# Patient Record
Sex: MALE | Race: WHITE | NOT HISPANIC OR LATINO | Employment: OTHER | URBAN - METROPOLITAN AREA
[De-identification: names, ages, dates, MRNs, and addresses within clinical notes are randomized per-mention and may not be internally consistent; named-entity substitution may affect disease eponyms.]

---

## 2022-08-01 ENCOUNTER — APPOINTMENT (OUTPATIENT)
Dept: SPEECH THERAPY | Facility: CLINIC | Age: 87
DRG: 065 | End: 2022-08-01
Attending: PHYSICIAN ASSISTANT
Payer: COMMERCIAL

## 2022-08-01 ENCOUNTER — APPOINTMENT (OUTPATIENT)
Dept: CT IMAGING | Facility: CLINIC | Age: 87
DRG: 065 | End: 2022-08-01
Attending: EMERGENCY MEDICINE
Payer: COMMERCIAL

## 2022-08-01 ENCOUNTER — APPOINTMENT (OUTPATIENT)
Dept: OCCUPATIONAL THERAPY | Facility: CLINIC | Age: 87
DRG: 065 | End: 2022-08-01
Attending: PHYSICIAN ASSISTANT
Payer: COMMERCIAL

## 2022-08-01 ENCOUNTER — APPOINTMENT (OUTPATIENT)
Dept: MRI IMAGING | Facility: CLINIC | Age: 87
DRG: 065 | End: 2022-08-01
Payer: COMMERCIAL

## 2022-08-01 ENCOUNTER — HOSPITAL ENCOUNTER (INPATIENT)
Facility: CLINIC | Age: 87
LOS: 29 days | Discharge: NURSING, REHAB OR LONG-TERM CARE | DRG: 065 | End: 2022-08-30
Attending: EMERGENCY MEDICINE | Admitting: HOSPITALIST
Payer: COMMERCIAL

## 2022-08-01 DIAGNOSIS — I63.9 CEREBROVASCULAR ACCIDENT (CVA), UNSPECIFIED MECHANISM (H): ICD-10-CM

## 2022-08-01 DIAGNOSIS — E11.9 TYPE 2 DIABETES, HBA1C GOAL < 8% (H): ICD-10-CM

## 2022-08-01 DIAGNOSIS — E10.9 TYPE 1 DIABETES, HBA1C GOAL < 8% (H): Primary | ICD-10-CM

## 2022-08-01 DIAGNOSIS — R47.01 APHASIA: ICD-10-CM

## 2022-08-01 LAB
ALBUMIN UR-MCNC: NEGATIVE MG/DL
ANION GAP SERPL CALCULATED.3IONS-SCNC: 4 MMOL/L (ref 3–14)
APPEARANCE UR: CLEAR
APTT PPP: 34 SECONDS (ref 22–38)
ATRIAL RATE - MUSE: 84 BPM
BASOPHILS # BLD AUTO: 0 10E3/UL (ref 0–0.2)
BASOPHILS NFR BLD AUTO: 0 %
BILIRUB UR QL STRIP: NEGATIVE
BUN SERPL-MCNC: 15 MG/DL (ref 7–30)
CALCIUM SERPL-MCNC: 7.5 MG/DL (ref 8.5–10.1)
CHLORIDE BLD-SCNC: 106 MMOL/L (ref 94–109)
CHOLEST SERPL-MCNC: 100 MG/DL
CO2 SERPL-SCNC: 24 MMOL/L (ref 20–32)
COLOR UR AUTO: ABNORMAL
CREAT SERPL-MCNC: 0.82 MG/DL (ref 0.66–1.25)
DIASTOLIC BLOOD PRESSURE - MUSE: NORMAL MMHG
EOSINOPHIL # BLD AUTO: 0.1 10E3/UL (ref 0–0.7)
EOSINOPHIL NFR BLD AUTO: 3 %
ERYTHROCYTE [DISTWIDTH] IN BLOOD BY AUTOMATED COUNT: 13.3 % (ref 10–15)
GFR SERPL CREATININE-BSD FRML MDRD: 84 ML/MIN/1.73M2
GLUCOSE BLD-MCNC: 293 MG/DL (ref 70–99)
GLUCOSE BLDC GLUCOMTR-MCNC: 184 MG/DL (ref 70–99)
GLUCOSE BLDC GLUCOMTR-MCNC: 200 MG/DL (ref 70–99)
GLUCOSE UR STRIP-MCNC: 500 MG/DL
HBA1C MFR BLD: 7.1 % (ref 0–5.6)
HBA1C MFR BLD: 7.3 % (ref 0–5.6)
HCT VFR BLD AUTO: 32.9 % (ref 40–53)
HDLC SERPL-MCNC: 38 MG/DL
HGB BLD-MCNC: 11 G/DL (ref 13.3–17.7)
HGB UR QL STRIP: NEGATIVE
HOLD SPECIMEN: NORMAL
IMM GRANULOCYTES # BLD: 0 10E3/UL
IMM GRANULOCYTES NFR BLD: 0 %
INR PPP: 1.37 (ref 0.85–1.15)
INTERPRETATION ECG - MUSE: NORMAL
KETONES UR STRIP-MCNC: NEGATIVE MG/DL
LDLC SERPL CALC-MCNC: 40 MG/DL
LEUKOCYTE ESTERASE UR QL STRIP: NEGATIVE
LYMPHOCYTES # BLD AUTO: 2 10E3/UL (ref 0.8–5.3)
LYMPHOCYTES NFR BLD AUTO: 36 %
MCH RBC QN AUTO: 30.7 PG (ref 26.5–33)
MCHC RBC AUTO-ENTMCNC: 33.4 G/DL (ref 31.5–36.5)
MCV RBC AUTO: 92 FL (ref 78–100)
MONOCYTES # BLD AUTO: 0.4 10E3/UL (ref 0–1.3)
MONOCYTES NFR BLD AUTO: 7 %
NEUTROPHILS # BLD AUTO: 3 10E3/UL (ref 1.6–8.3)
NEUTROPHILS NFR BLD AUTO: 54 %
NITRATE UR QL: NEGATIVE
NONHDLC SERPL-MCNC: 62 MG/DL
NRBC # BLD AUTO: 0 10E3/UL
NRBC BLD AUTO-RTO: 0 /100
P AXIS - MUSE: 34 DEGREES
PH UR STRIP: 6.5 [PH] (ref 5–7)
PLATELET # BLD AUTO: 147 10E3/UL (ref 150–450)
POTASSIUM BLD-SCNC: 4.2 MMOL/L (ref 3.4–5.3)
PR INTERVAL - MUSE: 196 MS
QRS DURATION - MUSE: 162 MS
QT - MUSE: 418 MS
QTC - MUSE: 493 MS
R AXIS - MUSE: -71 DEGREES
RBC # BLD AUTO: 3.58 10E6/UL (ref 4.4–5.9)
RBC URINE: 0 /HPF
SARS-COV-2 RNA RESP QL NAA+PROBE: NEGATIVE
SODIUM SERPL-SCNC: 134 MMOL/L (ref 133–144)
SP GR UR STRIP: 1.02 (ref 1–1.03)
SYSTOLIC BLOOD PRESSURE - MUSE: NORMAL MMHG
T AXIS - MUSE: 17 DEGREES
TRIGL SERPL-MCNC: 110 MG/DL
TROPONIN I SERPL HS-MCNC: 68 NG/L
TROPONIN I SERPL HS-MCNC: 78 NG/L
TROPONIN I SERPL HS-MCNC: 90 NG/L
UROBILINOGEN UR STRIP-MCNC: NORMAL MG/DL
VENTRICULAR RATE- MUSE: 84 BPM
WBC # BLD AUTO: 5.6 10E3/UL (ref 4–11)
WBC URINE: 0 /HPF

## 2022-08-01 PROCEDURE — 85730 THROMBOPLASTIN TIME PARTIAL: CPT | Performed by: EMERGENCY MEDICINE

## 2022-08-01 PROCEDURE — 255N000002 HC RX 255 OP 636: Performed by: EMERGENCY MEDICINE

## 2022-08-01 PROCEDURE — 93005 ELECTROCARDIOGRAM TRACING: CPT

## 2022-08-01 PROCEDURE — 85610 PROTHROMBIN TIME: CPT | Performed by: EMERGENCY MEDICINE

## 2022-08-01 PROCEDURE — 99223 1ST HOSP IP/OBS HIGH 75: CPT | Mod: AI | Performed by: PHYSICIAN ASSISTANT

## 2022-08-01 PROCEDURE — C9803 HOPD COVID-19 SPEC COLLECT: HCPCS

## 2022-08-01 PROCEDURE — 250N000012 HC RX MED GY IP 250 OP 636 PS 637: Performed by: PHYSICIAN ASSISTANT

## 2022-08-01 PROCEDURE — U0003 INFECTIOUS AGENT DETECTION BY NUCLEIC ACID (DNA OR RNA); SEVERE ACUTE RESPIRATORY SYNDROME CORONAVIRUS 2 (SARS-COV-2) (CORONAVIRUS DISEASE [COVID-19]), AMPLIFIED PROBE TECHNIQUE, MAKING USE OF HIGH THROUGHPUT TECHNOLOGIES AS DESCRIBED BY CMS-2020-01-R: HCPCS | Performed by: EMERGENCY MEDICINE

## 2022-08-01 PROCEDURE — 99285 EMERGENCY DEPT VISIT HI MDM: CPT | Mod: 25

## 2022-08-01 PROCEDURE — 97535 SELF CARE MNGMENT TRAINING: CPT | Mod: GO | Performed by: OCCUPATIONAL THERAPIST

## 2022-08-01 PROCEDURE — 81001 URINALYSIS AUTO W/SCOPE: CPT | Performed by: EMERGENCY MEDICINE

## 2022-08-01 PROCEDURE — 250N000011 HC RX IP 250 OP 636: Performed by: EMERGENCY MEDICINE

## 2022-08-01 PROCEDURE — 250N000009 HC RX 250: Performed by: EMERGENCY MEDICINE

## 2022-08-01 PROCEDURE — 83036 HEMOGLOBIN GLYCOSYLATED A1C: CPT | Performed by: PHYSICIAN ASSISTANT

## 2022-08-01 PROCEDURE — 70450 CT HEAD/BRAIN W/O DYE: CPT

## 2022-08-01 PROCEDURE — 83718 ASSAY OF LIPOPROTEIN: CPT | Performed by: PHYSICIAN ASSISTANT

## 2022-08-01 PROCEDURE — 0042T CT HEAD PERFUSION W CONTRAST: CPT

## 2022-08-01 PROCEDURE — 84484 ASSAY OF TROPONIN QUANT: CPT | Performed by: PHYSICIAN ASSISTANT

## 2022-08-01 PROCEDURE — 250N000013 HC RX MED GY IP 250 OP 250 PS 637: Performed by: PHYSICIAN ASSISTANT

## 2022-08-01 PROCEDURE — 70496 CT ANGIOGRAPHY HEAD: CPT

## 2022-08-01 PROCEDURE — 36415 COLL VENOUS BLD VENIPUNCTURE: CPT | Performed by: PHYSICIAN ASSISTANT

## 2022-08-01 PROCEDURE — 99291 CRITICAL CARE FIRST HOUR: CPT | Mod: GC | Performed by: STUDENT IN AN ORGANIZED HEALTH CARE EDUCATION/TRAINING PROGRAM

## 2022-08-01 PROCEDURE — A9585 GADOBUTROL INJECTION: HCPCS | Performed by: EMERGENCY MEDICINE

## 2022-08-01 PROCEDURE — 80048 BASIC METABOLIC PNL TOTAL CA: CPT | Performed by: EMERGENCY MEDICINE

## 2022-08-01 PROCEDURE — 92526 ORAL FUNCTION THERAPY: CPT | Mod: GN | Performed by: SPEECH-LANGUAGE PATHOLOGIST

## 2022-08-01 PROCEDURE — 85025 COMPLETE CBC W/AUTO DIFF WBC: CPT | Performed by: EMERGENCY MEDICINE

## 2022-08-01 PROCEDURE — 84484 ASSAY OF TROPONIN QUANT: CPT | Performed by: EMERGENCY MEDICINE

## 2022-08-01 PROCEDURE — 97165 OT EVAL LOW COMPLEX 30 MIN: CPT | Mod: GO | Performed by: OCCUPATIONAL THERAPIST

## 2022-08-01 PROCEDURE — 70498 CT ANGIOGRAPHY NECK: CPT

## 2022-08-01 PROCEDURE — 120N000001 HC R&B MED SURG/OB

## 2022-08-01 PROCEDURE — 97530 THERAPEUTIC ACTIVITIES: CPT | Mod: GO | Performed by: OCCUPATIONAL THERAPIST

## 2022-08-01 PROCEDURE — 36415 COLL VENOUS BLD VENIPUNCTURE: CPT | Performed by: EMERGENCY MEDICINE

## 2022-08-01 PROCEDURE — 70553 MRI BRAIN STEM W/O & W/DYE: CPT

## 2022-08-01 PROCEDURE — 92610 EVALUATE SWALLOWING FUNCTION: CPT | Mod: GN | Performed by: SPEECH-LANGUAGE PATHOLOGIST

## 2022-08-01 RX ORDER — METOPROLOL TARTRATE 1 MG/ML
25 INJECTION, SOLUTION INTRAVENOUS ONCE
Status: DISCONTINUED | OUTPATIENT
Start: 2022-08-01 | End: 2022-08-01

## 2022-08-01 RX ORDER — METFORMIN HCL 500 MG
500 TABLET, EXTENDED RELEASE 24 HR ORAL
COMMUNITY

## 2022-08-01 RX ORDER — TAMSULOSIN HYDROCHLORIDE 0.4 MG/1
0.8 CAPSULE ORAL EVERY EVENING
COMMUNITY

## 2022-08-01 RX ORDER — METOPROLOL TARTRATE 1 MG/ML
2.5 INJECTION, SOLUTION INTRAVENOUS EVERY 6 HOURS PRN
Status: DISCONTINUED | OUTPATIENT
Start: 2022-08-01 | End: 2022-08-30 | Stop reason: HOSPADM

## 2022-08-01 RX ORDER — ATORVASTATIN CALCIUM 40 MG/1
40 TABLET, FILM COATED ORAL EVERY EVENING
Status: DISCONTINUED | OUTPATIENT
Start: 2022-08-01 | End: 2022-08-02

## 2022-08-01 RX ORDER — NICOTINE POLACRILEX 4 MG
15-30 LOZENGE BUCCAL
Status: DISCONTINUED | OUTPATIENT
Start: 2022-08-01 | End: 2022-08-30 | Stop reason: HOSPADM

## 2022-08-01 RX ORDER — TAMSULOSIN HYDROCHLORIDE 0.4 MG/1
0.4 CAPSULE ORAL DAILY
Status: DISCONTINUED | OUTPATIENT
Start: 2022-08-02 | End: 2022-08-30 | Stop reason: HOSPADM

## 2022-08-01 RX ORDER — LISINOPRIL AND HYDROCHLOROTHIAZIDE 20; 25 MG/1; MG/1
1 TABLET ORAL DAILY
COMMUNITY
End: 2022-08-07

## 2022-08-01 RX ORDER — DEXTROSE MONOHYDRATE 25 G/50ML
25-50 INJECTION, SOLUTION INTRAVENOUS
Status: DISCONTINUED | OUTPATIENT
Start: 2022-08-01 | End: 2022-08-30 | Stop reason: HOSPADM

## 2022-08-01 RX ORDER — METOPROLOL SUCCINATE 50 MG/1
25 TABLET, EXTENDED RELEASE ORAL DAILY
COMMUNITY
End: 2024-03-07

## 2022-08-01 RX ORDER — IOPAMIDOL 755 MG/ML
125 INJECTION, SOLUTION INTRAVASCULAR ONCE
Status: COMPLETED | OUTPATIENT
Start: 2022-08-01 | End: 2022-08-01

## 2022-08-01 RX ORDER — LIDOCAINE 40 MG/G
CREAM TOPICAL
Status: DISCONTINUED | OUTPATIENT
Start: 2022-08-01 | End: 2022-08-30 | Stop reason: HOSPADM

## 2022-08-01 RX ORDER — ATORVASTATIN CALCIUM 20 MG/1
10 TABLET, FILM COATED ORAL AT BEDTIME
COMMUNITY
End: 2024-03-07

## 2022-08-01 RX ORDER — TAMSULOSIN HYDROCHLORIDE 0.4 MG/1
0.4 CAPSULE ORAL DAILY
Status: DISCONTINUED | OUTPATIENT
Start: 2022-08-01 | End: 2022-08-01

## 2022-08-01 RX ORDER — LORATADINE 10 MG/1
10 TABLET ORAL DAILY
COMMUNITY
End: 2024-03-07

## 2022-08-01 RX ORDER — GADOBUTROL 604.72 MG/ML
8 INJECTION INTRAVENOUS ONCE
Status: COMPLETED | OUTPATIENT
Start: 2022-08-01 | End: 2022-08-01

## 2022-08-01 RX ADMIN — GADOBUTROL 8 ML: 604.72 INJECTION INTRAVENOUS at 10:12

## 2022-08-01 RX ADMIN — ATORVASTATIN CALCIUM 40 MG: 40 TABLET, FILM COATED ORAL at 21:20

## 2022-08-01 RX ADMIN — INSULIN ASPART 1 UNITS: 100 INJECTION, SOLUTION INTRAVENOUS; SUBCUTANEOUS at 21:36

## 2022-08-01 RX ADMIN — SODIUM CHLORIDE 100 ML: 900 INJECTION INTRAVENOUS at 09:19

## 2022-08-01 RX ADMIN — IOPAMIDOL 125 ML: 755 INJECTION, SOLUTION INTRAVENOUS at 09:19

## 2022-08-01 ASSESSMENT — ACTIVITIES OF DAILY LIVING (ADL)
ADLS_ACUITY_SCORE: 37
ADLS_ACUITY_SCORE: 35
ADLS_ACUITY_SCORE: 37

## 2022-08-01 ASSESSMENT — ENCOUNTER SYMPTOMS
WEAKNESS: 0
NUMBNESS: 0
SPEECH DIFFICULTY: 1
HEADACHES: 0

## 2022-08-01 NOTE — ED PROVIDER NOTES
History   Chief Complaint:  Slurred Speech       The history is provided by the EMS personnel.      Beverley Macias is a 89 year old male with history of atrial fibrillation on eliquis who presents with stroke symptoms. Last night around 1130 p.m. Beverley went to bed feeling normal. This morning around 0430 Beverley woke with speech and gait abnormalities. EMS was called who found beverley with sugars around 301. Here at the emergency department Beverley denies any headache or numbness/tingling.  He is having difficulty with word finding and difficulty producing his words.    Review of Systems   Neurological: Positive for speech difficulty. Negative for weakness, numbness and headaches.   All other systems reviewed and are negative.    Allergies:  No known allergies    Medications:  Tessalon  Fluticasone   Atorvastatin  Loratadine  Metformin   Metoprolol   Tamsulosin HCl  Outpatient Triamcinolone Acetonide  Eliquis    Past Medical History:     Sprain and strain of knee and leg  TIA  HTN  DM2  CAD  Atrial fibrillation  BPH    Past Surgical History:    The patient does not have any pertinent past surgical history.     Social History:  Patient presents via EMS  PCP: No primary care provider on file.     Physical Exam     Patient Vitals for the past 24 hrs:   BP Temp Temp src Pulse Resp SpO2 Weight   08/01/22 1000 (!) 162/83 -- -- 78 15 95 % --   08/01/22 0945 (!) 151/92 97.6  F (36.4  C) Temporal 82 16 97 % --   08/01/22 0944 (!) 188/80 -- -- 82 13 96 % 84 kg (185 lb 3 oz)   08/01/22 0935 (!) 173/85 -- -- 85 11 -- --       Physical Exam  General: Alert, interactive in mild distress  Head:  Scalp is atraumatic  Eyes:  The pupils are equal, round, and reactive to light    EOM's intact    No scleral icterus  ENT:      Nose:  The external nose is normal  Ears:  External ears are normal  Mouth/Throat: Edentulous, dry mucous membranes      Neck:  Normal range of motion.      There is no rigidity.    Trachea is in the  midline         CV:  Regular rate and rhythm      Resp:  Breath sounds are clear bilaterally    Non-labored, no retractions or accessory muscle use      GI:  Abdomen is soft, no distension, no tenderness.       MS:  Normal strength in all 4 extremities  Skin:  Warm and dry, No rash or lesions noted.  Neuro: Strength 5/5 x4.  Sensation intact  In all 4 extremities.      [employer] Stroke Scale  Exam Interval: Baseline   Score    Level of consciousness: (0)   Alert, keenly responsive    LOC questions: (0)   Answers both questions correctly    LOC commands: (0)   Performs both tasks correctly    Best gaze: (0)   Normal    Visual: (0)   No visual loss    Facial palsy: (0)   Normal symmetrical movements    Motor arm (left): (0)   No drift    Motor arm (right): (0)   No drift    Motor leg (left): (0)   No drift    Motor leg (right): (0)   No drift    Limb ataxia: (0)   Absent    Sensory: (0)   Normal- no sensory loss    Best language: (1)   Mild to moderate aphasia    Dysarthria: (1)   Mild to moderate dysarthria    Extinction and inattention: (0)   No abnormality        Total Score:  2     Psych:  Awake. Alert.  Normal affect.      Appropriate interactions.    Emergency Department Course   ECG  ECG taken at 0946, ECG read at 0954  Sinus rhythm with frequent premature ventricular complexes. Right bundle branch block. Left anterior fascicular block. Bifascicular block. Abnormal ECG.    Agree with computer interpretation.  Rate 84 bpm. KS interval 196 ms. QRS duration 162 ms. QT/QTc 418/493 ms. P-R-T axes 34 -71 17.     Imaging:  MR Brain w/o & w Contrast   Final Result   IMPRESSION:   1. Acute right cerebellar infarct.   2. 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   3. Chronic changes as detailed.      MORRO CORREA MD            SYSTEM ID:  [id number]      CT Head Perfusion w Contrast   Final Result   IMPRESSION: Mild transit time prolongation is present corresponding to   the left anterior cerebral artery  distribution with compensated   cerebral blood volumes. Otherwise, no focal perfusion defects are   identified.      MORRO ALEJANDRO MD            SYSTEM ID:  [id number]      CTA Head Neck w Contrast   Preliminary Result   IMPRESSION:    CTA Head:    1. 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    2. No other large vessel occlusions or high-grade stenoses are   identified.   CTA Neck:    No evidence of large vessel occlusion or high-grade stenosis.       CT Head w/o Contrast   Final Result   IMPRESSION:     1. No CT evidence of acute ischemia or hemorrhage (ASPECTS 10).   2. Small old infarct involving the left prefrontal cortex in the   anterior cerebral artery distribution.      Findings were discussed by phone between Dr. Alejandro and Dr. Vasquez   at 9:36 AM on 8/1/2022.      MORRO ALEJANDRO MD            SYSTEM ID:  [id number]        Report per radiology    Laboratory:  Labs Ordered and Resulted from Time of ED Arrival to Time of ED Departure   BASIC METABOLIC PANEL - Abnormal       Result Value    Sodium 134      Potassium 4.2      Chloride 106      Carbon Dioxide (CO2) 24      Anion Gap 4      Urea Nitrogen 15      Creatinine 0.82      Calcium 7.5 (*)     Glucose 293 (*)     GFR Estimate 84     INR - Abnormal    INR 1.37 (*)    CBC WITH PLATELETS AND DIFFERENTIAL - Abnormal    WBC Count 5.6      RBC Count 3.58 (*)     Hemoglobin 11.0 (*)     Hematocrit 32.9 (*)     MCV 92      MCH 30.7      MCHC 33.4      RDW 13.3      Platelet Count 147 (*)     % Neutrophils 54      % Lymphocytes 36      % Monocytes 7      % Eosinophils 3      % Basophils 0      % Immature Granulocytes 0      NRBCs per 100 WBC 0      Absolute Neutrophils 3.0      Absolute Lymphocytes 2.0      Absolute Monocytes 0.4      Absolute Eosinophils 0.1      Absolute Basophils 0.0      Absolute Immature Granulocytes 0.0      Absolute NRBCs 0.0     ROUTINE UA WITH MICROSCOPIC REFLEX TO CULTURE - Abnormal    Color Urine Light Yellow      Appearance  Urine Clear      Glucose Urine 500  (*)     Bilirubin Urine Negative      Ketones Urine Negative      Specific Gravity Urine 1.023      Blood Urine Negative      pH Urine 6.5      Protein Albumin Urine Negative      Urobilinogen Urine Normal      Nitrite Urine Negative      Leukocyte Esterase Urine Negative      RBC Urine 0      WBC Urine 0     PARTIAL THROMBOPLASTIN TIME - Normal    aPTT 34     TROPONIN I - Normal    Troponin I High Sensitivity 68     COVID-19 VIRUS (CORONAVIRUS) BY PCR - Normal    SARS CoV2 PCR Negative     GLUCOSE MONITOR NURSING POCT      Emergency Department Course:       Reviewed:  I reviewed nursing notes, vitals and past medical history    Assessments/Consults:  ED Course as of 08/01/22 1118   Mon Aug 01, 2022   0914 Obtained history and examined the patient as noted above.    0916 Code stroke tier 2 called.   0922 Consult with stroke neurology   0936 Consult with Dr. Alejandro from Radiology.   1108 Consult with Dr. Rouse from Stroke Neurology.   1116 Consulted with the Hospitalist about the patient's history and presentation in the emergency department. Hospitalist agreed to accept the patient for admission.         Disposition:  The patient was admitted to the hospital under the care of Dr. Garcia.     Impression & Plan   Medical Decision Making:  Following presentation history and physical examination were performed, tier 2 code stroke was activated.  He is not a candidate for tenecteplase given his use of Eliquis, he is not a candidate for thrombectomy given the lack of a large vessel occlusion.  Hyperacute MRI demonstrates a small cerebellar infarct which could be causing his balance issues.  The case was discussed on multiple episodes with the stroke neurologist team, they will continue to see the patient in consultation.  They have advised against antiplatelets at present but will consider for inpatient.  Given the ongoing symptoms he will be admitted to the hospital for further  evaluation and treatment.  There is no signs of an acute infectious etiology or more concerning illness.    Critical care time exclusive of procedures is 30 minutes during this time there is bedside evaluation, family consultation, EMS consultation, and neurology and hospitalist consultation.    Diagnosis:    ICD-10-CM    1. Cerebrovascular accident (CVA), unspecified mechanism (H)  I63.9    2. Aphasia  R47.01        Scribe Disclosure:  Bryan BALTAZAR, am serving as a scribe at 9:18 AM on 8/1/2022 to document services personally performed by Mc Vasquez MD based on my observations and the provider's statements to me.           Mc Vasquez MD  08/01/22 1200       Mc Vasquez MD  08/01/22 1200

## 2022-08-01 NOTE — CONSULTS
[facility]    Stroke Consult Note    Reason for Consult: Stroke Code     Chief Complaint: Slurred Speech      HPI  Nathan Macias is a 89 year old male on Eliquis for Afib presents with severe dysarthria and aphasia. He notes he is having difficulty with his speech. He was LNK at 1130 last night and woke up at 430am with aphasia. He reports taking his Eliquis last night.     Imaging Findings                                                                   IMPRESSION:    1. No CT evidence of acute ischemia or hemorrhage (ASPECTS 10).  2. Small old infarct involving the left prefrontal cortex in the  anterior cerebral artery distribution    CTA Head:   1. 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   2. No other large vessel occlusions or high-grade stenoses are  identified.  CTA Neck:   No evidence of large vessel occlusion or high-grade stenosis.    This result has not been signed. Information might be incomplete.     IMPRESSION: Mild transit time prolongation is present corresponding to  the left anterior cerebral artery distribution with compensated  cerebral blood volumes. Otherwise, no focal perfusion defects are  identified.      MRI brain WWO: Showed DWI changes in the R cerebellum no acute stroke in the left OMKAR territory.   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     Impression   Acute ischemic stroke of R cerebllum due to cardioembolism vs small vessel disease- had expressive aphasia, waxes and wanes on presentation here is currently on Eliquis for Afib. Given anticoagulation, would consider  "other possible etiologies such as seizures for presentation. MRI showed  Acute stroke in the R cerebellum that is consistent with dysarthria, right arm and leg ataxia seen on exam, however inconsistent with aphasia. 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 Given Eliquis use may be an eliquis failure, has a smoking history so consideration for malignancy as a possible etiology.     Recommendations  - Use orderset: \"Ischemic Stroke Routine Admission\" or \"Ischemic Stroke No Thrombolytics/No Thrombectomy ICU Admission\"  - Neurochecks and Vital Signs every Q4H   - Permissive HTN; goal SBP < 220 mmHg  - Statin: Lipitor 40  - TTE (with Bubble Study if age 60 yrs or less)  - Telemetry, EKG  - Bedside Glucose Monitoring  - A1c, Lipid Panel, Troponin x 3  - PT/OT/SLP  - Stroke Education  - Euthermia, Euglycemia  -Resume Eliquis   -Has a smoking history would consider malignancy work-up given risk factor and stroke through Eliquis.     Patient Follow-up    - final recommendation pending work-up        Thank you for this consult. We will continue to follow.     The Stroke Staff is Dr. Guillory.    Janeth Walton MD  Vascular Neurology Fellow  To page me or covering stroke neurology team member, click here: [facility]   Choose \"On Call\" tab at top, then search dropdown box for \"Neurology Adult\", select location, press Enter, then look for stroke/neuro ICU/telestroke.    ______________________________________________________    Clinically Significant Risk Factors Present on Admission     Past Medical History   Past Medical History:   Diagnosis Date     A-fib (H)      BPH (benign prostatic hyperplasia)      CAD (coronary artery disease)      DMII (diabetes mellitus, type 2) (H)      TIA (transient ischemic attack)      Past Surgical History   Past Surgical History:   Procedure Laterality Date     BACK SURGERY      1990s     TONGUE SURGERY      due to precancerous lesion "     Medications   Home Meds  Prior to Admission medications    Medication Sig Start Date End Date Taking? Authorizing Provider   ACTOS 45 MG OR TABS 1 TABLET DAILY    Reported, Patient   GLIPIZIDE OR one tablet daily    Reported, Patient   LEVAQUIN 500 MG OR TABS ONE DAILY 08   Jayleen Lorenz PA-C   RANITIDINE  MG OR TABS 1 TABLET once  DAILY    Reported, Patient   ROCEPHIN 1 GM IJ SOLR 1 gram IM in clinic 08   Jayleen Lorenz PA-C       Scheduled Meds      Infusion Meds      PRN Meds      Allergies   No Known Allergies  Family History   No family history on file.  Social History   Social History     Tobacco Use     Smoking status: Former Smoker     Years: 25.00     Types: Cigars, Pipe     Quit date:      Years since quittin.   Substance Use Topics     Alcohol use: Never       Review of Systems   The 5 point Review of Systems is negative other than noted in the HPI or here.        PHYSICAL EXAMINATION  Temp:  [97.4  F (36.3  C)-97.6  F (36.4  C)] 97.4  F (36.3  C)  Pulse:  [44-85] 68  Resp:  [11-25] 16  BP: (141-188)/() 173/87  SpO2:  [95 %-98 %] 97 %     Neurologic  Mental Status:  alert, oriented x 3, follows commands, naming and repetition normal, severe dysarthria, mild expressive aphasia  Cranial Nerves:  visual fields intact, EOMI with normal smooth pursuit, facial sensation intact and symmetric, facial movements symmetric, hearing not formally tested but intact to conversation, dysarthria  Motor:  normal muscle tone and bulk, no abnormal movements, able to move all limbs spontaneously, strength 5/5 throughout upper and lower extremities, no pronator drift  Reflexes:  toes down-going  Sensory:  light touch sensation intact and symmetric throughout upper and lower extremities, no extinction on double simultaneous stimulation   Coordination:  ataxia in RUE, RLE with heel to shin and finger to nose  Station/Gait:  deferred    Dysphagia Screen  Per  Nursing    Stroke Scales    NIHSS  1a. Level of Consciousness 0-->Alert, keenly responsive   1b. LOC Questions 0-->Answers both questions correctly   1c. LOC Commands 0-->Performs both tasks correctly   2.   Best Gaze 0-->Normal   3.   Visual 0-->No visual loss   4.   Facial Palsy 0-->Normal symmetrical movements   5a. Motor Arm, Left 0-->No drift, limb holds 90 (or 45) degrees for full 10 secs   5b. Motor Arm, Right 0-->No drift, limb holds 90 (or 45) degrees for full 10 secs   6a. Motor Leg, Left 0-->No drift, leg holds 30 degree position for full 5 secs   6b. Motor Leg, right 0-->No drift, leg holds 30 degree position for full 5 secs   7.   Limb Ataxia 2-->Present in two limbs   8.   Sensory 0-->Normal, no sensory loss   9.   Best Language 1-->Mild-to-moderate aphasia, some obvious loss of fluency or facility of comprehension, without significant limitation on ideas expressed or form of expression. Reduction of speech and/or comprehension, however, makes conversation. . . (see row details)   10. Dysarthria 2-->Severe dysarthria, patients speech is so slurred as to be unintelligible in the absence of or out of proportion to any dysphasia, or is mute/anarthric   11. Extinction and Inattention  0-->No abnormality   Total 5 (08/01/22 0937)       Modified Holcomb Score (Pre-morbid)  1 - No significant disability.  Able to carry out all usual activities, despite some symptoms.    Imaging  I personally reviewed all imaging; relevant findings per HPI.     Lab Results Data   CBC  Recent Labs   Lab 08/01/22  0939   WBC 5.6   RBC 3.58*   HGB 11.0*   HCT 32.9*   *     Basic Metabolic Panel    Recent Labs   Lab 08/01/22  1308 08/01/22  0939   NA  --  134   POTASSIUM  --  4.2   CHLORIDE  --  106   CO2  --  24   BUN  --  15   CR  --  0.82   * 293*   MAURICIO  --  7.5*     Liver Panel  No results for input(s): PROTTOTAL, ALBUMIN, BILITOTAL, ALKPHOS, AST, ALT, BILIDIRECT in the last 168 hours.  INR    Recent Labs   Lab  Test 08/01/22  0939   INR 1.37*      Lipid Profile  No lab results found.  A1C  No lab results found.  Troponin I    Recent Labs   Lab 08/01/22  0939   TROPONINIS 68          Stroke Code Data Data   Stroke Code Data  (for stroke code without tele)  Stroke code activated 08/01/22   0919   First stroke provider response 08/01/22   0937   Last known normal 07/31/22   2330   Time of discovery   (or onset of symptoms) 08/01/22   0430   Head CT read by Stroke Neuro Dr/Provider 08/01/22   0930   Was stroke code de-escalated? Yes 08/01/22 1106

## 2022-08-01 NOTE — PROGRESS NOTES
08/01/22 1454   General Information   Onset of Illness/Injury or Date of Surgery 08/01/22   Referring Physician Dr. Garcia   Patient/Family Therapy Goal Statement (SLP) Patient wants to eat.   Pertinent History of Current Problem Acute ischemic stroke of R cerebllum due to cardioembolism vs small vessel disease- had expressive aphasia, waxes and wanes on presentation here is currently on Eliquis for Afib. Given anticoagulation, would consider other possible etiologies such as seizures for presentation. MRI showed  Acute stroke in the R cerebellum that is consistent with dysarthria, right arm and leg ataxia seen on exam, however inconsistent with aphasia. 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   General Observations Patient reports having a cold with cough.   Past History of Dysphagia No documented history found.   Type of Evaluation   Type of Evaluation Swallow Evaluation   Oral Motor   Oral Musculature generally intact   Structural Abnormalities none present   Mucosal Quality adequate   Dentition (Oral Motor)   Comment, Dentition (Oral Motor) Dentures not present at time of the evaluation.   Dentition (Oral Motor) edentulous;dental appliance/dentures   Dental Appliance/Denture (Oral Motor) upper and lower   Facial Symmetry (Oral Motor)   Facial Symmetry (Oral Motor) WNL   Lip Function (Oral Motor)   Lip Range of Motion (Oral Motor) WNL   Tongue Function (Oral Motor)   Tongue Coordination/Speed (Oral Motor) slows down progressively   Tongue ROM (Oral Motor) elevation is impaired;lateralization is impaired   Lateralization, Tongue ROM Impairment (Oral Motor) bilateral  (Mild incoordination.)   Elevation, Tongue ROM Impairment (Oral Motor) bilateral;minimal impairment   Jaw Function (Oral Motor)   Jaw Function (Oral Motor) WNL   Cough/Swallow/Gag Reflex (Oral Motor)   Soft Palate/Velum (Oral Motor) elevation decreased bilaterally   Volitional Throat  Clear/Cough (Oral Motor) impaired;reduced strength   Volitional Swallow (Oral Motor) mildly delayed   Vocal Quality/Secretion Management (Oral Motor)   Vocal Quality (Oral Motor) WFL   General Swallowing Observations   Respiratory Support (General Swallowing Observations) none   Current Diet/Method of Nutritional Intake (General Swallowing Observations, NIS) NPO   Swallowing Evaluation Clinical swallow evaluation   Clinical Swallow Evaluation   Feeding Assistance set up only required   Clinical Swallow Evaluation Textures Trialed thin liquids;pureed   Clinical Swallow Eval: Thin Liquid Texture Trial   Mode of Presentation, Thin Liquids cup;self-fed   Volume of Liquid or Food Presented 6 oz of water   Oral Phase of Swallow premature pharyngeal entry   Pharyngeal Phase of Swallow reduction in laryngeal movement   Diagnostic Statement No immediate or delaayed Sx of aspiration across 6 oz but can not rule out sieltn aspiration.   Clinical Swallow Evaluation: Puree Solid Texture Trial   Mode of Presentation, Puree spoon;self-fed   Volume of Puree Presented 4 oz of apple sauce   Oral Phase, Puree residue in oral cavity   Oral Residue, Puree left anterior lateral sulci   Pharyngeal Phase, Puree impaired;repeated swallows   Diagnostic Statement Oral residue on the left side that was cleared with a liquid rinse.   Swallowing Recommendations   Diet Consistency Recommendations pureed (level 4);thin liquids (level 0)   Supervision Level for Intake close supervision needed   Mode of Delivery Recommendations bolus size, small;food moistened;no straws;slow rate of intake   Postural Recommendations none   Swallowing Maneuver Recommendations alternate food and liquid intake   Monitoring/Assistance Required (Eating/Swallowing) check mouth frequently for oral residue/pocketing;stop eating activities when fatigue is present;monitor for cough or change in vocal quality with intake   Recommended Feeding/Eating Techniques (Swallow Eval)  maintain upright sitting position for eating;maintain upright posture during/after eating for 30 minutes;set-up and prepare tray   Medication Administration Recommendations, Swallowing (SLP) Crush and place in puree   Instrumental Assessment Recommendations VFSS (videofluoroscopic swallowing study)   Comment, Swallowing Recommendations pending tolerance VFSS maybe indicated.   General Therapy Interventions   Planned Therapy Interventions Dysphagia Treatment   Dysphagia treatment Modified diet education;Instruction of safe swallow strategies   Clinical Impression   Criteria for Skilled Therapeutic Interventions Met (SLP Eval) Yes, treatment indicated   SLP Diagnosis Mild to moderate oral and pharyngeal dysphagia   Risks & Benefits of therapy have been explained evaluation/treatment results reviewed;care plan/treatment goals reviewed;risks/benefits reviewed;current/potential barriers reviewed;participants voiced agreement with care plan;participants included;patient   Clinical Impression Comments Patient presents with mild to moderate oral and pharyngeal dysphagia at bedside. deficits inlcude; premature entry of thin liquids without immediate or delayed Sx of aspiration, but can not rule out silent aspiration. He demonstrated mildly decreased bolus control during AP movement with loss of the bolus into the left lateral sulci. Oral residue was cleared with an additional swallow or liquid rinse.     Recommend: 1. IDDSI level 4 puree and thin liquids. 2. Upright, no straws, small bites/sips, check for oral residue and alternate liquids/solids. Crush medications d/t rported difficulty swallowing pills. If coughing increases with Po intake hold diet.     SLP Discharge Planning   SLP Discharge Recommendation Acute Rehab Center-Motivated patient will benefit from intensive, interdisciplinary therapy.  Anticipate will be able to tolerate 3 hours of therapy per day;Transitional Care Facility   SLP Rationale for DC Rec Pending  further work up. Swallow and communication are below his baseline.   SLP Brief overview of current status  Mild to moderate dysphagia.    Total Evaluation Time   Total Evaluation Time (Minutes) 15

## 2022-08-01 NOTE — ED TRIAGE NOTES
Pt woke up at 0430 today, states was having difficulty sleeping last night. Pt doesn't normally wake at that time. Pt states his speech was slurred and walking was difficult. Pts wife woke up around 0730. Pt is on eliquis and DM. Hx of A-fib

## 2022-08-01 NOTE — H&P
[facility]    History and Physical  Hospitalist       Date of Admission:  8/1/2022    Assessment & Plan   Nathan Macias is a 89 year old male with a history of afib on Eliquis who was brought to the ED with dysarthria, aphasia and ataxia and noted to have an acute ischemic stroke of the RIGHT cerebellum. He is admitted for further evaluation and management.     1. Acute ischemic stroke of RIGHT cerebellum  - due to cardioembolism vs small vessel disease vs other  - expressive aphasia  - stroke team saw in ED  - neurology consult  - on Eliquis at home - could resume Eliquis or start another anticoagulant per neurology recs  - neurochecks q4H   - Lipitor 40mg/day per stroke team recs (was on 20mg at home)  - TTE  - lipid panel  - troponin 68 in ED - will repeat for total x3      2. Atrial fibrillation  - on metoprolol, and Eliquis at home  - initial ECG in ED noted sinus rhythm with frequent PVCs, RBBB, L anterior fascicular block, bifascicular block; HR 84 bpm  - HR significantly lower during exam - will repeat ECG  - continuous telemetry  - will hold metoprolol scheduled with lower HR  - metoprolol 2.5mg IV q6hrs PRN    3.  DMII  - hold metformin  - sliding scale insulin  - A1c pending    4. Hypertension  - hold home anti-hypertensives  -permissive hypertension per stroke team; goal SBP <220mmHg    Summary:  Mr. Macias will be admitted to our neuro floor for further evaluation and management of his acute ischemic stroke, including TTE, close neuro and cardiac monitoring, neurology consult and management of his anticoagulation.     DVT Prophylaxis: Pneumatic Compression Devices  Code Status: Full Code    Disposition: Expected discharge as clinical course indicates.     Aye Ridley PA-C,  Pager: [phone]    Primary Care Physician   [facility]    Chief Complaint   Difficulty with balance and speaking    History is obtained from the patient and patient's  spouse    History of Present Illness   Nathan Macias is a 89 year old male on Eliquis for afib who presented to the ED after initial noting balance issues and then difficulty with his speak. At about 4:30 am this morning, Mr. Macias got up to use the bathroom and was having difficulty walking, and needed to hold onto his wife to steady him to get back to bed. Starting at that time, wife states he was getting up to urinate about every 10 minutes, which is increased for him, and she was steadying him to the bathroom. She also noted that he was having difficulty with his speech/was hard to understand. They eventually presented to the ED. Stroke protocol was followed and Mr. Macias was noted to have an acute ischemic event. Thrombolytics were not given due to time from onset of symptoms and patient had taken Eliquis within the past 24 hours.     When bruises noted on patient's back, endorsed fall 2-3 weeks ago and about three falls total in the past three months. Wife states feels 's balance has not been as good the last three months. Denies ever hitting head with falls. He did not present for evaluation after any of these falls.     Past Medical History    I have reviewed this patient's medical history and updated it with pertinent information if needed.     Past Surgical History   I have reviewed this patient's surgical history and updated it with pertinent information if needed.    Prior to Admission Medications   Prior to Admission Medications   Prescriptions Last Dose Informant Patient Reported? Taking?   apixaban ANTICOAGULANT (ELIQUIS) 5 MG tablet 7/31/2022 at am  Yes Yes   Sig: Take 5 mg by mouth 2 times daily   atorvastatin (LIPITOR) 20 MG tablet 7/31/2022 at am  Yes Yes   Sig: Take 10 mg by mouth daily   lisinopril-hydrochlorothiazide (ZESTORETIC) 20-25 MG tablet 7/31/2022 at am  Yes Yes   Sig: Take 1 tablet by mouth daily   loratadine (CLARITIN) 10 MG tablet 7/31/2022 at am  Yes Yes   Sig:  Take 10 mg by mouth daily   metFORMIN (GLUCOPHAGE XR) 500 MG 24 hr tablet 7/31/2022 at am  Yes Yes   Sig: Take 500 mg by mouth daily (with breakfast)   metoprolol succinate ER (TOPROL XL) 50 MG 24 hr tablet 7/31/2022 at am  Yes Yes   Sig: Take 25 mg by mouth daily   tamsulosin (FLOMAX) 0.4 MG capsule 7/31/2022 at am  Yes Yes   Sig: Take 0.4 mg by mouth daily      Facility-Administered Medications: None     Allergies   No Known Allergies    Social History   I have reviewed this patient's social history and updated it with pertinent information if needed. Wife - Cherry. Son - Dayne.    Family History   Family history reviewed with patient and is noncontributory.    Review of Systems   The 10 point Review of Systems is negative other than noted in the HPI or here.     Physical Exam   Temp: 97.6  F (36.4  C) Temp src: Temporal BP: (!) 162/83 Pulse: 78   Resp: 15 SpO2: 95 %      Vital Signs with Ranges  Temp:  [97.6  F (36.4  C)] 97.6  F (36.4  C)  Pulse:  [78-85] 78  Resp:  [11-16] 15  BP: (151-188)/(80-92) 162/83  SpO2:  [95 %-97 %] 95 %  185 lbs 2.98 oz    Constitutional: A&O x4;, alert, cooperative, no apparent distress.  Eyes: Conjunctiva and pupils examined and normal.  HEENT: Moist mucous membranes, edentulous (patient states typically wears dentures); no irritation or lesions noted  Respiratory: Clear to auscultation bilaterally, no crackles or wheezing.  Cardiovascular: Regular rate and rhythm, normal S1 and S2, and no murmur noted.  GI: Soft, non-distended, non-tender, normal bowel sounds.  Lymph/Hematologic: No anterior cervical or supraclavicular adenopathy.  Skin: No rashes, no cyanosis, no edema.  Musculoskeletal: No joint swelling, erythema or tenderness.  Neurologic: dyarthria and expressive aphasia, Cranial nerves II-XII grossly intact, grossly normal strength and sensation equal bilateral upper and lower extremities.  Skin:Two linear mature bruises on upper back and right upper back - per patient from  fall a few weeks ago.   Psychiatric: Alert, oriented to person, place, time and situation, no obvious anxiety or depression.    Data   Data reviewed today:  I personally reviewed the EKG tracing showing  sinus rhythm with frequent PVCs, RBBB, L anterior fascicular block, bifascicular block; HR 84 bpm and the brain MRI image(s) showing acute right cerebellar infarct with a small old left prefrontal infarct.  Recent Labs   Lab 08/01/22  0939   WBC 5.6   HGB 11.0*   MCV 92   *   INR 1.37*      POTASSIUM 4.2   CHLORIDE 106   CO2 24   BUN 15   CR 0.82   ANIONGAP 4   MAURICIO 7.5*   *       Imaging:  Recent Results (from the past 24 hour(s))   CT Head w/o Contrast    Narrative    CT SCAN OF THE HEAD WITHOUT CONTRAST   8/1/2022 9:23 AM     HISTORY: Code stroke., Aphasia    TECHNIQUE:  Axial images of the head and coronal reformations without  IV contrast material. Radiation dose for this scan was reduced using  automated exposure control, adjustment of the mA and/or kV according  to patient size, or iterative reconstruction technique.    COMPARISON: Head CT 3/22/2004    FINDINGS:  Mild volume loss is present. White matter hypoattenuation  likely represents mild to moderate chronic small vessel ischemic  change. Parenchyma is otherwise unremarkable. Small old infarct  involving the left prefrontal cortex in the anterior cerebral artery  distribution, questionably present on the prior exam. No evidence of  acute ischemia, hemorrhage, mass, mass effect or hydrocephalus. The  visualized calvarium, tympanic cavities, mastoid cavities, and  extracranial soft tissues are unremarkable. Left maxillary sinus  retention cyst. Bilateral lens replacements.      Impression    IMPRESSION:    1. No CT evidence of acute ischemia or hemorrhage (ASPECTS 10).  2. Small old infarct involving the left prefrontal cortex in the  anterior cerebral artery distribution.    Findings were discussed by phone between Dr. Alejandro and   Trigger  at 9:36 AM on 8/1/2022.    MORRO CORREA MD         SYSTEM ID:  [id number]   CTA Head Neck w Contrast    Narrative    CT ANGIOGRAM OF THE HEAD AND NECK WITH CONTRAST  8/1/2022 9:32 AM     HISTORY: Code stroke, aphasia.    TECHNIQUE:  CT angiography with an injection of 75mL Isovue-370 IV  with scans through the head and neck. Images were transferred to a  separate 3-D workstation where multiplanar reformations and 3-D images  were created. Estimates of carotid stenoses are made relative to the  distal internal carotid artery diameters except as noted. Radiation  dose for this scan was reduced using automated exposure control,  adjustment of the mA and/or kV according to patient size, or iterative  reconstruction technique.    COMPARISON: None.     CT ANGIOGRAM HEAD FINDINGS:    The left anterior cerebral artery is occluded at the origin. No other  large vessel occlusions or high-grade stenoses are identified The  internal carotid arteries, right anterior cerebral artery, middle  cerebral arteries, vertebral arteries, basilar artery, and posterior  cerebral arteries are patent. Fetal origin of the right posterior  cerebral artery. Scattered atherosclerotic plaquing with multiple mild  stenoses. No aneurysm or vascular malformation is identified. Dural  venous sinuses are unremarkable.     CT ANGIOGRAM NECK FINDINGS:   The bilateral common carotid, internal carotid, external carotid, and  vertebral arteries are patent. No evidence of large vessel occlusion  or high-grade stenosis. Scattered atherosclerotic plaquing. No  evidence of dissection.     Cervical spine degenerative changes with partially calcified pannus  formation and erosion of the dens. Left maxillary sinus retention  cyst.       Impression    IMPRESSION:   CTA Head:   1. 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   2. No other large vessel occlusions or high-grade stenoses are  identified.  CTA Neck:   No evidence of large vessel occlusion  or high-grade stenosis.    CT Head Perfusion w Contrast    Narrative    CT BRAIN PERFUSION  8/1/2022 9:33 AM    HISTORY: Code stroke.    TECHNIQUE: Time sequential axial CT images of the head were acquired  during the administration of 125 mL Isovue-370 total IV. Color  perfusion maps of the brain were created from this time sequential  axial source data.     Radiation dose for this scan was reduced using automated exposure  control, adjustment of the mA and/or kV according to patient size, or  iterative reconstruction technique.    COMPARISON: None.      Impression    IMPRESSION: Mild transit time prolongation is present corresponding to  the left anterior cerebral artery distribution with compensated  cerebral blood volumes. Otherwise, no focal perfusion defects are  identified.    MORRO CORREA MD         SYSTEM ID:  [id number]   MR Brain w/o & w Contrast    Narrative    MRI BRAIN WITHOUT AND WITH CONTRAST  8/1/2022 10:36 AM    HISTORY:  Hyperacute rule out stroke.     TECHNIQUE:  Multiplanar, multisequence MRI of the brain without and  with 8 mL Gadavist.    COMPARISON: Same day head CT    FINDINGS:  Small area of diffusion restriction within the right  cerebellar hemisphere. No other areas of diffusion restriction. No  convincing evidence of hemorrhage. Mild parenchymal volume loss is  present with white matter T2 hyperintensities which likely represent  chronic small vessel ischemic change. Probable old basal  ganglia/periventricular lacunar infarcts. Small old infarct involving  the left prefrontal cortex.    Marrow signal is within normal limits. Left maxillary sinus retention  cyst. Trace right mastoid cavity opacification. Bilateral lens  replacements.      Impression    IMPRESSION:  1. Acute right cerebellar infarct.  2. Small old left prefrontal infarct.  3. Chronic changes as detailed.

## 2022-08-01 NOTE — CARE PLAN
RECEIVING UNIT ED HANDOFF REVIEW    ED Nurse Handoff Report was reviewed by: David Fisher RN on August 1, 2022 at 1:49 PM

## 2022-08-01 NOTE — CARE PLAN
Shift Summary    Neuro: A&O forgetful. WFD and slurred speech.   Cardiac: Afib CVR with PVCs  Pulmonary: Congest cough  GI/: Purewick. Pureed diet. SLP recommending crushing meds.  Skin: scattered bruising  Activity: A2  Pain: denies

## 2022-08-01 NOTE — PHARMACY-ADMISSION MEDICATION HISTORY
Pharmacy Medication History  Admission medication history interview status for the 8/1/2022  admission is complete. See EPIC admission navigator for prior to admission medications     Location of Interview: Patient room  Medication history sources: Patient and Patient's family/friend (spouse) and bag of medications wife brought in.    Significant changes made to the medication list:  Added all meds to list[facility]    In the past week, patient estimated taking medication this percent of the time: greater than 90%    Additional medication history information:   Missed doses last PM    Medication reconciliation completed by provider prior to medication history? No    Time spent in this activity: 10 minutes    Prior to Admission medications    Medication Sig Last Dose Taking? Auth Provider Long Term End Date   apixaban ANTICOAGULANT (ELIQUIS) 5 MG tablet Take 5 mg by mouth 2 times daily 7/31/2022 at am Yes Unknown, Entered By History     atorvastatin (LIPITOR) 20 MG tablet Take 10 mg by mouth daily 7/31/2022 at am Yes Unknown, Entered By History Yes    lisinopril-hydrochlorothiazide (ZESTORETIC) 20-25 MG tablet Take 1 tablet by mouth daily 7/31/2022 at am Yes Unknown, Entered By History Yes    loratadine (CLARITIN) 10 MG tablet Take 10 mg by mouth daily 7/31/2022 at am Yes Unknown, Entered By History     metFORMIN (GLUCOPHAGE XR) 500 MG 24 hr tablet Take 500 mg by mouth daily (with breakfast) 7/31/2022 at am Yes Unknown, Entered By History Yes    metoprolol succinate ER (TOPROL XL) 50 MG 24 hr tablet Take 25 mg by mouth daily 7/31/2022 at am Yes Unknown, Entered By History Yes    tamsulosin (FLOMAX) 0.4 MG capsule Take 0.4 mg by mouth daily 7/31/2022 at am Yes Unknown, Entered By History         The information provided in this note is only as accurate as the sources available at the time of update(s)     Korin Gupta, PharmD  Inpatient Clinical Pharmacist  [phone]

## 2022-08-01 NOTE — PROGRESS NOTES
08/01/22 1518   Quick Adds   Type of Visit Initial Occupational Therapy Evaluation   Living Environment   People in Home spouse   Current Living Arrangements apartment   Transportation Anticipated family or friend will provide   Living Environment Comments Apartment with no stairs. Walk in shower with grab bars, standard toilet with grab bars.   Self-Care   Usual Activity Tolerance good   Current Activity Tolerance moderate   Regular Exercise No   Equipment Currently Used at Home none   Fall history within last six months yes   Number of times patient has fallen within last six months 3   Activity/Exercise/Self-Care Comment Independent with self cares and functional mobilitiy at baseline   Instrumental Activities of Daily Living (IADL)   Previous Responsibilities meal prep;laundry;housekeeping;driving;medication management   General Information   Onset of Illness/Injury or Date of Surgery 08/01/22   Referring Physician Aye Ridley PA-C   Patient/Family Therapy Goal Statement (OT) Return home   Additional Occupational Profile Info/Pertinent History of Current Problem Nathan Macias is a 89 year old male with a history of afib on Eliis who was brought to the ED with dysarthria, aphasia and ataxia and noted to have an acute ischemic stroke of the RIGHT cerebellum. He is admitted for further evaluation and management.   Existing Precautions/Restrictions fall   Limitations/Impairments aphasia   General Observations and Info Agreeable to OT eval   Cognitive Status Examination   Orientation Status orientation to person, place and time   Follows Commands follows one-step commands;75-90% accuracy   Cognitive Status Comments Aphasia noted as language sounding slighty jarbled with occassional word finding difficulty. Pt following commands fairly well throughout sessions and interacting with items/supplies appropriatly.   Visual Perception   Visual Impairment/Limitations other (see comments)   Impact of  Vision Impairment on Function (Vision) Pt appearing to have minor visual defecits in R field. Pt having greater difficulty in R peripheral vision than L. Difficult to say for sure as pt stuggled to remain forward gaze and only look through periphery   Sensory   Sensory Quick Adds No deficits were identified   Posture   Posture forward head position   Range of Motion Comprehensive   General Range of Motion no range of motion deficits identified   Strength Comprehensive (MMT)   General Manual Muscle Testing (MMT) Assessment no strength deficits identified   Comment, General Manual Muscle Testing (MMT) Assessment 5/5 B UE   Coordination   Upper Extremity Coordination Left UE impaired;Right UE impaired   Functional Limitations Fine motor ADL performance impaired;Impaired ability to perform bilateral tasks;Object transport impaired;Reach to targets impaired   Coordination Comments Finger to nose slower and more difficult with R UE than L.   Bed Mobility   Bed Mobility supine-sit;sit-supine   Supine-Sit [place] (Bed Mobility) supervision   Sit-Supine [place] (Bed Mobility) supervision   Transfers   Transfers sit-stand transfer   Sit-Stand Transfer   Sit-Stand [place] (Transfers) minimum assist (75% patient effort)   Assistive Device (Sit-Stand Transfers) walker, front-wheeled   Sit/Stand Transfer Comments due to decreased balance   Balance   Balance Comments noted decreased balance, defer to PT eval   Activities of Daily Living   BADL Assessment/Intervention lower body dressing;feeding   Lower Body Dressing Assessment/Training   [place] Level (Lower Body Dressing) moderate assist (50% patient effort)   Eating/Self Feeding   [place] Level (Feeding) set up   Clinical Impression   Criteria for Skilled Therapeutic Interventions Met (OT) Yes, treatment indicated   OT Diagnosis Decreased independence with self-cares and functional mobility   OT Problem List-Impairments impacting ADL problems related  to;activity tolerance impaired;cognition;communication;coordination;balance   Assessment of Occupational Performance 3-5 Performance Deficits   Identified Performance Deficits Limitations with dressing, bathing, toileting, home mgmt   Planned Therapy Interventions (OT) ADL retraining;IADL retraining;neuromuscular re-education;progressive activity/exercise;strengthening;cognition   Clinical Decision Making Complexity (OT) low complexity   Risk & Benefits of therapy have been explained evaluation/treatment results reviewed;care plan/treatment goals reviewed;risks/benefits reviewed;current/potential barriers reviewed;participants voiced agreement with care plan;participants included;patient   OT Discharge Planning   OT Discharge Recommendation (DC Rec) [facility] patient will benefit from intensive, interdisciplinary therapy.  Anticipate will be able to tolerate 3 hours of therapy per day   OT Rationale for DC Rec Pt presenting below baseline in self-cares and functional mobility. Pt sould benefit from intensive OT, PT, and SLP at [facility] to progress functional independence prior to return home.   Total Evaluation Time (Minutes)   Total Evaluation Time (Minutes) 15   OT Goals   Therapy Frequency (OT) Daily   OT Predicted Duration/Target Date for Goal Attainment 08/08/22   OT Goals Lower Body Dressing;Hygiene/Grooming;Transfers;Toilet Transfer/Toileting   OT: Hygiene/Grooming supervision/stand-by assist;while standing   OT: Lower Body Dressing Modified independent;using adaptive equipment   OT: Transfer Supervision/stand-by assist;with assistive device   OT: Toilet Transfer/Toileting Supervision/stand-by assist;using adaptive equipment

## 2022-08-01 NOTE — ED NOTES
Bed: ST03  Expected date:   Expected time:   Means of arrival:   [place]  [facility] [place] [phone] [place] 89 [place] stroke alert eta [id number]

## 2022-08-02 ENCOUNTER — APPOINTMENT (OUTPATIENT)
Dept: OCCUPATIONAL THERAPY | Facility: CLINIC | Age: 87
DRG: 065 | End: 2022-08-02
Payer: COMMERCIAL

## 2022-08-02 ENCOUNTER — APPOINTMENT (OUTPATIENT)
Dept: CT IMAGING | Facility: CLINIC | Age: 87
DRG: 065 | End: 2022-08-02
Payer: COMMERCIAL

## 2022-08-02 ENCOUNTER — APPOINTMENT (OUTPATIENT)
Dept: PHYSICAL THERAPY | Facility: CLINIC | Age: 87
DRG: 065 | End: 2022-08-02
Payer: COMMERCIAL

## 2022-08-02 ENCOUNTER — APPOINTMENT (OUTPATIENT)
Dept: CARDIOLOGY | Facility: CLINIC | Age: 87
DRG: 065 | End: 2022-08-02
Attending: PHYSICIAN ASSISTANT
Payer: COMMERCIAL

## 2022-08-02 ENCOUNTER — APPOINTMENT (OUTPATIENT)
Dept: PHYSICAL THERAPY | Facility: CLINIC | Age: 87
DRG: 065 | End: 2022-08-02
Attending: PHYSICIAN ASSISTANT
Payer: COMMERCIAL

## 2022-08-02 LAB
ANION GAP SERPL CALCULATED.3IONS-SCNC: 5 MMOL/L (ref 3–14)
BASOPHILS # BLD AUTO: 0 10E3/UL (ref 0–0.2)
BASOPHILS NFR BLD AUTO: 0 %
BUN SERPL-MCNC: 17 MG/DL (ref 7–30)
CALCIUM SERPL-MCNC: 8.4 MG/DL (ref 8.5–10.1)
CHLORIDE BLD-SCNC: 108 MMOL/L (ref 94–109)
CHOLEST SERPL-MCNC: 94 MG/DL
CO2 SERPL-SCNC: 25 MMOL/L (ref 20–32)
CREAT SERPL-MCNC: 0.79 MG/DL (ref 0.66–1.25)
DEPRECATED S PYO AG THROAT QL EIA: NEGATIVE
EOSINOPHIL # BLD AUTO: 0.1 10E3/UL (ref 0–0.7)
EOSINOPHIL NFR BLD AUTO: 1 %
ERYTHROCYTE [DISTWIDTH] IN BLOOD BY AUTOMATED COUNT: 13.2 % (ref 10–15)
ERYTHROCYTE [DISTWIDTH] IN BLOOD BY AUTOMATED COUNT: 13.3 % (ref 10–15)
GFR SERPL CREATININE-BSD FRML MDRD: 85 ML/MIN/1.73M2
GLUCOSE BLD-MCNC: 145 MG/DL (ref 70–99)
GLUCOSE BLDC GLUCOMTR-MCNC: 137 MG/DL (ref 70–99)
GLUCOSE BLDC GLUCOMTR-MCNC: 145 MG/DL (ref 70–99)
GLUCOSE BLDC GLUCOMTR-MCNC: 147 MG/DL (ref 70–99)
GLUCOSE BLDC GLUCOMTR-MCNC: 220 MG/DL (ref 70–99)
GLUCOSE BLDC GLUCOMTR-MCNC: 237 MG/DL (ref 70–99)
GROUP A STREP BY PCR: NOT DETECTED
HCT VFR BLD AUTO: 38.2 % (ref 40–53)
HCT VFR BLD AUTO: 38.4 % (ref 40–53)
HDLC SERPL-MCNC: 36 MG/DL
HGB BLD-MCNC: 13.1 G/DL (ref 13.3–17.7)
HGB BLD-MCNC: 13.2 G/DL (ref 13.3–17.7)
IMM GRANULOCYTES # BLD: 0 10E3/UL
IMM GRANULOCYTES NFR BLD: 0 %
LDLC SERPL CALC-MCNC: 40 MG/DL
LVEF ECHO: NORMAL
LYMPHOCYTES # BLD AUTO: 1.5 10E3/UL (ref 0.8–5.3)
LYMPHOCYTES NFR BLD AUTO: 20 %
MCH RBC QN AUTO: 30.6 PG (ref 26.5–33)
MCH RBC QN AUTO: 31.6 PG (ref 26.5–33)
MCHC RBC AUTO-ENTMCNC: 34.3 G/DL (ref 31.5–36.5)
MCHC RBC AUTO-ENTMCNC: 34.4 G/DL (ref 31.5–36.5)
MCV RBC AUTO: 89 FL (ref 78–100)
MCV RBC AUTO: 92 FL (ref 78–100)
MONOCYTES # BLD AUTO: 0.4 10E3/UL (ref 0–1.3)
MONOCYTES NFR BLD AUTO: 5 %
NEUTROPHILS # BLD AUTO: 5.5 10E3/UL (ref 1.6–8.3)
NEUTROPHILS NFR BLD AUTO: 74 %
NONHDLC SERPL-MCNC: 58 MG/DL
NRBC # BLD AUTO: 0 10E3/UL
NRBC BLD AUTO-RTO: 0 /100
PLATELET # BLD AUTO: 155 10E3/UL (ref 150–450)
PLATELET # BLD AUTO: 157 10E3/UL (ref 150–450)
POTASSIUM BLD-SCNC: 4.2 MMOL/L (ref 3.4–5.3)
RBC # BLD AUTO: 4.15 10E6/UL (ref 4.4–5.9)
RBC # BLD AUTO: 4.32 10E6/UL (ref 4.4–5.9)
RETICS # AUTO: 0.07 10E6/UL (ref 0.03–0.1)
RETICS/RBC NFR AUTO: 1.7 % (ref 0.5–2)
SODIUM SERPL-SCNC: 138 MMOL/L (ref 133–144)
TOTAL PROTEIN SERUM FOR ELP: 6 G/DL (ref 6.4–8.3)
TRIGL SERPL-MCNC: 91 MG/DL
WBC # BLD AUTO: 7.1 10E3/UL (ref 4–11)
WBC # BLD AUTO: 7.6 10E3/UL (ref 4–11)

## 2022-08-02 PROCEDURE — 97116 GAIT TRAINING THERAPY: CPT | Mod: GP | Performed by: PHYSICAL THERAPIST

## 2022-08-02 PROCEDURE — 250N000013 HC RX MED GY IP 250 OP 250 PS 637: Performed by: HOSPITALIST

## 2022-08-02 PROCEDURE — 80048 BASIC METABOLIC PNL TOTAL CA: CPT | Performed by: PHYSICIAN ASSISTANT

## 2022-08-02 PROCEDURE — 97112 NEUROMUSCULAR REEDUCATION: CPT | Mod: GP | Performed by: PHYSICAL THERAPIST

## 2022-08-02 PROCEDURE — 85045 AUTOMATED RETICULOCYTE COUNT: CPT | Performed by: STUDENT IN AN ORGANIZED HEALTH CARE EDUCATION/TRAINING PROGRAM

## 2022-08-02 PROCEDURE — 97530 THERAPEUTIC ACTIVITIES: CPT | Mod: GP | Performed by: PHYSICAL THERAPIST

## 2022-08-02 PROCEDURE — 84165 PROTEIN E-PHORESIS SERUM: CPT | Mod: TC | Performed by: STUDENT IN AN ORGANIZED HEALTH CARE EDUCATION/TRAINING PROGRAM

## 2022-08-02 PROCEDURE — 999N000208 ECHOCARDIOGRAM COMPLETE

## 2022-08-02 PROCEDURE — 97162 PT EVAL MOD COMPLEX 30 MIN: CPT | Mod: GP | Performed by: PHYSICAL THERAPIST

## 2022-08-02 PROCEDURE — 120N000001 HC R&B MED SURG/OB

## 2022-08-02 PROCEDURE — 250N000009 HC RX 250

## 2022-08-02 PROCEDURE — 99233 SBSQ HOSP IP/OBS HIGH 50: CPT | Performed by: HOSPITALIST

## 2022-08-02 PROCEDURE — 85027 COMPLETE CBC AUTOMATED: CPT | Performed by: STUDENT IN AN ORGANIZED HEALTH CARE EDUCATION/TRAINING PROGRAM

## 2022-08-02 PROCEDURE — 250N000013 HC RX MED GY IP 250 OP 250 PS 637: Performed by: STUDENT IN AN ORGANIZED HEALTH CARE EDUCATION/TRAINING PROGRAM

## 2022-08-02 PROCEDURE — 255N000002 HC RX 255 OP 636: Performed by: HOSPITALIST

## 2022-08-02 PROCEDURE — 36415 COLL VENOUS BLD VENIPUNCTURE: CPT | Performed by: PHYSICIAN ASSISTANT

## 2022-08-02 PROCEDURE — 99233 SBSQ HOSP IP/OBS HIGH 50: CPT | Mod: GC | Performed by: STUDENT IN AN ORGANIZED HEALTH CARE EDUCATION/TRAINING PROGRAM

## 2022-08-02 PROCEDURE — 85027 COMPLETE CBC AUTOMATED: CPT | Performed by: PHYSICIAN ASSISTANT

## 2022-08-02 PROCEDURE — 250N000011 HC RX IP 250 OP 636: Performed by: HOSPITALIST

## 2022-08-02 PROCEDURE — 84155 ASSAY OF PROTEIN SERUM: CPT | Performed by: STUDENT IN AN ORGANIZED HEALTH CARE EDUCATION/TRAINING PROGRAM

## 2022-08-02 PROCEDURE — 93306 TTE W/DOPPLER COMPLETE: CPT | Mod: 26 | Performed by: INTERNAL MEDICINE

## 2022-08-02 PROCEDURE — 97112 NEUROMUSCULAR REEDUCATION: CPT | Mod: GO

## 2022-08-02 PROCEDURE — 97530 THERAPEUTIC ACTIVITIES: CPT | Mod: GO

## 2022-08-02 PROCEDURE — 36415 COLL VENOUS BLD VENIPUNCTURE: CPT | Performed by: STUDENT IN AN ORGANIZED HEALTH CARE EDUCATION/TRAINING PROGRAM

## 2022-08-02 PROCEDURE — 250N000009 HC RX 250: Performed by: HOSPITALIST

## 2022-08-02 PROCEDURE — 87651 STREP A DNA AMP PROBE: CPT | Performed by: HOSPITALIST

## 2022-08-02 PROCEDURE — 74177 CT ABD & PELVIS W/CONTRAST: CPT

## 2022-08-02 PROCEDURE — 80061 LIPID PANEL: CPT | Performed by: PHYSICIAN ASSISTANT

## 2022-08-02 PROCEDURE — 250N000011 HC RX IP 250 OP 636: Performed by: STUDENT IN AN ORGANIZED HEALTH CARE EDUCATION/TRAINING PROGRAM

## 2022-08-02 RX ORDER — IOPAMIDOL 755 MG/ML
93 INJECTION, SOLUTION INTRAVASCULAR ONCE
Status: COMPLETED | OUTPATIENT
Start: 2022-08-02 | End: 2022-08-02

## 2022-08-02 RX ORDER — OLANZAPINE 10 MG/2ML
10 INJECTION, POWDER, FOR SOLUTION INTRAMUSCULAR
Status: COMPLETED | OUTPATIENT
Start: 2022-08-02 | End: 2022-08-02

## 2022-08-02 RX ORDER — HYDROCHLOROTHIAZIDE 25 MG/1
25 TABLET ORAL DAILY
Status: DISCONTINUED | OUTPATIENT
Start: 2022-08-02 | End: 2022-08-29

## 2022-08-02 RX ORDER — ATORVASTATIN CALCIUM 10 MG/1
10 TABLET, FILM COATED ORAL EVERY EVENING
Status: DISCONTINUED | OUTPATIENT
Start: 2022-08-02 | End: 2022-08-30 | Stop reason: HOSPADM

## 2022-08-02 RX ORDER — METOPROLOL SUCCINATE 25 MG/1
25 TABLET, EXTENDED RELEASE ORAL DAILY
Status: DISCONTINUED | OUTPATIENT
Start: 2022-08-02 | End: 2022-08-30 | Stop reason: HOSPADM

## 2022-08-02 RX ORDER — LISINOPRIL AND HYDROCHLOROTHIAZIDE 20; 25 MG/1; MG/1
1 TABLET ORAL DAILY
Status: DISCONTINUED | OUTPATIENT
Start: 2022-08-02 | End: 2022-08-02

## 2022-08-02 RX ORDER — LISINOPRIL 20 MG/1
20 TABLET ORAL DAILY
Status: DISCONTINUED | OUTPATIENT
Start: 2022-08-02 | End: 2022-08-17

## 2022-08-02 RX ORDER — WATER 10 ML/10ML
INJECTION INTRAMUSCULAR; INTRAVENOUS; SUBCUTANEOUS
Status: COMPLETED
Start: 2022-08-02 | End: 2022-08-02

## 2022-08-02 RX ADMIN — IOPAMIDOL 93 ML: 755 INJECTION, SOLUTION INTRAVENOUS at 14:35

## 2022-08-02 RX ADMIN — APIXABAN 5 MG: 5 TABLET, FILM COATED ORAL at 09:27

## 2022-08-02 RX ADMIN — HYDROCHLOROTHIAZIDE 25 MG: 25 TABLET ORAL at 09:27

## 2022-08-02 RX ADMIN — SODIUM CHLORIDE 68 ML: 900 INJECTION INTRAVENOUS at 14:35

## 2022-08-02 RX ADMIN — INSULIN ASPART 1 UNITS: 100 INJECTION, SOLUTION INTRAVENOUS; SUBCUTANEOUS at 00:47

## 2022-08-02 RX ADMIN — HUMAN ALBUMIN MICROSPHERES AND PERFLUTREN 9 ML: 10; .22 INJECTION, SOLUTION INTRAVENOUS at 10:34

## 2022-08-02 RX ADMIN — ATORVASTATIN CALCIUM 10 MG: 10 TABLET, FILM COATED ORAL at 20:26

## 2022-08-02 RX ADMIN — TAMSULOSIN HYDROCHLORIDE 0.4 MG: 0.4 CAPSULE ORAL at 09:27

## 2022-08-02 RX ADMIN — WATER 10 ML: 1 INJECTION INTRAMUSCULAR; INTRAVENOUS; SUBCUTANEOUS at 21:50

## 2022-08-02 RX ADMIN — OLANZAPINE 10 MG: 10 INJECTION, POWDER, LYOPHILIZED, FOR SOLUTION INTRAMUSCULAR at 21:50

## 2022-08-02 RX ADMIN — METOPROLOL SUCCINATE 25 MG: 25 TABLET, EXTENDED RELEASE ORAL at 09:27

## 2022-08-02 RX ADMIN — LISINOPRIL 20 MG: 20 TABLET ORAL at 09:27

## 2022-08-02 RX ADMIN — APIXABAN 5 MG: 5 TABLET, FILM COATED ORAL at 20:26

## 2022-08-02 ASSESSMENT — ACTIVITIES OF DAILY LIVING (ADL)
ADLS_ACUITY_SCORE: 37
ADLS_ACUITY_SCORE: 37
ADLS_ACUITY_SCORE: 41
ADLS_ACUITY_SCORE: 37
ADLS_ACUITY_SCORE: 41
ADLS_ACUITY_SCORE: 37
ADLS_ACUITY_SCORE: 41
ADLS_ACUITY_SCORE: 41
ADLS_ACUITY_SCORE: 37

## 2022-08-02 NOTE — PROGRESS NOTES
"      [facility]    Stroke Consult Note    Reason for Consult:  R Cerebellar stroke    Chief Complaint: Slurred Speech       HPI  Nathan Macias is a 89 year old male on Eliquis for Afib presents with severe dysarthria and aphasia. He notes he is having difficulty with his speech. He was LNK at 1130 last night and woke up at 430am with aphasia. He reports taking his Eliquis last night.     Stroke Evaluation Summarized    MRI/Head CT IMPRESSION:  1. Acute right cerebellar infarct.  2. 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  3. Chronic changes as detailed.   Intracranial Vasculature                                                                  IMPRESSION:   CTA Head:   1. 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   2. No other large vessel occlusions or high-grade stenoses are  identified.   Cervical Vasculature CTA Neck:   1. No evidence of large vessel occlusion or high-grade stenosis.      Echocardiogram . The left ventricle is normal in structure, function and size. The visual  ejection fraction is estimated at 55%.  2. The right ventricle is normal in structure, function and size.  3. No valve disease.     No previous echo for comparison.   EKG/Telemetry SR w/ PVCs L ant fasicular block   Other Testing CT chest/ab/pelvis ordered     LDL  8/1/2022: 40 mg/dL   A1C  8/1/2022: 7.1 %   Troponin No lab value available in past 48 hrs       Impression  Acute ischemic stroke of R cerebllar  due to cardioembolism vs large artery atherosclerosis vs other etiology such as malignancy. He has a history of pipe smoking which increases his risk of malignancy, given that he had a stroke through his Eliquis raises the concern of malignancy.     Recommendations  - Use orderset: \"Ischemic Stroke Routine Admission\"   - Neurochecks and Vital Signs every Q4H   - Ok to normalize BP- resume home BP meds  - Statin: Lipitor 10mg- home dose, LDL 40  - Telemetry, EKG  - " "Bedside Glucose Monitoring, Blood glucose goal <180  - PT/OT/SLP  - Stroke Education  - Euthermia, Euglycemia  - Continue Eliquis 5mg BID  - Add aspirin 81mg for vertebral artery atherosclerosis pending CT results  - Stat Head CT for any neuro changes  - Long term goals < 130/80, LDL goal <70, A1c goal <7  - CT Chest/abdomen/ pelvis for malignancy, SPEP, peripheral smear          Patient Follow-up    - final recommendation pending work-up    Thank you for this consult. We will continue to follow.     The Stroke Staff is Dr. Guillory.    Janeth Walton MD  Vascular Neurology Fellow  To page me or covering stroke neurology team member, click here: [facility]   Choose \"On Call\" tab at top, then search dropdown box for \"Neurology Adult\", select location, press Enter, then look for stroke/neuro ICU/telestroke.    _____________________________________________________    Clinically Significant Risk Factors Present on Admission     Past Medical History   Past Medical History:   Diagnosis Date     A-fib (H)      BPH (benign prostatic hyperplasia)      CAD (coronary artery disease)      DMII (diabetes mellitus, type 2) (H)      TIA (transient ischemic attack)      Past Surgical History   Past Surgical History:   Procedure Laterality Date     BACK SURGERY      1990s     TONGUE SURGERY      due to precancerous lesion     Medications   Home Meds  Prior to Admission medications    Medication Sig Start Date End Date Taking? Authorizing Provider   apixaban ANTICOAGULANT (ELIQUIS) 5 MG tablet Take 5 mg by mouth 2 times daily   Yes Unknown, Entered By History   atorvastatin (LIPITOR) 20 MG tablet Take 10 mg by mouth daily   Yes Unknown, Entered By History   lisinopril-hydrochlorothiazide (ZESTORETIC) 20-25 MG tablet Take 1 tablet by mouth daily   Yes Unknown, Entered By History   loratadine (CLARITIN) 10 MG tablet Take 10 mg by mouth daily   Yes Unknown, Entered By History   metFORMIN (GLUCOPHAGE XR) 500 MG 24 hr tablet Take 500 mg by mouth " daily (with breakfast)   Yes Unknown, Entered By History   metoprolol succinate ER (TOPROL XL) 50 MG 24 hr tablet Take 25 mg by mouth daily   Yes Unknown, Entered By History   tamsulosin (FLOMAX) 0.4 MG capsule Take 0.4 mg by mouth daily   Yes Unknown, Entered By History       Scheduled Meds    atorvastatin  40 mg Oral or Feeding Tube QPM     insulin aspart  1-4 Units Subcutaneous Q4H     sodium chloride (PF)  3 mL Intracatheter Q8H     tamsulosin  0.4 mg Oral Daily       Infusion Meds    - MEDICATION INSTRUCTIONS -       - MEDICATION INSTRUCTIONS -       - MEDICATION INSTRUCTIONS -         PRN Meds  glucose **OR** dextrose **OR** glucagon, lidocaine 4%, lidocaine (buffered or not buffered), - MEDICATION INSTRUCTIONS -, - MEDICATION INSTRUCTIONS -, metoprolol, - MEDICATION INSTRUCTIONS -, sodium chloride (PF)    Allergies   No Known Allergies  Family History   No family history on file.  Social History   Social History     Tobacco Use     Smoking status: Former Smoker     Years: 25.00     Types: Cigars, Pipe     Quit date:      Years since quittin.6   Substance Use Topics     Alcohol use: Never       Review of Systems   The 5 point Review of Systems is negative other than noted in the HPI or here.        PHYSICAL EXAMINATION   Temp:  [97.4  F (36.3  C)-98.2  F (36.8  C)] 97.5  F (36.4  C)  Pulse:  [44-85] 67  Resp:  [11-25] 21  BP: (138-188)/() 166/82  SpO2:  [94 %-98 %] 94 %    Neurologic  Mental Status:  alert, oriented x 3, follows commands, speech with mild dysarthria and fluent, naming and repetition normal  Cranial Nerves:  visual fields intact, EOMI with normal smooth pursuit, facial sensation intact and symmetric, facial movements symmetric, hearing not formally tested but intact to conversation, shoulder shrug strong bilaterally, tongue protrusion midline, mild dysarthria   Motor:  normal muscle tone and bulk, no abnormal movements, able to move all limbs spontaneously, strength 5/5  throughout upper and lower extremities, no pronator drift  Reflexes:  toes down-going  Sensory:  light touch sensation intact and symmetric throughout upper and lower extremities, no extinction on double simultaneous stimulation   Coordination:  decreased JAGDISH in RUE and RLE, ataxia with finger to nose and heel to shin with RUE and RLE. normal on the left  Station/Gait:  deferred    Dysphagia Screen  Per Nursing    Stroke Scales    NIHSS  1a. Level of Consciousness 0-->Alert, keenly responsive   1b. LOC Questions 0-->Answers both questions correctly   1c. LOC Commands 0-->Performs both tasks correctly   2.   Best Gaze 0-->Normal   3.   Visual 0-->No visual loss   4.   Facial Palsy 0-->Normal symmetrical movements   5a. Motor Arm, Left 0-->No drift, limb holds 90 (or 45) degrees for full 10 secs   5b. Motor Arm, Right 0-->No drift, limb holds 90 (or 45) degrees for full 10 secs   6a. Motor Leg, Left 0-->No drift, leg holds 30 degree position for full 5 secs   6b. Motor Leg, right 0-->No drift, leg holds 30 degree position for full 5 secs   7.   Limb Ataxia 2-->Present in two limbs   8.   Sensory 0-->Normal, no sensory loss   9.   Best Language 1-->Mild-to-moderate aphasia, some obvious loss of fluency or facility of comprehension, without significant limitation on ideas expressed or form of expression. Reduction of speech and/or comprehension, however, makes conversation. . . (see row details)   10. Dysarthria 2-->Severe dysarthria, patients speech is so slurred as to be unintelligible in the absence of or out of proportion to any dysphasia, or is mute/anarthric   11. Extinction and Inattention  0-->No abnormality   Total 5 (08/01/22 1407)       Modified [place] Score (Pre-morbid)  1 - No significant disability.  Able to carry out all usual activities, despite some symptoms.    Imaging  I personally reviewed all imaging; relevant findings per HPI.    Labs Data   CBC  Recent Labs   Lab 08/01/22  0939   WBC 5.6   RBC  3.58*   HGB 11.0*   HCT 32.9*   *     Basic Metabolic Panel   Recent Labs   Lab 08/02/22  0341 08/02/22  0047 08/01/22  2111 08/01/22  1308 08/01/22  0939   NA  --   --   --   --  134   POTASSIUM  --   --   --   --  4.2   CHLORIDE  --   --   --   --  106   CO2  --   --   --   --  24   BUN  --   --   --   --  15   CR  --   --   --   --  0.82   * 147* 200*   < > 293*   MAURICIO  --   --   --   --  7.5*    < > = values in this interval not displayed.     Liver Panel  No results for input(s): PROTTOTAL, ALBUMIN, BILITOTAL, ALKPHOS, AST, ALT, BILIDIRECT in the last 168 hours.  INR    Recent Labs   Lab Test 08/01/22  0939   INR 1.37*      Lipid Profile    Recent Labs   Lab Test 08/01/22  1430   CHOL 100   HDL 38*   LDL 40   TRIG 110     A1C    Recent Labs   Lab Test 08/01/22  1430 08/01/22  0939   A1C 7.1* 7.3*     Troponin I    Recent Labs   Lab 08/01/22  1430 08/01/22  1400 08/01/22  0939   TROPONINIS 90* 78 68          Stroke Consult Data Data   This was a non-emergent, non-telestroke consult.

## 2022-08-02 NOTE — PHARMACY-CONSULT NOTE
Pharmacy Consult to evaluate for medication related stroke core measures    Nathan Templecrista Macias, 89 year old male admitted for Acute ischemic stroke of R cerebellum on 8/1/2022.    Thrombolytic was not given because of Time from onset contraindications    VTE Prophylaxis Apixiban     Anticoagulation if history of A-fib/flutter: Patient on apixaban (Eliquis); continue anticoagulation on discharge to meet quality measures, unless contraindicated.    LDL Cholesterol Calculated   Date Value Ref Range Status   08/02/2022 40 <=100 mg/dL Final       Patient currently receiving Lipitor (atorvastatin) continue statin on discharge to meet quality measures, unless contraindicated.    Recommendations: None at this time    Thank you for the consult.    Sita Sanz [facility] 8/2/2022 9:38 AM   
less than 2 seconds

## 2022-08-02 NOTE — PROGRESS NOTES
[facility]    Hospitalist Progress Note    Assessment & Plan   .Nathan Macias is a 89 year old male with a history of afib on Eliquis who was brought to the ED with dysarthria, aphasia and ataxia and noted to have an acute ischemic stroke of the RIGHT cerebellum. He is admitted for further evaluation and management.      Acute ischemic stroke of RIGHT cerebellum  Likely due to cardioembolism vs small vessel disease vs other  expressive aphasia (improving)  - Continue PTA statin.  - Resume PTA BP meds.  - Resume eliquis.  - neurochecks q4H   - Follow TTE  - PT/OT/SLP.  - Follow neuro recs.       Atrial fibrillation  - Resume PTA BB and apixaban.     DMII. A1C is 7.1.  - hold metformin  - sliding scale insulin     Hypertension  - Resume PTA meds.  - Prn hydralazine.     Sore throat:  - Cepacol.  - rapid strep ag test.      DVT Prophylaxis: apixaban.  Code Status: Full Code     Disposition: when ok with neurology.    Melecio Quach MD, MD      Interval History   Dysarthria but states it is improving. C/o sore throat. Denies cp/sob. Denies focal weakness/numbness.        Physical Exam   Temp: 97.5  F (36.4  C) Temp src: Oral BP: (!) 166/82 Pulse: 67   Resp: 21 SpO2: 94 % O2 Device: None (Room air)    Vitals:    08/01/22 0944   Weight: 84 kg (185 lb 3 oz)     Vital Signs with Ranges  Temp:  [97.4  F (36.3  C)-98.2  F (36.8  C)] 97.5  F (36.4  C)  Pulse:  [44-85] 67  Resp:  [11-25] 21  BP: (138-188)/() 166/82  SpO2:  [94 %-98 %] 94 %  I/O last 3 completed shifts:  In: 240 [P.O.:240]  Out: 1425 [Urine:1425]    Respiratory: Clear to auscultation bilaterally, no crackles or wheezing  Cardiovascular: Regular rate and rhythm, normal S1 and S2, and no murmur noted  GI: Normal bowel sounds, soft, non-distended, non-tender  Skin/Integumen: No rashes, no cyanosis, no edema  Other:     Medications     - MEDICATION INSTRUCTIONS -       - MEDICATION INSTRUCTIONS -       - MEDICATION  INSTRUCTIONS -         atorvastatin  40 mg Oral or Feeding Tube QPM     insulin aspart  1-4 Units Subcutaneous Q4H     sodium chloride (PF)  3 mL Intracatheter Q8H     tamsulosin  0.4 mg Oral Daily       Data   Recent Labs   Lab 08/02/22  0341 08/02/22  0047 08/01/22  2111 08/01/22  1308 08/01/22  0939   WBC  --   --   --   --  5.6   HGB  --   --   --   --  11.0*   MCV  --   --   --   --  92   PLT  --   --   --   --  147*   INR  --   --   --   --  1.37*   NA  --   --   --   --  134   POTASSIUM  --   --   --   --  4.2   CHLORIDE  --   --   --   --  106   CO2  --   --   --   --  24   BUN  --   --   --   --  15   CR  --   --   --   --  0.82   ANIONGAP  --   --   --   --  4   MAURICIO  --   --   --   --  7.5*   * 147* 200*   < > 293*    < > = values in this interval not displayed.       Recent Results (from the past 24 hour(s))   CT Head w/o Contrast    Narrative    CT SCAN OF THE HEAD WITHOUT CONTRAST   8/1/2022 9:23 AM     HISTORY: Code stroke., Aphasia    TECHNIQUE:  Axial images of the head and coronal reformations without  IV contrast material. Radiation dose for this scan was reduced using  automated exposure control, adjustment of the mA and/or kV according  to patient size, or iterative reconstruction technique.    COMPARISON: Head CT 3/22/2004    FINDINGS:  Mild volume loss is present. White matter hypoattenuation  likely represents mild to moderate chronic small vessel ischemic  change. Parenchyma is otherwise unremarkable. Small old infarct  involving the left prefrontal cortex in the anterior cerebral artery  distribution, questionably present on the prior exam. No evidence of  acute ischemia, hemorrhage, mass, mass effect or hydrocephalus. The  visualized calvarium, tympanic cavities, mastoid cavities, and  extracranial soft tissues are unremarkable. Left maxillary sinus  retention cyst. Bilateral lens replacements.      Impression    IMPRESSION:    1. No CT evidence of acute ischemia or hemorrhage  (ASPECTS 10).  2. Small old infarct involving the left prefrontal cortex in the  anterior cerebral artery distribution.    Findings were discussed by phone between Dr. Alejandro and Dr. Vaqsuez  at 9:36 AM on 8/1/2022.    MORRO ALEJANDRO MD         SYSTEM ID:  [id number]   CTA Head Neck w Contrast    Narrative    CT ANGIOGRAM OF THE HEAD AND NECK WITH CONTRAST  8/1/2022 9:32 AM     HISTORY: Code stroke, aphasia.    TECHNIQUE:  CT angiography with an injection of 75mL Isovue-370 IV  with scans through the head and neck. Images were transferred to a  separate 3-D workstation where multiplanar reformations and 3-D images  were created. Estimates of carotid stenoses are made relative to the  distal internal carotid artery diameters except as noted. Radiation  dose for this scan was reduced using automated exposure control,  adjustment of the mA and/or kV according to patient size, or iterative  reconstruction technique.    COMPARISON: None.     CT ANGIOGRAM HEAD FINDINGS:    The left anterior cerebral artery is occluded at the origin. No other  large vessel occlusions or high-grade stenoses are identified The  internal carotid arteries, right anterior cerebral artery, middle  cerebral arteries, vertebral arteries, basilar artery, and posterior  cerebral arteries are patent. Fetal origin of the right posterior  cerebral artery. Scattered atherosclerotic plaquing with multiple mild  stenoses. No aneurysm or vascular malformation is identified. Probable  infundibulum of the left middle cerebral artery M1 segment. Dural  venous sinuses are unremarkable.     CT ANGIOGRAM NECK FINDINGS:   The bilateral common carotid, internal carotid, external carotid, and  vertebral arteries are patent. No evidence of large vessel occlusion  or high-grade stenosis. Scattered atherosclerotic plaquing. No  evidence of dissection.     Cervical spine degenerative changes with partially calcified pannus  formation and erosion of the dens. Left  maxillary sinus retention  cyst.       Impression    IMPRESSION:   CTA Head:   1. 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   2. No other large vessel occlusions or high-grade stenoses are  identified.  CTA Neck:   1. No evidence of large vessel occlusion or high-grade stenosis.     MORRO CORREA MD         SYSTEM ID:  [id number]   CT Head Perfusion w Contrast    Narrative    CT BRAIN PERFUSION  8/1/2022 9:33 AM    HISTORY: Code stroke.    TECHNIQUE: Time sequential axial CT images of the head were acquired  during the administration of 125 mL Isovue-370 total IV. Color  perfusion maps of the brain were created from this time sequential  axial source data.     Radiation dose for this scan was reduced using automated exposure  control, adjustment of the mA and/or kV according to patient size, or  iterative reconstruction technique.    COMPARISON: None.      Impression    IMPRESSION: Mild transit time prolongation is present corresponding to  the left anterior cerebral artery distribution with compensated  cerebral blood volumes. Otherwise, no focal perfusion defects are  identified.    MORRO CORREA MD         SYSTEM ID:  [id number]   MR Brain w/o & w Contrast    Narrative    MRI BRAIN WITHOUT AND WITH CONTRAST  8/1/2022 10:36 AM    HISTORY:  Hyperacute rule out stroke.     TECHNIQUE:  Multiplanar, multisequence MRI of the brain without and  with 8 mL Gadavist.    COMPARISON: Same day head CT    FINDINGS:  Small area of diffusion restriction within the right  cerebellar hemisphere. No other areas of diffusion restriction. No  convincing evidence of hemorrhage. Mild parenchymal volume loss is  present with white matter T2 hyperintensities which likely represent  chronic small vessel ischemic change. Probable old basal  ganglia/periventricular lacunar infarcts. Small old infarct involving  the left prefrontal cortex.    Marrow signal is within normal limits. Left maxillary  sinus retention  cyst. Trace right mastoid cavity opacification. Bilateral lens  replacements.      Impression    IMPRESSION:  1. Acute right cerebellar infarct.  2. 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  3. Chronic changes as detailed.    MORRO CORREA MD         SYSTEM ID:  [id number]

## 2022-08-02 NOTE — CONSULTS
08/02/22 1416 Klisch, Christine M, RN     Stroke Education Note     The following information has been reviewed with the patient:     1. Warning signs of stroke     2. Calling 911 if having warning signs of stroke     3. All modifiable risk factors: hypertension, CAD, atrial fib, diabetes, hypercholesterolemia, smoking, substance abuse, diet, physical inactivity, obesity, sleep apnea.     4. Patient's risk factors for stroke which include: HTN,AFib,HLD     5. Follow-up plan for after discharge     6. Discharge medications which include: eliquis,lipitor,toprol,lisinopril, HCTZ     In addition, the above information was given to the patient in writing as a part of the [facility] Stroke Class Handout.     Learner's response to risk factors / lifestyle modification education: Taking steps      Christine M Klisch, RN

## 2022-08-02 NOTE — PROGRESS NOTES
Patient is Alert and Oriented x 3 with forgetfulness, vss, a-febrile, denies pain, up with A1 GB and walker to the bathroom, Neuros are intact except garbled speech and word finding difficulty, tolerating pureed diet with thin liquids, tele a-fib with CVR, OT/PT following, plan to discharge to ARU when clinically stable.

## 2022-08-03 ENCOUNTER — APPOINTMENT (OUTPATIENT)
Dept: SPEECH THERAPY | Facility: CLINIC | Age: 87
DRG: 065 | End: 2022-08-03
Payer: COMMERCIAL

## 2022-08-03 ENCOUNTER — APPOINTMENT (OUTPATIENT)
Dept: OCCUPATIONAL THERAPY | Facility: CLINIC | Age: 87
DRG: 065 | End: 2022-08-03
Payer: COMMERCIAL

## 2022-08-03 ENCOUNTER — APPOINTMENT (OUTPATIENT)
Dept: PHYSICAL THERAPY | Facility: CLINIC | Age: 87
DRG: 065 | End: 2022-08-03
Payer: COMMERCIAL

## 2022-08-03 ENCOUNTER — APPOINTMENT (OUTPATIENT)
Dept: GENERAL RADIOLOGY | Facility: CLINIC | Age: 87
DRG: 065 | End: 2022-08-03
Attending: HOSPITALIST
Payer: COMMERCIAL

## 2022-08-03 LAB
ALBUMIN SERPL ELPH-MCNC: 3 G/DL (ref 3.7–5.1)
ALBUMIN UR-MCNC: 30 MG/DL
ALPHA1 GLOB SERPL ELPH-MCNC: 0.2 G/DL (ref 0.2–0.4)
ALPHA2 GLOB SERPL ELPH-MCNC: 0.7 G/DL (ref 0.5–0.9)
ANION GAP SERPL CALCULATED.3IONS-SCNC: 6 MMOL/L (ref 3–14)
APPEARANCE UR: CLEAR
B-GLOBULIN SERPL ELPH-MCNC: 0.7 G/DL (ref 0.6–1)
BASOPHILS # BLD AUTO: 0 10E3/UL (ref 0–0.2)
BASOPHILS NFR BLD AUTO: 0 %
BILIRUB UR QL STRIP: NEGATIVE
BUN SERPL-MCNC: 22 MG/DL (ref 7–30)
CALCIUM SERPL-MCNC: 8.9 MG/DL (ref 8.5–10.1)
CHLORIDE BLD-SCNC: 105 MMOL/L (ref 94–109)
CO2 SERPL-SCNC: 28 MMOL/L (ref 20–32)
COLOR UR AUTO: ABNORMAL
CREAT SERPL-MCNC: 0.97 MG/DL (ref 0.66–1.25)
EOSINOPHIL # BLD AUTO: 0.1 10E3/UL (ref 0–0.7)
EOSINOPHIL NFR BLD AUTO: 1 %
ERYTHROCYTE [DISTWIDTH] IN BLOOD BY AUTOMATED COUNT: 13.4 % (ref 10–15)
GAMMA GLOB SERPL ELPH-MCNC: 1.2 G/DL (ref 0.7–1.6)
GFR SERPL CREATININE-BSD FRML MDRD: 75 ML/MIN/1.73M2
GLUCOSE BLD-MCNC: 166 MG/DL (ref 70–99)
GLUCOSE BLDC GLUCOMTR-MCNC: 156 MG/DL (ref 70–99)
GLUCOSE BLDC GLUCOMTR-MCNC: 161 MG/DL (ref 70–99)
GLUCOSE BLDC GLUCOMTR-MCNC: 181 MG/DL (ref 70–99)
GLUCOSE BLDC GLUCOMTR-MCNC: 210 MG/DL (ref 70–99)
GLUCOSE BLDC GLUCOMTR-MCNC: 235 MG/DL (ref 70–99)
GLUCOSE UR STRIP-MCNC: NEGATIVE MG/DL
HCT VFR BLD AUTO: 42.7 % (ref 40–53)
HGB BLD-MCNC: 14.3 G/DL (ref 13.3–17.7)
HGB UR QL STRIP: ABNORMAL
IMM GRANULOCYTES # BLD: 0 10E3/UL
IMM GRANULOCYTES NFR BLD: 0 %
KETONES UR STRIP-MCNC: NEGATIVE MG/DL
LEUKOCYTE ESTERASE UR QL STRIP: ABNORMAL
LYMPHOCYTES # BLD AUTO: 1.4 10E3/UL (ref 0.8–5.3)
LYMPHOCYTES NFR BLD AUTO: 18 %
M PROTEIN SERPL ELPH-MCNC: 0 G/DL
MCH RBC QN AUTO: 31 PG (ref 26.5–33)
MCHC RBC AUTO-ENTMCNC: 33.5 G/DL (ref 31.5–36.5)
MCV RBC AUTO: 93 FL (ref 78–100)
MONOCYTES # BLD AUTO: 0.5 10E3/UL (ref 0–1.3)
MONOCYTES NFR BLD AUTO: 6 %
MUCOUS THREADS #/AREA URNS LPF: PRESENT /LPF
NEUTROPHILS # BLD AUTO: 5.6 10E3/UL (ref 1.6–8.3)
NEUTROPHILS NFR BLD AUTO: 75 %
NITRATE UR QL: NEGATIVE
NRBC # BLD AUTO: 0 10E3/UL
NRBC BLD AUTO-RTO: 0 /100
PATH REPORT.COMMENTS IMP SPEC: NORMAL
PATH REPORT.COMMENTS IMP SPEC: NORMAL
PATH REPORT.FINAL DX SPEC: NORMAL
PATH REPORT.MICROSCOPIC SPEC OTHER STN: NORMAL
PATH REPORT.MICROSCOPIC SPEC OTHER STN: NORMAL
PATH REPORT.RELEVANT HX SPEC: NORMAL
PH UR STRIP: 7 [PH] (ref 5–7)
PLATELET # BLD AUTO: 161 10E3/UL (ref 150–450)
POTASSIUM BLD-SCNC: 4 MMOL/L (ref 3.4–5.3)
PROT PATTERN SERPL ELPH-IMP: ABNORMAL
RBC # BLD AUTO: 4.61 10E6/UL (ref 4.4–5.9)
RBC URINE: 176 /HPF
RETICS # AUTO: 0.07 10E6/UL (ref 0.03–0.1)
RETICS/RBC NFR AUTO: 1.6 % (ref 0.5–2)
SODIUM SERPL-SCNC: 139 MMOL/L (ref 133–144)
SP GR UR STRIP: 1.02 (ref 1–1.03)
SQUAMOUS EPITHELIAL: <1 /HPF
UROBILINOGEN UR STRIP-MCNC: NORMAL MG/DL
WBC # BLD AUTO: 7.6 10E3/UL (ref 4–11)
WBC URINE: 37 /HPF

## 2022-08-03 PROCEDURE — 84165 PROTEIN E-PHORESIS SERUM: CPT | Mod: 26

## 2022-08-03 PROCEDURE — 92523 SPEECH SOUND LANG COMPREHEN: CPT | Mod: GN | Performed by: SPEECH-LANGUAGE PATHOLOGIST

## 2022-08-03 PROCEDURE — 85045 AUTOMATED RETICULOCYTE COUNT: CPT | Performed by: STUDENT IN AN ORGANIZED HEALTH CARE EDUCATION/TRAINING PROGRAM

## 2022-08-03 PROCEDURE — 81001 URINALYSIS AUTO W/SCOPE: CPT | Performed by: HOSPITALIST

## 2022-08-03 PROCEDURE — 250N000013 HC RX MED GY IP 250 OP 250 PS 637: Performed by: HOSPITALIST

## 2022-08-03 PROCEDURE — 97530 THERAPEUTIC ACTIVITIES: CPT | Mod: GO

## 2022-08-03 PROCEDURE — 99233 SBSQ HOSP IP/OBS HIGH 50: CPT | Performed by: HOSPITALIST

## 2022-08-03 PROCEDURE — 97112 NEUROMUSCULAR REEDUCATION: CPT | Mod: GP

## 2022-08-03 PROCEDURE — 97530 THERAPEUTIC ACTIVITIES: CPT | Mod: GP

## 2022-08-03 PROCEDURE — 36415 COLL VENOUS BLD VENIPUNCTURE: CPT | Performed by: HOSPITALIST

## 2022-08-03 PROCEDURE — 97535 SELF CARE MNGMENT TRAINING: CPT | Mod: GO

## 2022-08-03 PROCEDURE — 87086 URINE CULTURE/COLONY COUNT: CPT | Performed by: HOSPITALIST

## 2022-08-03 PROCEDURE — 92526 ORAL FUNCTION THERAPY: CPT | Mod: GN | Performed by: SPEECH-LANGUAGE PATHOLOGIST

## 2022-08-03 PROCEDURE — 80048 BASIC METABOLIC PNL TOTAL CA: CPT | Performed by: HOSPITALIST

## 2022-08-03 PROCEDURE — 85049 AUTOMATED PLATELET COUNT: CPT | Performed by: STUDENT IN AN ORGANIZED HEALTH CARE EDUCATION/TRAINING PROGRAM

## 2022-08-03 PROCEDURE — 250N000011 HC RX IP 250 OP 636: Performed by: INTERNAL MEDICINE

## 2022-08-03 PROCEDURE — 120N000001 HC R&B MED SURG/OB

## 2022-08-03 PROCEDURE — 71046 X-RAY EXAM CHEST 2 VIEWS: CPT

## 2022-08-03 PROCEDURE — 250N000013 HC RX MED GY IP 250 OP 250 PS 637: Performed by: STUDENT IN AN ORGANIZED HEALTH CARE EDUCATION/TRAINING PROGRAM

## 2022-08-03 PROCEDURE — 97116 GAIT TRAINING THERAPY: CPT | Mod: GP

## 2022-08-03 PROCEDURE — 99221 1ST HOSP IP/OBS SF/LOW 40: CPT | Performed by: UROLOGY

## 2022-08-03 PROCEDURE — 99233 SBSQ HOSP IP/OBS HIGH 50: CPT | Mod: GC | Performed by: STUDENT IN AN ORGANIZED HEALTH CARE EDUCATION/TRAINING PROGRAM

## 2022-08-03 PROCEDURE — 85060 BLOOD SMEAR INTERPRETATION: CPT | Performed by: PATHOLOGY

## 2022-08-03 RX ORDER — CEFUROXIME AXETIL 250 MG/1
250 TABLET ORAL EVERY 12 HOURS SCHEDULED
Status: DISCONTINUED | OUTPATIENT
Start: 2022-08-03 | End: 2022-08-06

## 2022-08-03 RX ORDER — ASPIRIN 81 MG/1
81 TABLET, CHEWABLE ORAL DAILY
Status: DISCONTINUED | OUTPATIENT
Start: 2022-08-03 | End: 2022-08-30 | Stop reason: HOSPADM

## 2022-08-03 RX ORDER — OLANZAPINE 10 MG/2ML
2.5 INJECTION, POWDER, FOR SOLUTION INTRAMUSCULAR EVERY 6 HOURS PRN
Status: DISCONTINUED | OUTPATIENT
Start: 2022-08-03 | End: 2022-08-06

## 2022-08-03 RX ADMIN — CEFUROXIME AXETIL 250 MG: 250 TABLET ORAL at 20:12

## 2022-08-03 RX ADMIN — LISINOPRIL 20 MG: 20 TABLET ORAL at 09:04

## 2022-08-03 RX ADMIN — ASPIRIN 81 MG CHEWABLE TABLET 81 MG: 81 TABLET CHEWABLE at 13:43

## 2022-08-03 RX ADMIN — APIXABAN 5 MG: 5 TABLET, FILM COATED ORAL at 09:04

## 2022-08-03 RX ADMIN — METOPROLOL SUCCINATE 25 MG: 25 TABLET, EXTENDED RELEASE ORAL at 09:04

## 2022-08-03 RX ADMIN — OLANZAPINE 2.5 MG: 10 INJECTION, POWDER, LYOPHILIZED, FOR SOLUTION INTRAMUSCULAR at 17:52

## 2022-08-03 RX ADMIN — ATORVASTATIN CALCIUM 10 MG: 10 TABLET, FILM COATED ORAL at 20:12

## 2022-08-03 RX ADMIN — HYDROCHLOROTHIAZIDE 25 MG: 25 TABLET ORAL at 09:04

## 2022-08-03 RX ADMIN — TAMSULOSIN HYDROCHLORIDE 0.4 MG: 0.4 CAPSULE ORAL at 09:04

## 2022-08-03 RX ADMIN — APIXABAN 5 MG: 5 TABLET, FILM COATED ORAL at 20:12

## 2022-08-03 ASSESSMENT — ACTIVITIES OF DAILY LIVING (ADL)
ADLS_ACUITY_SCORE: 45
ADLS_ACUITY_SCORE: 41
ADLS_ACUITY_SCORE: 45
ADLS_ACUITY_SCORE: 49
ADLS_ACUITY_SCORE: 45
ADLS_ACUITY_SCORE: 49
ADLS_ACUITY_SCORE: 45
ADLS_ACUITY_SCORE: 45
ADLS_ACUITY_SCORE: 41
ADLS_ACUITY_SCORE: 51

## 2022-08-03 NOTE — PROGRESS NOTES
"      [facility]    Stroke Consult Note    Reason for Consult:  R Cerebellar stroke    Chief Complaint: Slurred Speech       HPI  Nathan Macias is a 89 year old male on Eliquis for Afib presents with severe dysarthria and aphasia. He notes he is having difficulty with his speech. He was LNK at 1130 last night and woke up at 430am with aphasia. He reports taking his Eliquis last night.     Stroke Evaluation Summarized    MRI/Head CT IMPRESSION:  1. Acute right cerebellar infarct.  2. 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  3. Chronic changes as detailed.   Intracranial Vasculature                                                                  IMPRESSION:   CTA Head:   1. 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   2. No other large vessel occlusions or high-grade stenoses are  identified.   Cervical Vasculature CTA Neck:   1. No evidence of large vessel occlusion or high-grade stenosis.      Echocardiogram . The left ventricle is normal in structure, function and size. The visual  ejection fraction is estimated at 55%.  2. The right ventricle is normal in structure, function and size.  3. No valve disease.     No previous echo for comparison.   EKG/Telemetry SR w/ PVCs L ant fasicular block   Other Testing CT chest/ab/pelvis ordered     LDL  8/2/2022: 40 mg/dL   A1C  8/1/2022: 7.1 %   Troponin No lab value available in past 48 hrs       Impression  Acute ischemic stroke of R cerebllar  due to cardioembolism vs large artery atherosclerosis vs other etiology such as malignancy. He has a history of pipe smoking which increases his risk of malignancy, given that he had a stroke through his Eliquis raises the concern of malignancy. CT chest/abdomen/pelvis showed a 3mm Right lung nodule and prostatomegaly.     Recommendations  - Use orderset: \"Ischemic Stroke Routine Admission\"   - Neurochecks and Vital Signs every Q4H   - Ok to normalize BP- resume " "home BP meds  - Statin: Lipitor 10mg- home dose, LDL 40  - Telemetry, EKG  - Bedside Glucose Monitoring, Blood glucose goal <180  - PT/OT/SLP  - Stroke Education  - Euthermia, Euglycemia  - Continue Eliquis 5mg BID  - Add aspirin 81mg for vertebral artery atherosclerosis   - Stat Head CT for any neuro changes  - Long term goals < 130/80, LDL goal <70, A1c goal <7            Patient Follow-up    - final recommendation pending work-up    Thank you for this consult. We will continue to follow. peripherally to follow-up on the labs     The Stroke Staff is Dr. Guillory.    Janeth Walton MD  Vascular Neurology Fellow  To page me or covering stroke neurology team member, click here: [email or web address]   Choose \"On Call\" tab at top, then search dropdown box for \"Neurology Adult\", select location, press Enter, then look for stroke/neuro ICU/telestroke.    _____________________________________________________    Clinically Significant Risk Factors Present on Admission     Past Medical History   Past Medical History:   Diagnosis Date     A-fib (H)      BPH (benign prostatic hyperplasia)      CAD (coronary artery disease)      DMII (diabetes mellitus, type 2) (H)      TIA (transient ischemic attack)      Past Surgical History   Past Surgical History:   Procedure Laterality Date     BACK SURGERY      1990s     TONGUE SURGERY      due to precancerous lesion     Medications   Home Meds  Prior to Admission medications    Medication Sig Start Date End Date Taking? Authorizing Provider   apixaban ANTICOAGULANT (ELIQUIS) 5 MG tablet Take 5 mg by mouth 2 times daily   Yes Unknown, Entered By History   atorvastatin (LIPITOR) 20 MG tablet Take 10 mg by mouth daily   Yes Unknown, Entered By History   lisinopril-hydrochlorothiazide (ZESTORETIC) 20-25 MG tablet Take 1 tablet by mouth daily   Yes Unknown, Entered By History   loratadine (CLARITIN) 10 MG tablet Take 10 mg by mouth daily   Yes Unknown, Entered By History   metFORMIN (GLUCOPHAGE XR) 500 MG 24 " hr tablet Take 500 mg by mouth daily (with breakfast)   Yes Unknown, Entered By History   metoprolol succinate ER (TOPROL XL) 50 MG 24 hr tablet Take 25 mg by mouth daily   Yes Unknown, Entered By History   tamsulosin (FLOMAX) 0.4 MG capsule Take 0.4 mg by mouth daily   Yes Unknown, Entered By History       Scheduled Meds    apixaban ANTICOAGULANT  5 mg Oral BID     atorvastatin  10 mg Oral or Feeding Tube QPM     lisinopril  20 mg Oral Daily    And     hydrochlorothiazide  25 mg Oral Daily     insulin aspart  1-3 Units Subcutaneous TID AC     insulin aspart  1-3 Units Subcutaneous At Bedtime     metoprolol succinate ER  25 mg Oral Daily     sodium chloride (PF)  3 mL Intracatheter Q8H     tamsulosin  0.4 mg Oral Daily       Infusion Meds    - MEDICATION INSTRUCTIONS -       - MEDICATION INSTRUCTIONS -       - MEDICATION INSTRUCTIONS -         PRN Meds  sore throat lozenge, glucose **OR** dextrose **OR** glucagon, lidocaine 4%, lidocaine (buffered or not buffered), - MEDICATION INSTRUCTIONS -, - MEDICATION INSTRUCTIONS -, metoprolol, OLANZapine, - MEDICATION INSTRUCTIONS -, sodium chloride (PF)    Allergies   No Known Allergies  Family History   No family history on file.  Social History   Social History     Tobacco Use     Smoking status: Former Smoker     Years: 25.00     Types: Cigars, Pipe     Quit date:      Years since quittin.6   Substance Use Topics     Alcohol use: Never       Review of Systems   The 5 point Review of Systems is negative other than noted in the HPI or here.        PHYSICAL EXAMINATION   Temp:  [97.6  F (36.4  C)-98.8  F (37.1  C)] 97.6  F (36.4  C)  Pulse:  [65-85] 77  Resp:  [16-20] 16  BP: (133-176)/(69-84) 143/84  SpO2:  [92 %-97 %] 92 %    Neurologic  Mental Status:  alert, oriented x 3, follows commands, speech with mild dysarthria and fluent, naming and repetition normal  Cranial Nerves:  visual fields intact, EOMI with normal smooth pursuit, facial sensation intact and  symmetric, facial movements symmetric, hearing not formally tested but intact to conversation, shoulder shrug strong bilaterally, tongue protrusion midline, mild dysarthria   Motor:  normal muscle tone and bulk, no abnormal movements, able to move all limbs spontaneously, strength 5/5 throughout upper and lower extremities, no pronator drift  Reflexes:  toes down-going  Sensory:  light touch sensation intact and symmetric throughout upper and lower extremities, no extinction on double simultaneous stimulation   Coordination:  decreased JAGDISH in RUE and RLE, ataxia with finger to nose and heel to shin with RUE and RLE. normal on the left  Station/Gait:  deferred    Dysphagia Screen  Per Nursing    Stroke Scales    NIHSS  1a. Level of Consciousness 0-->Alert, keenly responsive   1b. LOC Questions 0-->Answers both questions correctly   1c. LOC Commands 0-->Performs both tasks correctly   2.   Best Gaze 0-->Normal   3.   Visual 0-->No visual loss   4.   Facial Palsy 0-->Normal symmetrical movements   5a. Motor Arm, Left 0-->No drift, limb holds 90 (or 45) degrees for full 10 secs   5b. Motor Arm, Right 0-->No drift, limb holds 90 (or 45) degrees for full 10 secs   6a. Motor Leg, Left 0-->No drift, leg holds 30 degree position for full 5 secs   6b. Motor Leg, right 0-->No drift, leg holds 30 degree position for full 5 secs   7.   Limb Ataxia 2-->Present in two limbs   8.   Sensory 0-->Normal, no sensory loss   9.   Best Language 1-->Mild-to-moderate aphasia, some obvious loss of fluency or facility of comprehension, without significant limitation on ideas expressed or form of expression. Reduction of speech and/or comprehension, however, makes conversation. . . (see row details)   10. Dysarthria 2-->Severe dysarthria, patients speech is so slurred as to be unintelligible in the absence of or out of proportion to any dysphasia, or is mute/anarthric   11. Extinction and Inattention  0-->No abnormality   Total 5 (08/02/22  1626)       Modified [place] Score (Pre-morbid)  1 - No significant disability.  Able to carry out all usual activities, despite some symptoms.    Imaging  I personally reviewed all imaging; relevant findings per HPI.    Labs Data   CBC  Recent Labs   Lab 08/02/22  1301 08/02/22  0747 08/01/22  0939   WBC 7.6 7.1 5.6   RBC 4.15* 4.32* 3.58*   HGB 13.1* 13.2* 11.0*   HCT 38.2* 38.4* 32.9*    155 147*     Basic Metabolic Panel   Recent Labs   Lab 08/03/22  0233 08/02/22  2159 08/02/22  1709 08/02/22  1148 08/02/22  0747 08/01/22  1308 08/01/22  0939   NA  --   --   --   --  138  --  134   POTASSIUM  --   --   --   --  4.2  --  4.2   CHLORIDE  --   --   --   --  108  --  106   CO2  --   --   --   --  25  --  24   BUN  --   --   --   --  17  --  15   CR  --   --   --   --  0.79  --  0.82   * 220* 145*   < > 145*   < > 293*   MAURICIO  --   --   --   --  8.4*  --  7.5*    < > = values in this interval not displayed.     Liver Panel  No results for input(s): PROTTOTAL, ALBUMIN, BILITOTAL, ALKPHOS, AST, ALT, BILIDIRECT in the last 168 hours.  INR    Recent Labs   Lab Test 08/01/22  0939   INR 1.37*      Lipid Profile    Recent Labs   Lab Test 08/02/22  0747 08/01/22  1430   CHOL 94 100   HDL 36* 38*   LDL 40 40   TRIG 91 110     A1C    Recent Labs   Lab Test 08/01/22  1430 08/01/22  0939   A1C 7.1* 7.3*     Troponin I    Recent Labs   Lab 08/01/22  1430 08/01/22  1400 08/01/22  0939   TROPONINIS 90* 78 68          Stroke Consult Data Data   This was a non-emergent, non-telestroke consult.

## 2022-08-03 NOTE — PROGRESS NOTES
08/03/22 1544   General Information   Onset of Illness/Injury or Date of Surgery 08/01/22   Referring Physician Dr. Garcia   Patient/Family Therapy Goal Statement (SLP) Patient did not state.   Pertinent History of Current Problem Acute ischemic stroke of R cerebllum due to cardioembolism vs small vessel disease- had expressive aphasia, waxes and wanes on presentation here is currently on Eliquis for Afib. Given anticoagulation, would consider other possible etiologies such as seizures for presentation. MRI showed  Acute stroke in the R cerebellum that is consistent with dysarthria, right arm and leg ataxia seen on exam, however inconsistent with aphasia. 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   Type of Evaluation   Type of Evaluation Speech, Language, Cognition   Speech   Vocal Loudness (Motor Speech) reduced loudness   Speech Intelligibility (Motor Speech) phrase/sentence level;conversational level   Breath Support (Motor Speech) moderate impairment   Dysarthria differential diagnosis Ataxic/cerebellar   Respiration (motor speech) None   Phonation (motor speech) Variable loudness   Conversational Level, Speech Intelligibility (Motor Speech) moderate impairment  (Moderate to severe)   Phrase/Sentence Level, Speech Intelligibility (Motor Speech) moderate impairment   Western Aphasia Battery- Revised Bedside Record From   Spontaneous Speech Content Score (out of 10) 4   Spontaneous Speech Fluency Score (out of 10) 3   Auditory Verbal Comprehension Score (out of 10) 9   Sequential Commands Score (out of 10) 5   Repetition Score (out of 10) 8   Object Naming Score (out of 10) 9   Bedside Aphasia Sum 38   WAB-R Bedside Aphasia Score 63.33   Aphasia Severity Level Moderate Aphasia   General Therapy Interventions   Planned Therapy Interventions Language;Communication   Language Auditory comprehension;Reading comprehension;Verbal expression   Communication  Improve speech intelligibility   Clinical Impression   Criteria for Skilled Therapeutic Interventions Met (SLP Eval) Yes, treatment indicated   SLP Diagnosis Moderate receptive/expressive aphasia and 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 Patient scored a 66.33 on the WAB-R indicating moderate aphasia in the areas of auditory comprehension to follow sequential commands, verbal expression with evidence of word finding deficits in conversation. 90% for object naming. Moderate to severe dysarthria characterized by precise articulation and prosody.Patient will benefit from skilled intervention to address these deficits.   SLP Discharge Planning   SLP Discharge Recommendation Acute [facility]hab Center-Motivated patient will benefit from intensive, interdisciplinary therapy.  Anticipate will be able to tolerate 3 hours of therapy per day;Transitional Care Facility   SLP Rationale for DC Rec Patient's communication is below his baseline.   SLP Brief overview of current status  Moderate aphasia and moderate to severe dysarthria.    Total Evaluation Time   Total Evaluation Time (Minutes) 20   SLP Goals   Therapy Frequency (SLP Eval) 5 times/wk   SLP Predicted Duration/Target Date for Goal Attainment 08/17/22   SLP Goals Language Comprehension;Communication;SLP Goal 1   SLP: Improve language comprehension for interaction with caregivers/environment minimal assist;auditory and written  (Patient will be able to follow 2 step directions with 90%)   SLP: Communicate basic wants and needs moderate assist;verbally  (Patient will be  able to generate a sentence givn a single word with 90%)   SLP: Goal 1 Patient will improve speech intelligibility at the word, sentence and converastional level with 90%

## 2022-08-03 NOTE — CONSULTS
Urology Consult History and Physical    Name: Nathan Macias    MRN: [id number]   YOB: 1933       We were asked to see Natahn Macias at the request of Dr. Quach for evaluation and treatment of acute urinary retention .          Chief Complaint:   Acute ischemic stroke, acute urinary retention     History is obtained from the patient's wife and the medical record          History of Present Illness:   Nathan Macias is a 89 year old male who is being seen for evaluation of urinary retention in the setting of an acute ischemic stroke.  He presented to the emergency room yesterday with dysarthria, aphasia, and ataxia was noted to have an acute ischemic stroke of the right cerebellum.  He has been admitted for further management.  He has a history of BPH and LUTS and his wife notes that he has been on tamsulosin 0.4 mg daily for at least 1 to 2 years.  [facility]  She does not recall prior episodes of urinary retention though notes he does have ongoing LUTS.  He has been requiring intermittent straight cath and now an indwelling Pollack catheter has been placed by the primary team.           Past Medical History:     Past Medical History:   Diagnosis Date     A-fib (H)      BPH (benign prostatic hyperplasia)      CAD (coronary artery disease)      DMII (diabetes mellitus, type 2) (H)      TIA (transient ischemic attack)             Past Surgical History:     Past Surgical History:   Procedure Laterality Date     BACK SURGERY      1990s     TONGUE SURGERY      due to precancerous lesion            Social History:     Social History     Tobacco Use     Smoking status: Former Smoker     Years: 25.00     Types: Cigars, Pipe     Quit date:      Years since quittin.6     Smokeless tobacco: Not on file   Substance Use Topics     Alcohol use: Never            Family History:   No family history on file.           Allergies:   No Known Allergies          Medications:     Current Facility-Administered Medications   Medication     apixaban ANTICOAGULANT (ELIQUIS) tablet 5 mg     atorvastatin (LIPITOR) tablet 10 mg     benzocaine-menthol (CHLORASEPTIC) 6-10 MG lozenge 1 lozenge     glucose gel 15-30 g    Or     dextrose 50 % injection 25-50 mL    Or     glucagon injection 1 mg     guaiFENesin (ROBITUSSIN) 20 mg/mL solution 10 mL     lisinopril (ZESTRIL) tablet 20 mg    And     hydrochlorothiazide (HYDRODIURIL) tablet 25 mg     insulin aspart (NovoLOG) injection (RAPID ACTING)     insulin aspart (NovoLOG) injection (RAPID ACTING)     lidocaine (LMX4) cream     lidocaine 1 % 0.1-1 mL     medication instruction - No oral meds if patient didn't pass dysphagia screen     Medication Instructions - Avoid dextrose in IV solutions.     metoprolol (LOPRESSOR) injection 2.5 mg     metoprolol succinate ER (TOPROL XL) 24 hr tablet 25 mg     OLANZapine (zyPREXA) injection 2.5 mg     Patient is already receiving anticoagulation with heparin, enoxaparin (LOVENOX), warfarin (COUMADIN)  or other anticoagulant medication     sodium chloride (PF) 0.9% PF flush 3 mL     sodium chloride (PF) 0.9% PF flush 3 mL     tamsulosin (FLOMAX) capsule 0.4 mg             Review of Systems:    ROS: 10 point ROS neg other than the symptoms noted above in the HPI.          Physical Exam:     Patient Vitals for the past 24 hrs:   BP Temp Temp src Pulse Resp SpO2   08/03/22 1158 126/68 98  F (36.7  C) Oral 79 16 94 %   08/03/22 0715 (!) 176/106 98.1  F (36.7  C) Axillary 87 16 96 %   08/03/22 0404 (!) 143/84 97.6  F (36.4  C) Oral 77 16 92 %   08/02/22 2337 (!) 155/84 98  F (36.7  C) Oral 81 16 94 %   08/02/22 1900 137/72 98.3  F (36.8  C) Oral 79 18 97 %   08/02/22 1527 137/69 98.1  F (36.7  C) Oral 78 20 96 %     General: age-appropriate appearing male in NAD  Lungs: no respiratory distress, or pursed lip breathing  Heart: No obvious jugular venous distension present  Back: no bony midline tenderness, no  CVAT bilaterally.  Abdomen: non-distended  : Pollack catheter in place draining clear urine  LE: no edema.   Musculoskeltal: extremities normal, no peripheral edema  Skin: no suspicious lesions or rashes          Data:   All laboratory data reviewed:    Recent Labs   Lab 08/03/22  0822 08/02/22  1301 08/02/22  0747 08/01/22  0939   WBC 7.6 7.6 7.1 5.6   HGB 14.3 13.1* 13.2* 11.0*    157 155 147*     Recent Labs   Lab 08/03/22  1204 08/03/22  0832 08/03/22  0822 08/03/22  0233 08/02/22  1148 08/02/22  0747 08/01/22  1308 08/01/22  0939   NA  --   --  139  --   --  138  --  134   POTASSIUM  --   --  4.0  --   --  4.2  --  4.2   CHLORIDE  --   --  105  --   --  108  --  106   CO2  --   --  28  --   --  25  --  24   BUN  --   --  22  --   --  17  --  15   CR  --   --  0.97  --   --  0.79  --  0.82   * 156* 166* 161*   < > 145*   < > 293*   MAURICIO  --   --  8.9  --   --  8.4*  --  7.5*    < > = values in this interval not displayed.     Recent Labs   Lab 08/03/22  1134   COLOR Light Yellow   APPEARANCE Clear   URINEGLC Negative   URINEBILI Negative   URINEKETONE Negative   SG 1.024   URINEPH 7.0   PROTEIN 30 *   NITRITE Negative   LEUKEST Small*   RBCU 176*   WBCU 37*     IMAGING:  All pertinent imaging reviewed:    All imaging studies reviewed by me.  I personally reviewed these imaging films.  A formal report from radiology will follow.    CT ABD/PEL 8/2/2022:  FINDINGS:   LUNGS AND PLEURA: Mild scattered atelectasis and/or fibrosis. Mild  bronchial wall thickening and bronchiectasis. No consolidation or  effusion. Tiny dense nodule in the right anterolaterally image 131  series 3 likely a tiny benign granuloma. Ultimately this is  incompletely characterized.     MEDIASTINUM/AXILLAE: Tiny hiatal hernia. No adenopathy or aneurysm.     CORONARY ARTERY CALCIFICATIONS: Moderate.     HEPATOBILIARY: No significant mass or bile duct dilatation. No  calcified gallstones.      PANCREAS: No significant mass, duct  dilatation, or inflammatory  change.     SPLEEN: Normal size.     ADRENAL GLANDS: No significant nodules.     KIDNEYS/BLADDER: No significant mass, stones, or hydronephrosis.     BOWEL: No obstruction or inflammatory change.     PELVIC ORGANS: No pelvic masses. Prostatomegaly measuring 6.1 x 5.7 x  5.4 cm.     ADDITIONAL FINDINGS: A few mildly enlarged [place] hepatis lymph nodes  are noted, one measuring 1.5 cm in short axis. These are nonspecific.     MUSCULOSKELETAL: No frankly destructive bony lesions.                                                                      IMPRESSION:  1.  Mild nonspecific adenopathy in the [place] hepatis. Mildly enlarged  lymph node here are somewhat common.  2.  Prostatomegaly measuring 6.1 x 5.7 x 5.4 cm.  3.  Tiny hiatal hernia.  4.  3 mm nodule in the right lung.           Impression and Plan:   Impression:   89-year-old man admitted with an acute ischemic stroke with history of BPH and LUTS now with urinary retention      Plan:   Urinary retention  - On discussion with his wife he has been on treatment for BPH and LUTS for least 1 to 2 years with tamsulosin 0.4 mg daily  - Recommend continuation of this medication  - Pending his clinical course he may have a trial of void prior to discharge in the next few days, otherwise he may be discharged with Pollack catheter and have outpatient follow-up either in our office or with his [facility] providers for trial of void  - I reviewed his CT scan and reviewed these images personally.  I agree with radiologist interpretation with prostatomegaly with no intravesical median lobe  - No indication for acute urologic intervention  - Urology will sign off, please call with questions     Jerrod William MD   Urology  [facility]  Clinic Phone [phone]

## 2022-08-03 NOTE — PROGRESS NOTES
Patient is A/O x 3 with forgetfulness and occasional confusion, up to bathroom with A2 GB and walker, falls risk, patient is impulsive, attendant at bedside, feliz in place with good output, urine is blood tinged irritation, neuros are intact except severe slurred/garbled speech, discharge to ARU pending.

## 2022-08-03 NOTE — PROVIDER NOTIFICATION
MD Notification    Notified Person: MD    Notified Person Name: Jeniffer Larson    Notification Date/Time:8/2 2257    Notification Interaction:web page    Purpose of Notification:705 WR - pt unable to void,  ml. Can he have straight cath orders? THanks.      Orders Received:Orders placed    Comments:

## 2022-08-03 NOTE — PROGRESS NOTES
[facility]    Hospitalist Progress Note    Assessment & Plan   .Nathan Macias is a 89 year old male with a history of afib on Eliquis who was brought to the ED with dysarthria, aphasia and ataxia and noted to have an acute ischemic stroke of the RIGHT cerebellum. He is admitted for further evaluation and management.      Acute ischemic stroke of RIGHT cerebellum  Likely due to cardioembolism vs small vessel disease vs other  No malignancy on CT c/a/p but shows prostatomegaly.  TTE with normal EF, indeterminate lest diastolic function and neg bubble.  expressive aphasia (persistent)  - Continue PTA statin.  - Cont PTA BP meds.  - Cont eliquis.  - neurochecks q4H   - PT/OT/SLP.  - Follow neuro recs.    Atrial fibrillation  - Resumed PTA BB and apixaban.     DMII. A1C is 7.1.  - hold metformin  - sliding scale insulin     Hypertension  - Resumed PTA meds.  - Prn hydralazine.     Cough  Sore throat:  No infection seen on CT chest.Neg for strep AG.  - Cepacol. Prn.  - Prn antitussives.  - IS.    Confusion:  Probable underlyng cognitive issues.  Wife reports that the patient has been having issues with short-term memories for about a year (forgetting keys and groceries etc).  - UA.  - Continue 1:1.  - Delirium bundle.   - Feliz as below for retention.  - Formal cognitive testing as outpatient.    Prostatomegaly on CT (unsure if new but was on flomax PTA).  Urinary retention causing frequent cath.  - Indewelling feliz.  - Continue flomax.  - Bladder scan given confusion.  - Urology consult.  -   Pulmonary nodule: 3mm, accidental.  - Repeat CT in 12 months.     DVT Prophylaxis: apixaban.  Code Status: Full Code     Disposition: when ok with neurology.    Melecio Quach MD, MD      Interval History   No significant improvement of aphasia.. C/o sore throat.coughing. no sob. Denies cp/sob. Denies focal weakness/numbness.        Physical Exam   Temp: 97.6  F (36.4  C) Temp src: Oral BP: (!)  143/84 Pulse: 77   Resp: 16 SpO2: 92 % O2 Device: None (Room air)    Vitals:    08/01/22 0944   Weight: 84 kg (185 lb 3 oz)     Vital Signs with Ranges  Temp:  [97.6  F (36.4  C)-98.8  F (37.1  C)] 97.6  F (36.4  C)  Pulse:  [65-85] 77  Resp:  [16-20] 16  BP: (133-176)/(69-84) 143/84  SpO2:  [92 %-97 %] 92 %  I/O last 3 completed shifts:  In: -   Out: 1500 [Urine:1500]    Respiratory: Clear to auscultation bilaterally, no crackles or wheezing  Cardiovascular: Regular rate and rhythm, normal S1 and S2, and no murmur noted  GI: Normal bowel sounds, soft, non-distended, non-tender  Skin/Integumen: No rashes, no cyanosis, no edema  Other:     Medications     - MEDICATION INSTRUCTIONS -       - MEDICATION INSTRUCTIONS -       - MEDICATION INSTRUCTIONS -         apixaban ANTICOAGULANT  5 mg Oral BID     atorvastatin  10 mg Oral or Feeding Tube QPM     lisinopril  20 mg Oral Daily    And     hydrochlorothiazide  25 mg Oral Daily     insulin aspart  1-3 Units Subcutaneous TID AC     insulin aspart  1-3 Units Subcutaneous At Bedtime     metoprolol succinate ER  25 mg Oral Daily     sodium chloride (PF)  3 mL Intracatheter Q8H     tamsulosin  0.4 mg Oral Daily       Data   Recent Labs   Lab 08/03/22  0233 08/02/22  2159 08/02/22  1709 08/02/22  1301 08/02/22  1148 08/02/22  0747 08/01/22  1308 08/01/22  0939   WBC  --   --   --  7.6  --  7.1  --  5.6   HGB  --   --   --  13.1*  --  13.2*  --  11.0*   MCV  --   --   --  92  --  89  --  92   PLT  --   --   --  157  --  155  --  147*   INR  --   --   --   --   --   --   --  1.37*   NA  --   --   --   --   --  138  --  134   POTASSIUM  --   --   --   --   --  4.2  --  4.2   CHLORIDE  --   --   --   --   --  108  --  106   CO2  --   --   --   --   --  25  --  24   BUN  --   --   --   --   --  17  --  15   CR  --   --   --   --   --  0.79  --  0.82   ANIONGAP  --   --   --   --   --  5  --  4   MAURICIO  --   --   --   --   --  8.4*  --  7.5*   * 220* 145*  --    < > 145*   < >  293*    < > = values in this interval not displayed.       Recent Results (from the past 24 hour(s))   Echocardiogram Complete - For age > 60 yrs   Result Value    LVEF  55%    Narrative    [id number]  [id number]  [id number]^[email or web address]^JAMES^MIHAELA     [facility]  Echocardiography Laboratory  [address] [place] [address]     Name: BREONNA FOSTER  MRN: [id number]  : 1933  Study Date: 2022 10:13 AM  Age: 89 yrs  Gender: Male  Patient Location: [facility]  Reason For Study: Cerebrovascular Incident  Ordering Physician: JAMES BOO  Referring Physician: JAMES BOO  Performed By: Venkat Avina RDCS     BSA: 2.0 m2  Height: 68 in  Weight: 185 lb  HR: 95  BP: 134/49 mmHg  ______________________________________________________________________________  Procedure  Complete Portable Echo Adult. Optison (NDC #9700-1330) given intravenously.  ______________________________________________________________________________  Interpretation Summary     1. The left ventricle is normal in structure, function and size. The visual  ejection fraction is estimated at 55%.  2. The right ventricle is normal in structure, function and size.  3. No valve disease.     No previous echo for comparison.  ______________________________________________________________________________  Left Ventricle  The left ventricle is normal in structure, function and size. There is  borderline concentric left ventricular hypertrophy. The visual ejection  fraction is estimated at 55%. Left ventricular diastolic function is  indeterminate. Normal left ventricular wall motion.     Right Ventricle  The right ventricle is normal in structure, function and size.     Atria  Normal left atrial size. Right atrial size is normal. There is no atrial shunt  seen.     Mitral Valve  There is no mitral regurgitation noted.     Tricuspid Valve  There is mild (1+) tricuspid regurgitation. The right ventricular  systolic  pressure is approximated at 26.2 mmHg plus the right atrial pressure.     Aortic Valve  The aortic valve is normal in structure and function.     Pulmonic Valve  The pulmonic valve is normal in structure and function.     Vessels  Normal ascending, transverse (arch), and descending aorta. The inferior vena  cava was normal in size with preserved respiratory variability.     Pericardium  There is no pericardial effusion.     Rhythm  Rhythm undetermined, wide QRS.  ______________________________________________________________________________  MMode/2D Measurements & Calculations  IVSd: 1.2 cm     LVIDd: 4.9 cm  LVIDs: 4.2 cm  LVPWd: 1.4 cm  FS: 13.6 %  LV mass(C)d: 258.1 grams  LV mass(C)dI: 130.5 grams/m2  LVOT diam: 2.3 cm  LVOT area: 4.1 cm2  LA Volume (BP): 59.2 ml  LA Volume Index (BP): 29.9 ml/m2  RWT: 0.58     Doppler Measurements & Calculations  PA acc time: 0.14 sec     TR max carrol: 255.9 cm/sec  TR max P.2 mmHg     ______________________________________________________________________________  Report approved by: Edwin Jesus 2022 11:36 AM         CT Chest/Abdomen/Pelvis w Contrast    Narrative    CT CHEST/ABDOMEN/PELVIS WITH CONTRAST 2022 2:52 PM    CLINICAL HISTORY: Concern for malignancy.    TECHNIQUE: CT scan of the chest, abdomen, and pelvis was performed  following injection of IV contrast. Multiplanar reformats were  obtained. Dose reduction techniques were used.     CONTRAST: 93 mL Isovue 370    COMPARISON: None.    FINDINGS:   LUNGS AND PLEURA: Mild scattered atelectasis and/or fibrosis. Mild  bronchial wall thickening and bronchiectasis. No consolidation or  effusion. Tiny dense nodule in the right anterolaterally image 131  series 3 likely a tiny benign granuloma. Ultimately this is  incompletely characterized.    MEDIASTINUM/AXILLAE: Tiny hiatal hernia. No adenopathy or aneurysm.    CORONARY ARTERY CALCIFICATIONS: Moderate.    HEPATOBILIARY: No significant mass  or bile duct dilatation. No  calcified gallstones.     PANCREAS: No significant mass, duct dilatation, or inflammatory  change.    SPLEEN: Normal size.    ADRENAL GLANDS: No significant nodules.    KIDNEYS/BLADDER: No significant mass, stones, or hydronephrosis.    BOWEL: No obstruction or inflammatory change.    PELVIC ORGANS: No pelvic masses. Prostatomegaly measuring 6.1 x 5.7 x  5.4 cm.    ADDITIONAL FINDINGS: A few mildly enlarged virginia hepatis lymph nodes  are noted, one measuring 1.5 cm in short axis. These are nonspecific.    MUSCULOSKELETAL: No frankly destructive bony lesions.      Impression    IMPRESSION:  1.  Mild nonspecific adenopathy in the virginia hepatis. Mildly enlarged  lymph node here are somewhat common.  2.  Prostatomegaly measuring 6.1 x 5.7 x 5.4 cm.  3.  Tiny hiatal hernia.  4.  3 mm nodule in the right lung.    Recommendations for one or multiple incidental lung nodules < 6mm :    Low risk patients: No routine follow-up.    High risk patients: Optional follow-up CT at 12 months; if  unchanged, no further follow-up.    *Low Risk: Minimal or absent history of smoking or other known risk  factors.  *Nonsolid (ground glass) or partly solid nodules may require longer  follow-up to exclude indolent adenocarcinoma.  *Recommendations based on Guidelines for the Management of Incidental  Pulmonary Nodules Detected at CT: From the Fleischner Society 2017,  Radiology 2017.  1.    ELAINE ORTEGA MD         SYSTEM ID:  [id number]

## 2022-08-04 ENCOUNTER — APPOINTMENT (OUTPATIENT)
Dept: SPEECH THERAPY | Facility: CLINIC | Age: 87
DRG: 065 | End: 2022-08-04
Payer: COMMERCIAL

## 2022-08-04 ENCOUNTER — APPOINTMENT (OUTPATIENT)
Dept: PHYSICAL THERAPY | Facility: CLINIC | Age: 87
DRG: 065 | End: 2022-08-04
Payer: COMMERCIAL

## 2022-08-04 ENCOUNTER — APPOINTMENT (OUTPATIENT)
Dept: OCCUPATIONAL THERAPY | Facility: CLINIC | Age: 87
DRG: 065 | End: 2022-08-04
Payer: COMMERCIAL

## 2022-08-04 LAB
ANION GAP SERPL CALCULATED.3IONS-SCNC: 5 MMOL/L (ref 3–14)
BUN SERPL-MCNC: 28 MG/DL (ref 7–30)
CALCIUM SERPL-MCNC: 8.2 MG/DL (ref 8.5–10.1)
CHLORIDE BLD-SCNC: 105 MMOL/L (ref 94–109)
CO2 SERPL-SCNC: 27 MMOL/L (ref 20–32)
CREAT SERPL-MCNC: 1.02 MG/DL (ref 0.66–1.25)
ERYTHROCYTE [DISTWIDTH] IN BLOOD BY AUTOMATED COUNT: 13.4 % (ref 10–15)
GFR SERPL CREATININE-BSD FRML MDRD: 70 ML/MIN/1.73M2
GLUCOSE BLD-MCNC: 176 MG/DL (ref 70–99)
GLUCOSE BLDC GLUCOMTR-MCNC: 142 MG/DL (ref 70–99)
GLUCOSE BLDC GLUCOMTR-MCNC: 161 MG/DL (ref 70–99)
GLUCOSE BLDC GLUCOMTR-MCNC: 176 MG/DL (ref 70–99)
GLUCOSE BLDC GLUCOMTR-MCNC: 187 MG/DL (ref 70–99)
HCT VFR BLD AUTO: 37.9 % (ref 40–53)
HGB BLD-MCNC: 12.7 G/DL (ref 13.3–17.7)
MCH RBC QN AUTO: 30.9 PG (ref 26.5–33)
MCHC RBC AUTO-ENTMCNC: 33.5 G/DL (ref 31.5–36.5)
MCV RBC AUTO: 92 FL (ref 78–100)
PLATELET # BLD AUTO: 172 10E3/UL (ref 150–450)
POTASSIUM BLD-SCNC: 4 MMOL/L (ref 3.4–5.3)
RBC # BLD AUTO: 4.11 10E6/UL (ref 4.4–5.9)
SODIUM SERPL-SCNC: 137 MMOL/L (ref 133–144)
WBC # BLD AUTO: 8.7 10E3/UL (ref 4–11)

## 2022-08-04 PROCEDURE — 97112 NEUROMUSCULAR REEDUCATION: CPT | Mod: GO

## 2022-08-04 PROCEDURE — 97530 THERAPEUTIC ACTIVITIES: CPT | Mod: GO

## 2022-08-04 PROCEDURE — 250N000013 HC RX MED GY IP 250 OP 250 PS 637: Performed by: HOSPITALIST

## 2022-08-04 PROCEDURE — 36415 COLL VENOUS BLD VENIPUNCTURE: CPT | Performed by: HOSPITALIST

## 2022-08-04 PROCEDURE — 120N000001 HC R&B MED SURG/OB

## 2022-08-04 PROCEDURE — 92507 TX SP LANG VOICE COMM INDIV: CPT | Mod: GN | Performed by: SPEECH-LANGUAGE PATHOLOGIST

## 2022-08-04 PROCEDURE — 250N000013 HC RX MED GY IP 250 OP 250 PS 637: Performed by: STUDENT IN AN ORGANIZED HEALTH CARE EDUCATION/TRAINING PROGRAM

## 2022-08-04 PROCEDURE — 82310 ASSAY OF CALCIUM: CPT | Performed by: HOSPITALIST

## 2022-08-04 PROCEDURE — 85027 COMPLETE CBC AUTOMATED: CPT | Performed by: HOSPITALIST

## 2022-08-04 PROCEDURE — 97530 THERAPEUTIC ACTIVITIES: CPT | Mod: GP | Performed by: PHYSICAL THERAPIST

## 2022-08-04 PROCEDURE — 99233 SBSQ HOSP IP/OBS HIGH 50: CPT | Performed by: INTERNAL MEDICINE

## 2022-08-04 PROCEDURE — 97116 GAIT TRAINING THERAPY: CPT | Mod: GP | Performed by: PHYSICAL THERAPIST

## 2022-08-04 PROCEDURE — 92526 ORAL FUNCTION THERAPY: CPT | Mod: GN | Performed by: SPEECH-LANGUAGE PATHOLOGIST

## 2022-08-04 RX ORDER — ASPIRIN 81 MG/1
81 TABLET, CHEWABLE ORAL DAILY
Qty: 87 TABLET | Refills: 0 | DISCHARGE
Start: 2022-08-05 | End: 2022-10-31

## 2022-08-04 RX ADMIN — METOPROLOL SUCCINATE 25 MG: 25 TABLET, EXTENDED RELEASE ORAL at 08:28

## 2022-08-04 RX ADMIN — CEFUROXIME AXETIL 250 MG: 250 TABLET ORAL at 20:20

## 2022-08-04 RX ADMIN — APIXABAN 5 MG: 5 TABLET, FILM COATED ORAL at 20:20

## 2022-08-04 RX ADMIN — APIXABAN 5 MG: 5 TABLET, FILM COATED ORAL at 08:28

## 2022-08-04 RX ADMIN — ATORVASTATIN CALCIUM 10 MG: 10 TABLET, FILM COATED ORAL at 20:20

## 2022-08-04 RX ADMIN — INSULIN ASPART 4 UNITS: 100 INJECTION, SOLUTION INTRAVENOUS; SUBCUTANEOUS at 12:51

## 2022-08-04 RX ADMIN — CEFUROXIME AXETIL 250 MG: 250 TABLET ORAL at 08:28

## 2022-08-04 RX ADMIN — LISINOPRIL 20 MG: 20 TABLET ORAL at 08:28

## 2022-08-04 RX ADMIN — INSULIN ASPART 4 UNITS: 100 INJECTION, SOLUTION INTRAVENOUS; SUBCUTANEOUS at 17:51

## 2022-08-04 RX ADMIN — TAMSULOSIN HYDROCHLORIDE 0.4 MG: 0.4 CAPSULE ORAL at 08:28

## 2022-08-04 RX ADMIN — ASPIRIN 81 MG CHEWABLE TABLET 81 MG: 81 TABLET CHEWABLE at 08:28

## 2022-08-04 RX ADMIN — HYDROCHLOROTHIAZIDE 25 MG: 25 TABLET ORAL at 08:28

## 2022-08-04 ASSESSMENT — ACTIVITIES OF DAILY LIVING (ADL)
ADLS_ACUITY_SCORE: 49
ADLS_ACUITY_SCORE: 49
ADLS_ACUITY_SCORE: 45
ADLS_ACUITY_SCORE: 49

## 2022-08-04 NOTE — PROGRESS NOTES
Patient is A/O x 3-4 with forgetfulness, vss, Bp borderline this afternoon, a-febrile, denies pain, up with A2 GB and walker, OT/PT/SLP following, tolerating level 4 thick liquids, video swallow tomorrow morning, Neuros are intact except garbled speech, feliz in place with bloody urine, MD aware, discharge to ARU pending.

## 2022-08-04 NOTE — PROGRESS NOTES
[facility]    Hospitalist Progress Note    Assessment & Plan   Nathan Macias is a 89 year old male with a history of afib on Eliquis who was brought to the ED with dysarthria, aphasia and ataxia and noted to have an acute ischemic stroke of the RIGHT cerebellum. He is admitted for further evaluation and management.      Acute ischemic stroke of RIGHT cerebellum  TTE with normal EF, indeterminate lest diastolic function and neg bubble.  expressive aphasia (persistent)  Not candidate for acute stroke intervention  Etiology of stroke is likely large artery atherosclerosis and R vertebral artery stenosis, artery to artery embolism. No evidence of malignancy on screen.   Exam per neuro; mild dysarthria, decreased JAGDISH in RUE and RLE, ataxia with finger to nose and heel to shin with RUE and RLE. normal on the left    today  Continued dysarthria. Finger nose finger seemed better today    Plan;   - Continue PTA statin. Cont PtA dose as LDL 40   - Cont PTA BP meds.  - Cont eliquis.  - neurochecks q4H   - PT/OT/SLP.  - Follow neuro recs.  -Secondary stroke prevention with apixaban + ASA 81 mg daily x90 days, then just apixaban after that.   Follow up with general neurology in 6-8 weeks   -ARU recommended,  -Long term goals < 130/80, LDL goal <70, A1c goal <7        Atrial fibrillation  - Resumed PTA BB and apixaban.     DMII. A1C is 7.1.   range.   - hold metformin  - sliding scale insulin  Will add prandial aspart 1 unit per 15 gram carbs     Hypertension  Good control  - Resumed PTA meds.  - Prn hydralazine.     Cough  Sore throat:  No infection seen on CT chest.Neg for strep AG.  Afebrile.   - Cepacol. Prn.  - Prn antitussives.  - IS.     Confusion:  Probable underlyng cognitive issues.  Wife reports that the patient has been having issues with short-term memories for about a year (forgetting keys and groceries etc).  Likely PTA cognitive impairment  -some agitation yesterday. Needed  sitter started.   -needed prn zyprex aon 8/2 and 8/3.   Impulsive. Pulled out iv, pulled at catheter.   Today. No agitation, cooperative. Remains impulsive at times.     Plan;   - UA see below. On emperic abx  - Continue 1:1.  - Delirium bundle.   - Feliz as below for retention.  - Formal cognitive testing as outpatient.  -stop restraints     Prostatomegaly on CT (unsure if new but was on flomax PTA).  Urinary retention causing frequent cath.  Hx of tx for bph and LUTS for 2 years with flomax  UA 8/3: 37 wbc, 176 rbc  - Indewelling feliz.  - Continue flomax.  - Bladder scan given confusion.  - Urology consult. Seen 8/3, rec feliz and consider voiding trial in several days. Cont flomax. CT scan reviewed and agree with radiology interpretation . No indication for urgent urologic intervention. Urology signed off  - follow up ucx. Pending  - cont emperic cefuroxime for now  -consider voiding trial in 2-3 days, if discharge with feliz outpatient urology follow up  [facility] urology or at [facility] urology  - pulled at catheter. Some blood in feliz. No clots. RN to flush, follow closely.   On anticoagulation and asa    Pulmonary nodule: 3mm, accidental.  - Repeat CT in 12 months.  -pcp follow up      DVT Prophylaxis: apixaban.  Code Status: Full Code     Disposition: once no longer needs sitter. ARU rec'd    Discussed care plan with pt ,rn, care coordinator    Attempted to reach wife to discuss care plan       Kermit Webster MD, MD  Text Page  (7am to 6pm)  Interval History   Nl vitals, afebrile  No complaints. Impulsive  Needed zyprexa last night  Taking po  Cooperative.   Pulled at feliz overnight. Some blood in feliz but not clots    -Data reviewed today: I reviewed all new labs and imaging results over the last 24 hours. I personally reviewed labs and imaging last 24 hours.     Physical Exam   Temp: 98.3  F (36.8  C) Temp src: Oral BP: 134/71 Pulse: 88   Resp: 18 SpO2: 97 % O2 Device: None (Room air)    Vitals:     08/01/22 0944   Weight: 84 kg (185 lb 3 oz)     Vital Signs with Ranges  Temp:  [98  F (36.7  C)-98.4  F (36.9  C)] 98.3  F (36.8  C)  Pulse:  [71-96] 88  Resp:  [16-18] 18  BP: (119-144)/(63-73) 134/71  SpO2:  [94 %-97 %] 97 %  I/O last 3 completed shifts:  In: 640 [P.O.:640]  Out: 1275 [Urine:1275]    Constitutional: Up sitting in BR  nad  Respiratory: CTAB  Cardiovascular: RRR no r/g/m  GI: soft, nt, dn  ; feliz with bloody urine but no clots     Skin/Integumen: . No rash or edema  Psych: calm, cooperative  Neuro: alert, conversant, dysarthria. Strength seems intact through out. Face symmetric, tongue midline. Finger nose finger might be slightly off, heel chin not assessed. Gait not assessed but unsteady per RN      Medications     - MEDICATION INSTRUCTIONS -       - MEDICATION INSTRUCTIONS -       - MEDICATION INSTRUCTIONS -         apixaban ANTICOAGULANT  5 mg Oral BID     aspirin  81 mg Oral Daily     atorvastatin  10 mg Oral or Feeding Tube QPM     cefuroxime  250 mg Oral Q12H TORI (08/20)     lisinopril  20 mg Oral Daily    And     hydrochlorothiazide  25 mg Oral Daily     insulin aspart  1-3 Units Subcutaneous TID AC     insulin aspart  1-3 Units Subcutaneous At Bedtime     metoprolol succinate ER  25 mg Oral Daily     sodium chloride (PF)  3 mL Intracatheter Q8H     tamsulosin  0.4 mg Oral Daily       Data   Recent Labs   Lab 08/04/22  0833 08/04/22  0826 08/03/22  2222 08/03/22  0832 08/03/22  0822 08/02/22  1709 08/02/22  1301 08/02/22  1148 08/02/22  0747 08/01/22  1308 08/01/22  0939   WBC 8.7  --   --   --  7.6  --  7.6  --  7.1  --  5.6   HGB 12.7*  --   --   --  14.3  --  13.1*  --  13.2*  --  11.0*   MCV 92  --   --   --  93  --  92  --  89  --  92     --   --   --  161  --  157  --  155  --  147*   INR  --   --   --   --   --   --   --   --   --   --  1.37*     --   --   --  139  --   --   --  138  --  134   POTASSIUM 4.0  --   --   --  4.0  --   --   --  4.2  --  4.2   CHLORIDE 105   --   --   --  105  --   --   --  108  --  106   CO2 27  --   --   --  28  --   --   --  25  --  24   BUN 28  --   --   --  22  --   --   --  17  --  15   CR 1.02  --   --   --  0.97  --   --   --  0.79  --  0.82   ANIONGAP 5  --   --   --  6  --   --   --  5  --  4   MAURICIO 8.2*  --   --   --  8.9  --   --   --  8.4*  --  7.5*   * 176* 181*   < > 166*   < >  --    < > 145*   < > 293*    < > = values in this interval not displayed.       Imaging:   Recent Results (from the past 24 hour(s))   XR Chest 2 Views    Narrative    XR CHEST 2 VIEWS 8/3/2022 1:02 PM    HISTORY: cough    COMPARISON: 8/2/2022      Impression    IMPRESSION: Mild linear atelectasis in the right lung base is stable.  No new infiltrate. No pleural effusion or pneumothorax. Normal heart  size.    LIA PARK MD         SYSTEM ID:  [id number]

## 2022-08-05 ENCOUNTER — APPOINTMENT (OUTPATIENT)
Dept: SPEECH THERAPY | Facility: CLINIC | Age: 87
DRG: 065 | End: 2022-08-05
Payer: COMMERCIAL

## 2022-08-05 ENCOUNTER — APPOINTMENT (OUTPATIENT)
Dept: OCCUPATIONAL THERAPY | Facility: CLINIC | Age: 87
DRG: 065 | End: 2022-08-05
Payer: COMMERCIAL

## 2022-08-05 ENCOUNTER — APPOINTMENT (OUTPATIENT)
Dept: PHYSICAL THERAPY | Facility: CLINIC | Age: 87
DRG: 065 | End: 2022-08-05
Payer: COMMERCIAL

## 2022-08-05 ENCOUNTER — APPOINTMENT (OUTPATIENT)
Dept: GENERAL RADIOLOGY | Facility: CLINIC | Age: 87
DRG: 065 | End: 2022-08-05
Attending: INTERNAL MEDICINE
Payer: COMMERCIAL

## 2022-08-05 LAB
BACTERIA UR CULT: NO GROWTH
GLUCOSE BLDC GLUCOMTR-MCNC: 141 MG/DL (ref 70–99)
GLUCOSE BLDC GLUCOMTR-MCNC: 152 MG/DL (ref 70–99)
GLUCOSE BLDC GLUCOMTR-MCNC: 236 MG/DL (ref 70–99)
GLUCOSE BLDC GLUCOMTR-MCNC: 244 MG/DL (ref 70–99)
GLUCOSE BLDC GLUCOMTR-MCNC: 288 MG/DL (ref 70–99)
HGB BLD-MCNC: 13.4 G/DL (ref 13.3–17.7)

## 2022-08-05 PROCEDURE — 97535 SELF CARE MNGMENT TRAINING: CPT | Mod: GO | Performed by: OCCUPATIONAL THERAPIST

## 2022-08-05 PROCEDURE — 120N000001 HC R&B MED SURG/OB

## 2022-08-05 PROCEDURE — 250N000013 HC RX MED GY IP 250 OP 250 PS 637: Performed by: HOSPITALIST

## 2022-08-05 PROCEDURE — 97530 THERAPEUTIC ACTIVITIES: CPT | Mod: GP | Performed by: PHYSICAL THERAPIST

## 2022-08-05 PROCEDURE — 99233 SBSQ HOSP IP/OBS HIGH 50: CPT | Performed by: INTERNAL MEDICINE

## 2022-08-05 PROCEDURE — 97116 GAIT TRAINING THERAPY: CPT | Mod: GP | Performed by: PHYSICAL THERAPIST

## 2022-08-05 PROCEDURE — 250N000013 HC RX MED GY IP 250 OP 250 PS 637: Performed by: INTERNAL MEDICINE

## 2022-08-05 PROCEDURE — 36415 COLL VENOUS BLD VENIPUNCTURE: CPT | Performed by: INTERNAL MEDICINE

## 2022-08-05 PROCEDURE — 92507 TX SP LANG VOICE COMM INDIV: CPT | Mod: GN | Performed by: SPEECH-LANGUAGE PATHOLOGIST

## 2022-08-05 PROCEDURE — 97530 THERAPEUTIC ACTIVITIES: CPT | Mod: GO | Performed by: OCCUPATIONAL THERAPIST

## 2022-08-05 PROCEDURE — 85018 HEMOGLOBIN: CPT | Performed by: INTERNAL MEDICINE

## 2022-08-05 PROCEDURE — 92611 MOTION FLUOROSCOPY/SWALLOW: CPT | Mod: GN | Performed by: SPEECH-LANGUAGE PATHOLOGIST

## 2022-08-05 PROCEDURE — 97112 NEUROMUSCULAR REEDUCATION: CPT | Mod: GP | Performed by: PHYSICAL THERAPIST

## 2022-08-05 PROCEDURE — 74230 X-RAY XM SWLNG FUNCJ C+: CPT

## 2022-08-05 PROCEDURE — 92526 ORAL FUNCTION THERAPY: CPT | Mod: GN | Performed by: SPEECH-LANGUAGE PATHOLOGIST

## 2022-08-05 PROCEDURE — 250N000013 HC RX MED GY IP 250 OP 250 PS 637: Performed by: STUDENT IN AN ORGANIZED HEALTH CARE EDUCATION/TRAINING PROGRAM

## 2022-08-05 RX ORDER — ONDANSETRON 2 MG/ML
4 INJECTION INTRAMUSCULAR; INTRAVENOUS EVERY 6 HOURS PRN
Status: DISCONTINUED | OUTPATIENT
Start: 2022-08-05 | End: 2022-08-30 | Stop reason: HOSPADM

## 2022-08-05 RX ORDER — PROCHLORPERAZINE MALEATE 5 MG
5 TABLET ORAL EVERY 6 HOURS PRN
Status: DISCONTINUED | OUTPATIENT
Start: 2022-08-05 | End: 2022-08-30 | Stop reason: HOSPADM

## 2022-08-05 RX ORDER — ACETAMINOPHEN 500 MG
1000 TABLET ORAL EVERY 6 HOURS PRN
Status: DISCONTINUED | OUTPATIENT
Start: 2022-08-05 | End: 2022-08-30 | Stop reason: HOSPADM

## 2022-08-05 RX ORDER — ONDANSETRON 4 MG/1
4 TABLET, ORALLY DISINTEGRATING ORAL EVERY 6 HOURS PRN
Status: DISCONTINUED | OUTPATIENT
Start: 2022-08-05 | End: 2022-08-30 | Stop reason: HOSPADM

## 2022-08-05 RX ORDER — PROCHLORPERAZINE 25 MG
12.5 SUPPOSITORY, RECTAL RECTAL EVERY 12 HOURS PRN
Status: DISCONTINUED | OUTPATIENT
Start: 2022-08-05 | End: 2022-08-30 | Stop reason: HOSPADM

## 2022-08-05 RX ADMIN — ATORVASTATIN CALCIUM 10 MG: 10 TABLET, FILM COATED ORAL at 20:24

## 2022-08-05 RX ADMIN — APIXABAN 5 MG: 5 TABLET, FILM COATED ORAL at 08:49

## 2022-08-05 RX ADMIN — INSULIN ASPART 4 UNITS: 100 INJECTION, SOLUTION INTRAVENOUS; SUBCUTANEOUS at 13:38

## 2022-08-05 RX ADMIN — CEFUROXIME AXETIL 250 MG: 250 TABLET ORAL at 08:49

## 2022-08-05 RX ADMIN — METOPROLOL SUCCINATE 25 MG: 25 TABLET, EXTENDED RELEASE ORAL at 08:50

## 2022-08-05 RX ADMIN — HYDROCHLOROTHIAZIDE 25 MG: 25 TABLET ORAL at 08:50

## 2022-08-05 RX ADMIN — TAMSULOSIN HYDROCHLORIDE 0.4 MG: 0.4 CAPSULE ORAL at 08:50

## 2022-08-05 RX ADMIN — INSULIN ASPART 2 UNITS: 100 INJECTION, SOLUTION INTRAVENOUS; SUBCUTANEOUS at 08:59

## 2022-08-05 RX ADMIN — Medication 1 MG: at 20:24

## 2022-08-05 RX ADMIN — ACETAMINOPHEN 1000 MG: 500 TABLET, FILM COATED ORAL at 17:28

## 2022-08-05 RX ADMIN — APIXABAN 5 MG: 5 TABLET, FILM COATED ORAL at 20:24

## 2022-08-05 RX ADMIN — CEFUROXIME AXETIL 250 MG: 250 TABLET ORAL at 20:24

## 2022-08-05 RX ADMIN — ASPIRIN 81 MG CHEWABLE TABLET 81 MG: 81 TABLET CHEWABLE at 08:49

## 2022-08-05 RX ADMIN — LISINOPRIL 20 MG: 20 TABLET ORAL at 08:49

## 2022-08-05 RX ADMIN — INSULIN ASPART 4 UNITS: 100 INJECTION, SOLUTION INTRAVENOUS; SUBCUTANEOUS at 17:31

## 2022-08-05 ASSESSMENT — ACTIVITIES OF DAILY LIVING (ADL)
ADLS_ACUITY_SCORE: 49
ADLS_ACUITY_SCORE: 44
ADLS_ACUITY_SCORE: 44
ADLS_ACUITY_SCORE: 50
ADLS_ACUITY_SCORE: 50
ADLS_ACUITY_SCORE: 49
ADLS_ACUITY_SCORE: 45
ADLS_ACUITY_SCORE: 49
ADLS_ACUITY_SCORE: 49

## 2022-08-05 NOTE — PROGRESS NOTES
08/05/22 1353   General Information   Onset of Illness/Injury or Date of Surgery 08/01/22   Referring Physician Dr. Garcia   Patient/Family Therapy Goal Statement (SLP) Would like to take a nap   Pertinent History of Current Problem Acute ischemic stroke of R cerebllum due to cardioembolism vs small vessel disease- had expressive aphasia, waxes and wanes on presentation here is currently on Eliquis for Afib. Given anticoagulation, would consider other possible etiologies such as seizures for presentation. MRI showed  Acute stroke in the R cerebellum that is consistent with dysarthria, right arm and leg ataxia seen on exam, however inconsistent with aphasia. 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   General Observations Pt reported good breakfast of eggs and oneill (?accuracy given pureed diet ordered)   Past History of Dysphagia No hx of dysphagia   Type of Evaluation   Type of Evaluation Swallow Evaluation   General Swallowing Observations   Respiratory Support (General Swallowing Observations) none   Current Diet/Method of Nutritional Intake (General Swallowing Observations, NIS) mildly thick liquids (level 2);pureed (level 4)   Swallowing Evaluation Videofluoroscopic swallow study (VFSS)   VFSS Evaluation   VFSS Textures Trialed thin liquids;mildly thick liquids;moderately thick liquids/liquidized;pureed;minced & moist   VFSS Eval: Thin Liquid Texture Trial   Mode of Presentation, Thin Liquid spoon;cup   Order of Presentation 2, 3   Preparatory Phase Holding;Poor bolus control   Oral Phase, Thin Liquid Delayed AP movement;Effortful AP movement;Residue in oral cavity;Premature pharyngeal entry   Pharyngeal Phase, Thin Liquid Delayed swallow reflex;Residue in valleculae;Residue in pyriform sinus   Rosenbek's Penetration Aspiration Scale: Thin Liquid Trial Results 8 - contrast passes glottis, visible subglottic residue remains, absent patient response  (aspiration)   Response to Aspiration absent response, silent aspiration   Diagnostic Statement Silent aspiration with remaining residue in larynx despite cues for cough; pt asking why he has to cough and denied sensation of aspiration   VFSS Eval: Mildly Thick Liquids   Mode of Presentation straw;spoon;cup   Order of Presentation 1, 4, 5, 6, 9  (?images saved)   Preparatory Phase Holding;Poor bolus control   Oral Phase Effortful AP movement;Premature pharyngeal entry;Delayed AP movement;Residue in oral cavity   Pharyngeal Phase Delayed swallow reflex;Residue in valleculae;Residue in pyriform sinus   Rosenbek's Penetration Aspiration Scale 5 - contrast contacts vocal cords, visible residue remains (penetration)   Successful Strategies Trialed During Procedure   (unable to follow for chin tuck)   Diagnostic Statement Spillage and mild penetration initially; deep penetration on subsequent swallows and residue in laryngeal vestibule; unable to determine if residue/aspiration was from thin liquids or residue that remained in laryngeal vestibule; pt unable to follow for chin tuck and cough not effective   VFSS Eval: Moderately Thick Liquids   Preparatory Phase Poor bolus control   Oral Phase Delayed AP movement;Effortful AP movement;Premature pharyngeal entry   Pharyngeal Phase Delayed swallow reflex;Residue in valleculae;Residue in pyriform sinus   Rosenbek's Penetration Aspiration Scale 3 - contrast remains above the vocal cords, visible residue remains (penetration)   Diagnostic Statement unable to determine if residue in laryngeal vestibule is from mildly thick liquids; appears to have penetration around epiglottis; mild to moderate pharyngeal residue   VFSS Evaluation: Puree Solid Texture Trial   Mode of Presentation, Puree spoon;fed by clinician   Preparatory Phase Poor bolus control   Oral Phase, Puree Premature pharyngeal entry;Delayed AP movement;Effortful AP movement   Pharyngeal Phase, Puree Delayed swallow  reflex;Residue in valleculae;Residue in pyriform sinus   Rosenbek's Penetration Aspiration Scale: Puree Food Trial Results 1 - no aspiration, contrast does not enter airway   Diagnostic Statement Difficult to visualize, but didn't appear to have new penetration; mild to moderate residue   VFSS Eval: Minced & Moist    Preparatory Phase Poor bolus control   Oral Phase Delayed AP movement;Effortful AP movement;Premature pharyngeal entry;Residue in oral cavity   Pharyngeal Phase Delayed swallow reflex;Residue in valleculae;Residue in pyriform sinus   Diagnostic Statement Increased oral time and transit; mild residue after; impacted by fatigue   Swallowing Recommendations   Diet Consistency Recommendations minced & moist (level 5);moderately thick liquids/liquidized (level 3)   Supervision Level for Intake 1:1 supervision needed   Mode of Delivery Recommendations bolus size, small;food moistened   Swallowing Maneuver Recommendations alternate food and liquid intake;double dry swallow;effortful (hard) swallow;supraglottic swallow   Monitoring/Assistance Required (Eating/Swallowing) check mouth frequently for oral residue/pocketing;cue for finger/lingual sweep if oral pocketing present;stop eating activities when fatigue is present   Recommended Feeding/Eating Techniques (Swallow Eval) maintain upright sitting position for eating;maintain upright posture during/after eating for 30 minutes;set-up and prepare tray   Medication Administration Recommendations, Swallowing (SLP) crushed in luna   Clinical Impression   Criteria for Skilled Therapeutic Interventions Met (SLP Eval) Yes, treatment indicated   SLP Diagnosis Moderate dysphagia   Risks & Benefits of therapy have been explained evaluation/treatment results reviewed;risks/benefits reviewed;care plan/treatment goals reviewed;current/potential barriers reviewed;patient   Clinical Impression Comments Pt presents with moderate oropharyngeal dysphagia on Video Swallow Study.  Silent aspiration noted with thin liquids, along with deep penetration and remaining residue with mildly thick liquids. Deficits in oral awareness, timing/coordination and control, reduced tongue base retraction, epiglottic inversion and pharyngeal constriction. Fatigue likely impacting function. Pt currently unable to follow commands for use of swallow strategies.   SLP Discharge Planning   SLP Discharge Recommendation Acute Rehab Center-Motivated patient will benefit from intensive, interdisciplinary therapy.  Anticipate will be able to tolerate 3 hours of therapy per day;Transitional Care Facility   SLP Rationale for DC Rec patient's swallow and communication are below his baseline.   SLP Brief overview of current status  VFSS completed: IDDSI level 5 (minced and moist) with moderately thick liquids; cues for strong swallow, dry swallows as needed, alternating solids and liquids, check mouth after PO intake    Total Evaluation Time   Total Evaluation Time (Minutes) 30   SLP Goals   Therapy Frequency (SLP Eval) daily

## 2022-08-05 NOTE — PROGRESS NOTES
[facility]    Hospitalist Progress Note    Assessment & Plan   Nathan Macias is a 89 year old male with a history of afib on Eliquis who was brought to the ED with dysarthria, aphasia and ataxia and noted to have an acute ischemic stroke of the RIGHT cerebellum. He is admitted for further evaluation and management.      Acute ischemic stroke of RIGHT cerebellum  -TTE with normal EF, indeterminate lest diastolic function and neg bubble.  expressive aphasia (persistent)  Not candidate for acute stroke intervention  -Etiology of stroke is likely large artery atherosclerosis and R vertebral artery stenosis, artery to artery embolism. No evidence of malignancy on screen.   -Exam per neuro; mild dysarthria, decreased JAGDISH in RUE and RLE, ataxia with finger to nose and heel to shin with RUE and RLE. normal on the left    -Continued dysarthria.   Strength intact. Taking po.   -not need zyprexa overnight.     Plan;   - Continue PTA statin. Cont PtA dose as LDL 40   - Cont PTA BP meds.  - Cont eliquis.  - neurochecks q4H   - PT/OT/SLP.  - Follow neuro recs.  -Secondary stroke prevention with apixaban + ASA 81 mg daily x90 days, then just apixaban after that.   Follow up with general neurology in 6-8 weeks   -ARU recommended,  -Long term goals < 130/80, LDL goal <70, A1c goal <7        Atrial fibrillation  - Resumed PTA BB and apixaban.     DMII. A1C is 7.1.  -200 range today range.   - hold metformin  - sliding scale insulin  -added prandial aspart 1 unit per 15 gram carbs     Hypertension  Good control  - Resumed PTA meds.  - Prn hydralazine.     Cough  Sore throat:  No infection seen on CT chest.Neg for strep AG.  Afebrile.   - Cepacol. Prn.  - Prn antitussives.  - IS.     Confusion:  Probable underlyng cognitive issues.  Wife reports that the patient has been having issues with short-term memories for about a year (forgetting keys and groceries etc).  Likely PTA cognitive  impairment  -some agitation several days ago. Needed sitter started.   -needed prn zyprex aon 8/2 and 8/3.   Impulsive. Pulled out iv, pulled at catheter.   8/4 No agitation, cooperative. Remains impulsive at times.   8/5  ucx ngtd  Cefuroxime for possible uti  Not need zyprexa  freq move from bed to chair overnight    Plan;   -add qpm meletonin  - UA see below. On emperic abx  - Continue 1:1.  - Delirium bundle.   - Feliz as below for retention.  - Formal cognitive testing as outpatient.  -stop restraints     Prostatomegaly on CT (unsure if new but was on flomax PTA).  Urinary retention causing frequent cath.  Hx of tx for bph and LUTS for 2 years with flomax  UA 8/3: 37 wbc, 176 rbc  - Indewelling feliz.  - Continue flomax.  - Bladder scan given confusion.  - Urology consult. Seen 8/3, rec feliz and consider voiding trial in several days. Cont flomax. CT scan reviewed and agree with radiology interpretation . No indication for urgent urologic intervention. Urology signed off  - follow up ucx. Pending  - cont emperic cefuroxime for now  -consider voiding trial in 2-3 days, if discharge with feliz outpatient urology follow up  [facility] urology or at [facility] urology  - pulled at catheter. Some blood in feliz. No clots. RN to flush, follow closely.   On anticoagulation and asa  -8/5, still with bloody urine, ucx ngtd. Will have urology see pt again to see if irigation needed. No clear obvious penile trauma on exam.   -check Hb---> hb nl at 13.4  -seen by urology. Flush feliz as needed. CBI not needed. Consider voiding trial before discharge.  -needs repeat UA after feliz removal to ensure no further hematuria, if persistent needs reeval with urology for cystoscopy.     Pulmonary nodule: 3mm, accidental.  - Repeat CT in 12 months.  -pcp follow up      DVT Prophylaxis: apixaban.  Code Status: Full Code     Disposition: once no longer needs sitter. ARU rec'd             Kermit Webster MD, MD  Text Page  (7am to  6pm)  Interval History   Not need zyprexa  Up in room chair to bed freq last night  Still with significantly  bloody urine, no clots  No complaints    -Data reviewed today: I reviewed all new labs and imaging results over the last 24 hours. I personally reviewed labs and imaging last 24 hours.     Physical Exam   Temp: 98.9  F (37.2  C) Temp src: Oral BP: 118/61 Pulse: 100   Resp: 16 SpO2: 95 % O2 Device: None (Room air)    Vitals:    08/01/22 0944   Weight: 84 kg (185 lb 3 oz)     Vital Signs with Ranges  Temp:  [97.8  F (36.6  C)-98.9  F (37.2  C)] 98.9  F (37.2  C)  Pulse:  [] 100  Resp:  [16] 16  BP: ()/(44-68) 118/61  SpO2:  [94 %-96 %] 95 %  I/O last 3 completed shifts:  In: -   Out: 700 [Urine:700]    Constitutional: Up sitting in BR  nad  Respiratory: CTAB  Cardiovascular: RRR no r/g/m  GI: soft, nt, dn  ; feliz with bloody urine but no clots   Skin/Integumen: . No rash or edema  Psych: calm, cooperative  Neuro: alert, conversant, dysarthria. Strength seems intact through out. Face symmetric, tongue midline.     Medications     - MEDICATION INSTRUCTIONS -       - MEDICATION INSTRUCTIONS -       - MEDICATION INSTRUCTIONS -         apixaban ANTICOAGULANT  5 mg Oral BID     aspirin  81 mg Oral Daily     atorvastatin  10 mg Oral or Feeding Tube QPM     cefuroxime  250 mg Oral Q12H [facility] (08/20)     lisinopril  20 mg Oral Daily    And     hydrochlorothiazide  25 mg Oral Daily     insulin aspart   Subcutaneous TID w/meals     insulin aspart  1-3 Units Subcutaneous TID AC     insulin aspart  1-3 Units Subcutaneous At Bedtime     melatonin  1 mg Oral QPM     metoprolol succinate ER  25 mg Oral Daily     sodium chloride (PF)  3 mL Intracatheter Q8H     tamsulosin  0.4 mg Oral Daily       Data   Recent Labs   Lab 08/05/22  1155 08/05/22  0849 08/05/22  0307 08/04/22  1232 08/04/22  0833 08/03/22  0832 08/03/22  0822 08/02/22  1709 08/02/22  1301 08/02/22  1148 08/02/22  0747 08/01/22  1308 08/01/22  0939    WBC  --   --   --   --  8.7  --  7.6  --  7.6  --  7.1  --  5.6   HGB  --   --   --   --  12.7*  --  14.3  --  13.1*  --  13.2*  --  11.0*   MCV  --   --   --   --  92  --  93  --  92  --  89  --  92   PLT  --   --   --   --  172  --  161  --  157  --  155  --  147*   INR  --   --   --   --   --   --   --   --   --   --   --   --  1.37*   NA  --   --   --   --  137  --  139  --   --   --  138  --  134   POTASSIUM  --   --   --   --  4.0  --  4.0  --   --   --  4.2  --  4.2   CHLORIDE  --   --   --   --  105  --  105  --   --   --  108  --  106   CO2  --   --   --   --  27  --  28  --   --   --  25  --  24   BUN  --   --   --   --  28  --  22  --   --   --  17  --  15   CR  --   --   --   --  1.02  --  0.97  --   --   --  0.79  --  0.82   ANIONGAP  --   --   --   --  5  --  6  --   --   --  5  --  4   MAURICIO  --   --   --   --  8.2*  --  8.9  --   --   --  8.4*  --  7.5*   * 236* 141*   < > 176*   < > 166*   < >  --    < > 145*   < > 293*    < > = values in this interval not displayed.       Imaging:   Recent Results (from the past 24 hour(s))   XR Video Swallow with SLP or OT    Narrative    VIDEO SWALLOWING EVALUATION   8/5/2022 11:29 AM     HISTORY: Right CVA.    COMPARISON: None.    FLUOROSCOPY TIME: 1.1 minutes.  SPOT IMAGES OR CINE RUNS: 8    FINDINGS:    Thin: Aspiration.    Mildly thick: Penetration.    Moderately thick: No penetration or aspiration.    Pudding: No penetration or aspiration.    Solid/semisolid: No penetration or aspiration.    Refer to speech pathology report for additional details.     MORRO CORREA MD         SYSTEM ID:  [id number]

## 2022-08-05 NOTE — PROGRESS NOTES
"Urology Note    HPI:  Please see initial consult noted from Dr. William on 8/3/22 for full details. Briefly, this is a 89 yo M with history of BPH and LUTS admitted with acute ischemic stroke now with urinary retention currently managed with indwelling feliz. Plan is for TOV pending his clinical course (vs discharge with feliz and TOV as outpatient).    I was was contacted by patient's primary team this afternoon due to concern for new onset hematuria as of yesterday.  This is believed to be secondary to traumatic Feliz manipulation. Bedside nurse reports the same though this event may not have been witnessed.    /61 (BP Location: Right arm)   Pulse 100   Temp 98.9  F (37.2  C) (Oral)   Resp 16   Ht 1.715 m (5' 7.5\")   Wt 84 kg (185 lb 3 oz)   SpO2 95%   BMI 28.58 kg/m    NAD  Non-labored breathing on room air  Abdomen soft, ND, NT  No CVA tenderness  Feliz in place, normal meatus, no penile lesions, feliz is draining thin maroon tinted urine, no clots    UOP (last 24 hrs/last shift)  700/400 mL    Labs:  8/4/22  S Cr (1.02)  Hgb (12.7)    Hgb recheck 8/5/22 - 13.4    Imaging:  CT CAP on 8/2/22 - notable for no concerning findings of the kidneys; Prostatomegaly with prostate measuring 6.1 x 5.7 x 5.4 cm.    Assessment:  89 yo M with acute ischemic stroke cb urinary retention currently managed with feliz now with gross hematuria. He is currently anticoagulated (on ASA and eliquis). Likely secondary to prostatic bleeding from prostatomegaly in combination with anticoagulation and feliz trauma to prostate. HDS, Hgb stable.    Plan:  - No indication for acute urologic intervention  - CBI not indicated at this time. Ok to flush feliz as needed if concerns for clots and/or clot obstruction (ordered placed).  - Continue tamsulosin 0.4 mg daily  - Pending his clinical course he may have a trial of void prior to discharge in the next few days, otherwise he may be discharged with Feliz catheter and have outpatient " follow-up either in our office or with [facility] for [facility] of void. Please page Urology on call if he discharges with feliz and wishes to follow up at [facility] so follow [facility] with [facility] opposed to [facility] can be established.  - For his hematuria, he should have a follow up urinalysis after feliz is removed to rule out persistent hematuria. If persistent, then a gross hematuria work up with urine cytology, CT Urogram, and cystoscopy should be pursued.  - Urology will sign off, please call with questions     Discussed with staff, Dr. Ramirez.    Nelson Leong MD  Urology, PGY-5  (p) [phone]

## 2022-08-06 ENCOUNTER — APPOINTMENT (OUTPATIENT)
Dept: CT IMAGING | Facility: CLINIC | Age: 87
DRG: 065 | End: 2022-08-06
Attending: INTERNAL MEDICINE
Payer: COMMERCIAL

## 2022-08-06 ENCOUNTER — APPOINTMENT (OUTPATIENT)
Dept: SPEECH THERAPY | Facility: CLINIC | Age: 87
DRG: 065 | End: 2022-08-06
Payer: COMMERCIAL

## 2022-08-06 ENCOUNTER — APPOINTMENT (OUTPATIENT)
Dept: OCCUPATIONAL THERAPY | Facility: CLINIC | Age: 87
DRG: 065 | End: 2022-08-06
Payer: COMMERCIAL

## 2022-08-06 LAB
ANION GAP SERPL CALCULATED.3IONS-SCNC: 4 MMOL/L (ref 3–14)
BUN SERPL-MCNC: 59 MG/DL (ref 7–30)
CALCIUM SERPL-MCNC: 8.4 MG/DL (ref 8.5–10.1)
CHLORIDE BLD-SCNC: 103 MMOL/L (ref 94–109)
CO2 SERPL-SCNC: 28 MMOL/L (ref 20–32)
CREAT SERPL-MCNC: 1.44 MG/DL (ref 0.66–1.25)
ERYTHROCYTE [DISTWIDTH] IN BLOOD BY AUTOMATED COUNT: 13.4 % (ref 10–15)
GFR SERPL CREATININE-BSD FRML MDRD: 46 ML/MIN/1.73M2
GLUCOSE BLD-MCNC: 246 MG/DL (ref 70–99)
GLUCOSE BLDC GLUCOMTR-MCNC: 158 MG/DL (ref 70–99)
GLUCOSE BLDC GLUCOMTR-MCNC: 169 MG/DL (ref 70–99)
GLUCOSE BLDC GLUCOMTR-MCNC: 172 MG/DL (ref 70–99)
GLUCOSE BLDC GLUCOMTR-MCNC: 198 MG/DL (ref 70–99)
GLUCOSE BLDC GLUCOMTR-MCNC: 225 MG/DL (ref 70–99)
HCT VFR BLD AUTO: 36.3 % (ref 40–53)
HGB BLD-MCNC: 12.4 G/DL (ref 13.3–17.7)
MCH RBC QN AUTO: 31.3 PG (ref 26.5–33)
MCHC RBC AUTO-ENTMCNC: 34.2 G/DL (ref 31.5–36.5)
MCV RBC AUTO: 92 FL (ref 78–100)
PLATELET # BLD AUTO: 187 10E3/UL (ref 150–450)
POTASSIUM BLD-SCNC: 5 MMOL/L (ref 3.4–5.3)
RBC # BLD AUTO: 3.96 10E6/UL (ref 4.4–5.9)
SODIUM SERPL-SCNC: 135 MMOL/L (ref 133–144)
WBC # BLD AUTO: 9.4 10E3/UL (ref 4–11)

## 2022-08-06 PROCEDURE — 92507 TX SP LANG VOICE COMM INDIV: CPT | Mod: GN | Performed by: SPEECH-LANGUAGE PATHOLOGIST

## 2022-08-06 PROCEDURE — 258N000003 HC RX IP 258 OP 636: Performed by: INTERNAL MEDICINE

## 2022-08-06 PROCEDURE — 250N000013 HC RX MED GY IP 250 OP 250 PS 637: Performed by: HOSPITALIST

## 2022-08-06 PROCEDURE — 85027 COMPLETE CBC AUTOMATED: CPT | Performed by: INTERNAL MEDICINE

## 2022-08-06 PROCEDURE — 99233 SBSQ HOSP IP/OBS HIGH 50: CPT | Performed by: INTERNAL MEDICINE

## 2022-08-06 PROCEDURE — 36415 COLL VENOUS BLD VENIPUNCTURE: CPT | Performed by: INTERNAL MEDICINE

## 2022-08-06 PROCEDURE — 250N000013 HC RX MED GY IP 250 OP 250 PS 637: Performed by: INTERNAL MEDICINE

## 2022-08-06 PROCEDURE — 97112 NEUROMUSCULAR REEDUCATION: CPT | Mod: GO

## 2022-08-06 PROCEDURE — 92526 ORAL FUNCTION THERAPY: CPT | Mod: GN | Performed by: SPEECH-LANGUAGE PATHOLOGIST

## 2022-08-06 PROCEDURE — 250N000013 HC RX MED GY IP 250 OP 250 PS 637: Performed by: STUDENT IN AN ORGANIZED HEALTH CARE EDUCATION/TRAINING PROGRAM

## 2022-08-06 PROCEDURE — 70450 CT HEAD/BRAIN W/O DYE: CPT

## 2022-08-06 PROCEDURE — 80048 BASIC METABOLIC PNL TOTAL CA: CPT | Performed by: INTERNAL MEDICINE

## 2022-08-06 PROCEDURE — 120N000001 HC R&B MED SURG/OB

## 2022-08-06 RX ORDER — OLANZAPINE 10 MG/2ML
2.5 INJECTION, POWDER, FOR SOLUTION INTRAMUSCULAR EVERY 6 HOURS PRN
Status: DISCONTINUED | OUTPATIENT
Start: 2022-08-06 | End: 2022-08-09

## 2022-08-06 RX ORDER — OLANZAPINE 5 MG/1
5 TABLET, ORALLY DISINTEGRATING ORAL AT BEDTIME
Status: DISCONTINUED | OUTPATIENT
Start: 2022-08-07 | End: 2022-08-09

## 2022-08-06 RX ORDER — SODIUM CHLORIDE 9 MG/ML
INJECTION, SOLUTION INTRAVENOUS CONTINUOUS
Status: DISCONTINUED | OUTPATIENT
Start: 2022-08-06 | End: 2022-08-07

## 2022-08-06 RX ADMIN — APIXABAN 5 MG: 5 TABLET, FILM COATED ORAL at 20:55

## 2022-08-06 RX ADMIN — INSULIN ASPART 4 UNITS: 100 INJECTION, SOLUTION INTRAVENOUS; SUBCUTANEOUS at 17:56

## 2022-08-06 RX ADMIN — ATORVASTATIN CALCIUM 10 MG: 10 TABLET, FILM COATED ORAL at 20:56

## 2022-08-06 RX ADMIN — APIXABAN 5 MG: 5 TABLET, FILM COATED ORAL at 09:02

## 2022-08-06 RX ADMIN — HYDROCHLOROTHIAZIDE 25 MG: 25 TABLET ORAL at 09:02

## 2022-08-06 RX ADMIN — SODIUM CHLORIDE: 9 INJECTION, SOLUTION INTRAVENOUS at 17:25

## 2022-08-06 RX ADMIN — ASPIRIN 81 MG CHEWABLE TABLET 81 MG: 81 TABLET CHEWABLE at 09:02

## 2022-08-06 RX ADMIN — LISINOPRIL 20 MG: 20 TABLET ORAL at 09:02

## 2022-08-06 RX ADMIN — INSULIN ASPART 4 UNITS: 100 INJECTION, SOLUTION INTRAVENOUS; SUBCUTANEOUS at 13:02

## 2022-08-06 RX ADMIN — INSULIN ASPART 3 UNITS: 100 INJECTION, SOLUTION INTRAVENOUS; SUBCUTANEOUS at 09:04

## 2022-08-06 RX ADMIN — METOPROLOL SUCCINATE 25 MG: 25 TABLET, EXTENDED RELEASE ORAL at 09:02

## 2022-08-06 RX ADMIN — Medication 1 MG: at 20:56

## 2022-08-06 RX ADMIN — TAMSULOSIN HYDROCHLORIDE 0.4 MG: 0.4 CAPSULE ORAL at 09:02

## 2022-08-06 ASSESSMENT — ACTIVITIES OF DAILY LIVING (ADL)
ADLS_ACUITY_SCORE: 47
ADLS_ACUITY_SCORE: 44
ADLS_ACUITY_SCORE: 47
ADLS_ACUITY_SCORE: 44
ADLS_ACUITY_SCORE: 47
ADLS_ACUITY_SCORE: 44
ADLS_ACUITY_SCORE: 47
ADLS_ACUITY_SCORE: 47

## 2022-08-06 NOTE — PROGRESS NOTES
Hospitalist addendum;   Called by RN as pt found down in room.   Pt found alert and without complaints.   Has been doing well. Stable neuro status. Nl vitals  Bloody urine in feliz clearing.   Taking po.   No agitation.   RN states bed alarm turned on before he left room   Pt found down on floor. No evidence of injury.   Alarm did not fire and was found to be off per rn  Currently working.   Pt denies HA, neck or head pain. No other pain  Exam  Alert, nad, laying on floor, appears comfortable  Lungs: cTAB  CV: rRR no r/g/m  Chest: no palpable chest wall tenderness  Abd: soft, nt, nd  gU - feliz with less blood urine  Skin: slight excoriation on mid back  Ortho; no pain with passive rom of major joints of BUE and BLE  No pain with int/ext rotation of hips  No neck pain or tenderness to palpation  No spinal tenderness, T and L spine  Neuro: alert, follows commands, stable dysarthric speech. Strength 5/5 extrem X 4  Finger nose finger slightly off and at his baseline. No change from exam this am.   No concerning prefall behaviors or change in status.     A/p  unwittnessed Mechanical fall 2/2 to unassisted ambulation in setting of known ataxia from recent acute cerebellar stroke  No clear signs of infection   No neuro changes  No clear signs of ortho injury.   Discussed with RN and rn team, charge nurse  Sitter 1:1 initiated  Confirmed that bed alarm functional  Cont current meds.   Post fall RN protocol  RN to call if new pain or change in status.   Cont neuro checks and vitals q4 hours.   Will check Hb to ensure not low  Attempted to updated wife by phone. No answer    Addendum; will check stat head CT as patient may have hit head. On anticoagulation. Pt appears well on follow up exam. Alert. Denies hitting head. States he remembers falling. Hx potentially unreliable. Will check head ct to ensure not traumatic intracranial bleed.   Kermit Webster MD    Addendum; bmp shows elevated bun 50 range (though has had some  hematuria) and cr 1.4. K 5.0.   Will hold lisinopril and hydrochlorothiazide  500cc fluid bolus and start NS fluids. Am bmp.   Head ct pending

## 2022-08-06 NOTE — PROVIDER NOTIFICATION
"Date paged: 08/06/2022    Time paged: 1430    Person paged: Eleanor    Paging system used: Vocera Web Console    Message: \"Notifying you that the patient is becoming increasingly compulsive and confused, had an unwitnessed fall at 1430 that resulted in no apparent physical harm despite being educated multiple times to not attempt ambulating independently. 1:1 sitter requested. No neuro changes aside from increased impulsiveness.\"    "

## 2022-08-06 NOTE — PROGRESS NOTES
[facility]    Hospitalist Progress Note    Assessment & Plan   Nathan Macias is a 89 year old male with a history of afib on Eliquis who was brought to the ED with dysarthria, aphasia and ataxia and noted to have an acute ischemic stroke of the RIGHT cerebellum. He is admitted for further evaluation and management.      Acute ischemic stroke of RIGHT cerebellum  -TTE with normal EF, indeterminate lest diastolic function and neg bubble.  expressive aphasia (persistent)  Not candidate for acute stroke intervention  -Etiology of stroke is likely large artery atherosclerosis and R vertebral artery stenosis, artery to artery embolism. No evidence of malignancy on screen.   -Exam per neuro; mild dysarthria, decreased JAGDISH in RUE and RLE, ataxia with finger to nose and heel to shin with RUE and RLE. normal on the left    -Continued dysarthria and ataxia. Pt feels better. A102 still  Strength intact. Taking po.   -not need zyprexa overnight.   -doing well. Taking po. unsteady    Plan;   - Continue PTA statin. Cont PtA dose as LDL 40   - Cont PTA BP meds.  - Cont eliquis.  - neurochecks q4H   - PT/OT/SLP.  - Follow neuro recs.  -Secondary stroke prevention with apixaban + ASA 81 mg daily x90 days, then just apixaban after that.   Follow up with general neurology in 6-8 weeks   -ARU recommended,  -Long term goals < 130/80, LDL goal <70, A1c goal <7        Atrial fibrillation  - Resumed PTA BB and apixaban.     DMII. A1C is 7.1.  -200 range today range.   - hold metformin  - sliding scale insulin  -added prandial aspart --> increase to 1 unit per 10 gram carbs     Hypertension  Good control  - Resumed PTA meds.  - Prn hydralazine.     Cough  Sore throat:  No infection seen on CT chest.Neg for strep AG.  Afebrile.   - Cepacol. Prn.  - Prn antitussives.  - IS.     Confusion:  Probable underlyng cognitive issues.  Wife reports that the patient has been having issues with short-term memories  for about a year (forgetting keys and groceries etc).  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 Needed sitter started.   -needed prn zyprex aon 8/2 and 8/3.   Impulsive. Pulled out iv, pulled at catheter.   8/4 No agitation, cooperative. Remains impulsive at times.   8/5  ucx ngtd  Cefuroxime for possible uti- stopped given ucx ngtd  Not need zyprexa      Plan;   -add qpm meletonin  - Continue 1:1.  - Delirium bundle.   - Feliz as below for retention.  - Formal cognitive testing as outpatient.  -stop restraints     Prostatomegaly on CT (unsure if new but was on flomax PTA).  Urinary retention causing frequent cath.  Hx of tx for bph and LUTS for 2 years with flomax  UA 8/3: 37 wbc, 176 rbc  - Indewelling feliz.  - Continue flomax.  - Bladder scan given confusion.  - Urology consult. Seen 8/3, rec feliz and consider voiding trial in several days. Cont flomax. CT scan reviewed and agree with radiology interpretation . No indication for urgent urologic intervention. Urology signed off  - follow up ucx. Pending  - cont emperic cefuroxime for now  -consider voiding trial in 2-3 days, if discharge with feliz outpatient urology follow up  [facility] urology or at [facility] urology  - pulled at catheter. Some blood in feliz. No clots. RN to flush, follow closely.   On anticoagulation and asa  -8/5, still with bloody urine, ucx ngtd. Will have urology see pt again to see if irigation needed. No clear obvious penile trauma on exam.   -check Hb---> hb nl at 13.4    Nl Hb. Still with bloody urine. No obstruction clots. rNs are flushing feliz    Plan;   -seen by urology. Flush feliz as needed. CBI not needed. Consider voiding trial before discharge.  -needs repeat UA after feliz removal to ensure no further hematuria, if persistent needs reeval with urology for cystoscopy.     Pulmonary nodule: 3mm, accidental.  - Repeat CT in 12 months.  -pcp follow up      DVT Prophylaxis: apixaban.  Code Status: Full  Code     Disposition: once no longer needs sitter. ARU rec'd    Attempted to reach pt's spouse by phone today    Kermit Webster MD, MD  Text Page  (7am to 6pm)  Interval History   Not need zyprexa  Taking po ambulating  Still with bloody urine. No obstruction or clots    -Data reviewed today: I reviewed all new labs and imaging results over the last 24 hours. I personally reviewed labs and imaging last 24 hours.     Physical Exam   Temp: 97.8  F (36.6  C) Temp src: Oral BP: 105/52 Pulse: 72   Resp: 16 SpO2: 95 % O2 Device: None (Room air)    Vitals:    08/01/22 0944   Weight: 84 kg (185 lb 3 oz)     Vital Signs with Ranges  Temp:  [97.6  F (36.4  C)-98.2  F (36.8  C)] 97.8  F (36.6  C)  Pulse:  [67-77] 72  Resp:  [16-18] 16  BP: (105-138)/(52-70) 105/52  SpO2:  [95 %-97 %] 95 %  I/O last 3 completed shifts:  In: 480 [P.O.:480]  Out: 1000 [Urine:1000]    Constitutional:up in bed  nad  Respiratory: CTAB  Cardiovascular: RRR no r/g/m  GI: soft, nt, dn  ; feliz with bloody urine but no clots   Skin/Integumen: . No rash or edema  Psych: calm, cooperative  Neuro: alert, conversant, dysarthria. Strength seems intact through out. Face symmetric, tongue midline.   Finger nose finger is off.   Oriented to person, place , reason hospital stay, month, not know date or time of day, off by day,     Medications     - MEDICATION INSTRUCTIONS -       - MEDICATION INSTRUCTIONS -       - MEDICATION INSTRUCTIONS -         apixaban ANTICOAGULANT  5 mg Oral BID     aspirin  81 mg Oral Daily     atorvastatin  10 mg Oral or Feeding Tube QPM     lisinopril  20 mg Oral Daily    And     hydrochlorothiazide  25 mg Oral Daily     insulin aspart   Subcutaneous TID w/meals     insulin aspart  1-3 Units Subcutaneous TID AC     insulin aspart  1-3 Units Subcutaneous At Bedtime     melatonin  1 mg Oral QPM     metoprolol succinate ER  25 mg Oral Daily     sodium chloride (PF)  3 mL Intracatheter Q8H     tamsulosin  0.4 mg Oral Daily       Data    Recent Labs   Lab 08/06/22  0824 08/06/22  0224 08/05/22  2124 08/05/22  1627 08/05/22  1356 08/04/22  1232 08/04/22  0833 08/03/22  0832 08/03/22  0822 08/02/22  1709 08/02/22  1301 08/02/22  1148 08/02/22  0747 08/01/22  1308 08/01/22  0939   WBC  --   --   --   --   --   --  8.7  --  7.6  --  7.6  --  7.1  --  5.6   HGB  --   --   --   --  13.4  --  12.7*  --  14.3  --  13.1*  --  13.2*  --  11.0*   MCV  --   --   --   --   --   --  92  --  93  --  92  --  89  --  92   PLT  --   --   --   --   --   --  172  --  161  --  157  --  155  --  147*   INR  --   --   --   --   --   --   --   --   --   --   --   --   --   --  1.37*   NA  --   --   --   --   --   --  137  --  139  --   --   --  138  --  134   POTASSIUM  --   --   --   --   --   --  4.0  --  4.0  --   --   --  4.2  --  4.2   CHLORIDE  --   --   --   --   --   --  105  --  105  --   --   --  108  --  106   CO2  --   --   --   --   --   --  27  --  28  --   --   --  25  --  24   BUN  --   --   --   --   --   --  28  --  22  --   --   --  17  --  15   CR  --   --   --   --   --   --  1.02  --  0.97  --   --   --  0.79  --  0.82   ANIONGAP  --   --   --   --   --   --  5  --  6  --   --   --  5  --  4   MAURICIO  --   --   --   --   --   --  8.2*  --  8.9  --   --   --  8.4*  --  7.5*   * 158* 288*   < >  --    < > 176*   < > 166*   < >  --    < > 145*   < > 293*    < > = values in this interval not displayed.       Imaging:   Recent Results (from the past 24 hour(s))   XR Video Swallow with SLP or OT    Narrative    VIDEO SWALLOWING EVALUATION   8/5/2022 11:29 AM     HISTORY: Right CVA.    COMPARISON: None.    FLUOROSCOPY TIME: 1.1 minutes.  SPOT IMAGES OR CINE RUNS: 8    FINDINGS:    Thin: Aspiration.    Mildly thick: Penetration.    Moderately thick: No penetration or aspiration.    Pudding: No penetration or aspiration.    Solid/semisolid: No penetration or aspiration.    Refer to speech pathology report for additional details.     MORRO CORREA MD          SYSTEM ID:  [id number]

## 2022-08-07 ENCOUNTER — APPOINTMENT (OUTPATIENT)
Dept: SPEECH THERAPY | Facility: CLINIC | Age: 87
DRG: 065 | End: 2022-08-07
Payer: COMMERCIAL

## 2022-08-07 ENCOUNTER — APPOINTMENT (OUTPATIENT)
Dept: PHYSICAL THERAPY | Facility: CLINIC | Age: 87
DRG: 065 | End: 2022-08-07
Payer: COMMERCIAL

## 2022-08-07 LAB
ANION GAP SERPL CALCULATED.3IONS-SCNC: 5 MMOL/L (ref 3–14)
BUN SERPL-MCNC: 42 MG/DL (ref 7–30)
CALCIUM SERPL-MCNC: 8.5 MG/DL (ref 8.5–10.1)
CHLORIDE BLD-SCNC: 105 MMOL/L (ref 94–109)
CO2 SERPL-SCNC: 27 MMOL/L (ref 20–32)
CREAT SERPL-MCNC: 1.11 MG/DL (ref 0.66–1.25)
GFR SERPL CREATININE-BSD FRML MDRD: 63 ML/MIN/1.73M2
GLUCOSE BLD-MCNC: 173 MG/DL (ref 70–99)
GLUCOSE BLDC GLUCOMTR-MCNC: 173 MG/DL (ref 70–99)
GLUCOSE BLDC GLUCOMTR-MCNC: 177 MG/DL (ref 70–99)
GLUCOSE BLDC GLUCOMTR-MCNC: 182 MG/DL (ref 70–99)
GLUCOSE BLDC GLUCOMTR-MCNC: 186 MG/DL (ref 70–99)
POTASSIUM BLD-SCNC: 4.2 MMOL/L (ref 3.4–5.3)
SODIUM SERPL-SCNC: 137 MMOL/L (ref 133–144)

## 2022-08-07 PROCEDURE — 250N000013 HC RX MED GY IP 250 OP 250 PS 637: Performed by: HOSPITALIST

## 2022-08-07 PROCEDURE — 80048 BASIC METABOLIC PNL TOTAL CA: CPT | Performed by: INTERNAL MEDICINE

## 2022-08-07 PROCEDURE — 97530 THERAPEUTIC ACTIVITIES: CPT | Mod: GP | Performed by: PHYSICAL THERAPIST

## 2022-08-07 PROCEDURE — 97116 GAIT TRAINING THERAPY: CPT | Mod: GP | Performed by: PHYSICAL THERAPIST

## 2022-08-07 PROCEDURE — 250N000013 HC RX MED GY IP 250 OP 250 PS 637: Performed by: INTERNAL MEDICINE

## 2022-08-07 PROCEDURE — 36415 COLL VENOUS BLD VENIPUNCTURE: CPT | Performed by: INTERNAL MEDICINE

## 2022-08-07 PROCEDURE — 120N000001 HC R&B MED SURG/OB

## 2022-08-07 PROCEDURE — 250N000013 HC RX MED GY IP 250 OP 250 PS 637: Performed by: STUDENT IN AN ORGANIZED HEALTH CARE EDUCATION/TRAINING PROGRAM

## 2022-08-07 PROCEDURE — 92507 TX SP LANG VOICE COMM INDIV: CPT | Mod: GN | Performed by: SPEECH-LANGUAGE PATHOLOGIST

## 2022-08-07 PROCEDURE — 258N000003 HC RX IP 258 OP 636: Performed by: INTERNAL MEDICINE

## 2022-08-07 PROCEDURE — 99233 SBSQ HOSP IP/OBS HIGH 50: CPT | Performed by: INTERNAL MEDICINE

## 2022-08-07 RX ORDER — SODIUM CHLORIDE 9 MG/ML
INJECTION, SOLUTION INTRAVENOUS CONTINUOUS
Status: DISCONTINUED | OUTPATIENT
Start: 2022-08-07 | End: 2022-08-07

## 2022-08-07 RX ORDER — ACETAMINOPHEN 500 MG
1000 TABLET ORAL EVERY 6 HOURS PRN
DISCHARGE
Start: 2022-08-07 | End: 2022-09-15

## 2022-08-07 RX ORDER — SODIUM CHLORIDE 9 MG/ML
INJECTION, SOLUTION INTRAVENOUS CONTINUOUS
Status: ACTIVE | OUTPATIENT
Start: 2022-08-07 | End: 2022-08-07

## 2022-08-07 RX ADMIN — OLANZAPINE 5 MG: 5 TABLET, ORALLY DISINTEGRATING ORAL at 21:16

## 2022-08-07 RX ADMIN — Medication 1 MG: at 21:16

## 2022-08-07 RX ADMIN — INSULIN ASPART 1 UNITS: 100 INJECTION, SOLUTION INTRAVENOUS; SUBCUTANEOUS at 08:16

## 2022-08-07 RX ADMIN — INSULIN ASPART 7 UNITS: 100 INJECTION, SOLUTION INTRAVENOUS; SUBCUTANEOUS at 18:23

## 2022-08-07 RX ADMIN — TAMSULOSIN HYDROCHLORIDE 0.4 MG: 0.4 CAPSULE ORAL at 08:03

## 2022-08-07 RX ADMIN — METOPROLOL SUCCINATE 25 MG: 25 TABLET, EXTENDED RELEASE ORAL at 08:02

## 2022-08-07 RX ADMIN — SODIUM CHLORIDE: 9 INJECTION, SOLUTION INTRAVENOUS at 12:31

## 2022-08-07 RX ADMIN — ASPIRIN 81 MG CHEWABLE TABLET 81 MG: 81 TABLET CHEWABLE at 08:02

## 2022-08-07 RX ADMIN — APIXABAN 5 MG: 5 TABLET, FILM COATED ORAL at 21:16

## 2022-08-07 RX ADMIN — INSULIN ASPART 4 UNITS: 100 INJECTION, SOLUTION INTRAVENOUS; SUBCUTANEOUS at 12:31

## 2022-08-07 RX ADMIN — APIXABAN 5 MG: 5 TABLET, FILM COATED ORAL at 08:03

## 2022-08-07 RX ADMIN — ATORVASTATIN CALCIUM 10 MG: 10 TABLET, FILM COATED ORAL at 21:16

## 2022-08-07 ASSESSMENT — ACTIVITIES OF DAILY LIVING (ADL)
ADLS_ACUITY_SCORE: 46
ADLS_ACUITY_SCORE: 46
ADLS_ACUITY_SCORE: 47
ADLS_ACUITY_SCORE: 46

## 2022-08-07 NOTE — PROVIDER NOTIFICATION
MD Notification    Notified Person: MD    Notified Person Name: Dr. Webster    Notification Date/Time: 8/7/22 @ 1228    Notification Interaction: Vocera    Purpose of Notification: Fyi notification-orthostatics in chart per MD request. Lying /66 to 112/59 standing. No change in HR.    Orders Received: Continue current POC.    Comments:

## 2022-08-07 NOTE — PROVIDER NOTIFICATION
"Hospitalist Cross Cover  8/6/2022    Notified of agitated delirium, restlessness, impulsivity. Chart reviewed. Several prior notes of cognitive deficits and delirium of multifactorial etiology.    /51 (BP Location: Left arm)   Pulse 80   Temp 98.2  F (36.8  C) (Oral)   Resp 16   Ht 1.715 m (5' 7.5\")   Wt 84 kg (185 lb 3 oz)   SpO2 95%   BMI 28.58 kg/m      Plan: zyprexa ODT 5 mg at night prn agitation, delrium  zyprexa 2.5 mg IM q 6 hours prn  Sitter if needed    Jeniffer Larson MD        "

## 2022-08-07 NOTE — PROGRESS NOTES
[facility]    Hospitalist Progress Note    Assessment & Plan   Nathan Macias is a 89 year old male with a history of afib on Eliquis who was brought to the ED with dysarthria, aphasia and ataxia and noted to have an acute ischemic stroke of the RIGHT cerebellum. He is admitted for further evaluation and management.      Acute ischemic stroke of RIGHT cerebellum  -TTE with normal EF, indeterminate lest diastolic function and neg bubble.  expressive aphasia (persistent)  Not candidate for acute stroke intervention  -Etiology of stroke is likely large artery atherosclerosis and R vertebral artery stenosis, artery to artery embolism. No evidence of malignancy on screen.   -Exam per neuro; mild dysarthria, decreased JAGDISH in RUE and RLE, ataxia with finger to nose and heel to shin with RUE and RLE. normal on the left    -Continued dysarthria and ataxia. Pt feels better. A102 still  Strength intact. Taking po.   -not needing zyprexa overnight.   -sitter since fall on 8/6  - wife thinks speech/dysarthria better  - stable neuro status with dysarthria and ataxia  - head ct post fall stable.     Plan;   - Continue PTA statin. Cont PtA dose as LDL 40   - Cont PTA BP meds.  - Cont eliquis.  - neurochecks q4H   - PT/OT/SLP.  - Follow neuro recs.  -Secondary stroke prevention with apixaban + ASA 81 mg daily x90 days, then just apixaban after that.   Follow up with general neurology in 6-8 weeks   -ARU recommended,  -Long term goals < 130/80, LDL goal <70, A1c goal <7  -reviewed care plan with patient's wife on 8/7  -TCU. Not qualify for ARU, discussed with  8/7    Unwitness fall 8/6  See my addended note from yesterday  No neuro changes. No change in status prefall  Bed alarm not on or not working  Pt is ataxic from stroke  No injury. Hb stable  Bun.cr elevated, give fluid bolus and maint fluids  -Cr better with fluids  -stable neuro checks over night.   No new complaints.   Plan; cont fall  precuatoins  Sitter started  -follow  -nl orthostatics today.     HERNAN.   prerenal  Hb stable.   Held ace inh and hydrochlorothiazide  Given 500cc fluid bolus  maint fluids NS started  Cr 1.0--1.4. bun up to 59.   Today. Cr 1.1, bun 42    Plan; cont to hold ace inh and hydrochlorothiazide, reeval in 1-2 days  Am bmp, cont maint fluids today for several more hours then stop. Taking po.   Probably should stop hydrochlorothiazide for now given risk for dehydration in future    Atrial fibrillation  - Resumed PTA BB and apixaban.     DMII. A1C is 7.1.   range today range.   - hold metformin  - sliding scale insulin  -added prandial aspart --> increased to 1 unit per 10 gram carbs 8/6, adjust as needed.      Hypertension  Good control  - Resumed PTA meds.  - Prn hydralazine.  Holding hydrochlorothiazide and lisinopril given HERNAN.   Resume ace inh tomorrow. May need to hold hydrochlorothiazide given risk for dehydration  Follow bmp   -consider dose reduction of lisinopril  -check orthostatics today.      Cough  Sore throat:  No infection seen on CT chest.Neg for strep AG.  Afebrile.   - Cepacol. Prn.  - Prn antitussives.  - IS.     Confusion:  Probable underlyng cognitive issues.  Wife reports that the patient has been having issues with short-term memories for about a year (forgetting keys and groceries etc).  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 Needed sitter started.   -needed prn zyprex aon 8/2 and 8/3.   Impulsive. Pulled out iv, pulled at catheter.   8/4 No agitation, cooperative. Remains impulsive at times.   8/5  ucx ngtd  Cefuroxime for possible uti- stopped given ucx ngtd  Not need zyprexa      Plan;   -add qpm meletonin  - Continue 1:1.  - Delirium bundle.   - Pollack as below for retention.  - Formal cognitive testing as outpatient.  -stop restraints     Prostatomegaly on CT (unsure if new but was on flomax PTA).  Urinary retention causing frequent cath.  Hx of tx for bph and  LUTS for 2 years with flomax  UA 8/3: 37 wbc, 176 rbc  - Indewelling feliz.  - Continue flomax.  - Bladder scan given confusion.  - Urology consult. Seen 8/3, rec feliz and consider voiding trial in several days. Cont flomax. CT scan reviewed and agree with radiology interpretation . No indication for urgent urologic intervention. Urology signed off  - follow up ucx. Pending  - cont emperic cefuroxime for now  -consider voiding trial in 2-3 days, if discharge with feliz outpatient urology follow up  [facility]rology or at [facility] urology  - pulled at catheter. Some blood in feliz. No clots. RN to flush, follow closely.   On anticoagulation and asa  -8/5, still with bloody urine, ucx ngtd. Will have urology see pt again to see if irigation needed. No clear obvious penile trauma on exam.   -check Hb---> hb nl at 13.4    Nl Hb. Still with bloody urine. No obstruction clots. rNs are flushing feliz q shift  - hb stable.     Today.,hb stable post fall  Urine less bloody. No clots or obstruction.     Plan;   -seen by urology. Flush feliz as needed. CBI not needed. Consider voiding trial before discharge per urology---> will do TOV today, check pvr qshift after removed.     -needs repeat UA after feliz removal to ensure no further hematuria, if persistent needs reeval with urology for cystoscopy/cT urogram. Reviewed with wife. This should be done in 1 week at tcu after any current bleeding has subsided.     Pulmonary nodule: 3mm, accidental.  - Repeat CT in 12 months.  -pcp follow up      DVT Prophylaxis: apixaban.  Code Status: Full Code     Disposition: once no longer needs sitter. ARU rec'd    Attempted to reach pt's spouse by phone today    Kermit Webster MD, MD  Text Page  (7am to 6pm)  Interval History   Fall yesterday, lavern on bmp  Fluid bolus and maint fluids  Cr is down.   No jt complaints or HA  Taking po  Wife at bedside  No cp or sob.   Stable neuro status  Not like the food, ate 25%bkfst.       -Data reviewed  today: I reviewed all new labs and imaging results over the last 24 hours. I personally reviewed labs and imaging last 24 hours.     Physical Exam   Temp: 98.4  F (36.9  C) Temp src: Oral BP: (!) 157/66 Pulse: 86   Resp: 16 SpO2: 95 % O2 Device: None (Room air)    Vitals:    08/01/22 0944   Weight: 84 kg (185 lb 3 oz)     Vital Signs with Ranges  Temp:  [97.3  F (36.3  C)-98.9  F (37.2  C)] 98.9  F (37.2  C)  Pulse:  [] 76  Resp:  [14-16] 16  BP: (112-157)/(51-78) 129/56  SpO2:  [95 %-98 %] 97 %  I/O last 3 completed shifts:  In: 426 [P.O.:420; I.V.:6]  Out: 2200 [Urine:2200]    Constitutional:up in chair  nad  Respiratory: CTAB  Cardiovascular: RRR no r/g/m  GI: soft, nt, dn  ; feliz with dark urine but not appear bloody   Skin/Integumen: . No rash or edema  Psych: calm, cooperative  Neuro: alert, conversant, dysarthria. Strength seems intact through out. Face symmetric,  Finger nose finger is off but stable  Nl strength through out  Ortho: no spinal pain. Neck supple. No pain with passive rom major jts BUE and BLE    Medications     - MEDICATION INSTRUCTIONS -       - MEDICATION INSTRUCTIONS -       - MEDICATION INSTRUCTIONS -       sodium chloride 75 mL/hr at 08/06/22 1725       sodium chloride 0.9%  500 mL Intravenous Once     apixaban ANTICOAGULANT  5 mg Oral BID     aspirin  81 mg Oral Daily     atorvastatin  10 mg Oral or Feeding Tube QPM     [Held by provider] lisinopril  20 mg Oral Daily    And     [Held by provider] hydrochlorothiazide  25 mg Oral Daily     insulin aspart   Subcutaneous TID w/meals     insulin aspart  1-3 Units Subcutaneous TID AC     insulin aspart  1-3 Units Subcutaneous At Bedtime     melatonin  1 mg Oral QPM     metoprolol succinate ER  25 mg Oral Daily     OLANZapine zydis  5 mg Oral At Bedtime     sodium chloride (PF)  3 mL Intracatheter Q8H     tamsulosin  0.4 mg Oral Daily       Data   Recent Labs   Lab 08/07/22  0728 08/07/22 0227 08/06/22 2204 08/06/22  1708  08/06/22  1555 08/05/22  1627 08/05/22  1356 08/04/22  1232 08/04/22  0833 08/03/22  0832 08/03/22  0822 08/01/22  1308 08/01/22  0939   WBC  --   --   --   --  9.4  --   --   --  8.7  --  7.6   < > 5.6   HGB  --   --   --   --  12.4*  --  13.4  --  12.7*  --  14.3   < > 11.0*   MCV  --   --   --   --  92  --   --   --  92  --  93   < > 92   PLT  --   --   --   --  187  --   --   --  172  --  161   < > 147*   INR  --   --   --   --   --   --   --   --   --   --   --   --  1.37*     --   --   --  135  --   --   --  137  --  139   < > 134   POTASSIUM 4.2  --   --   --  5.0  --   --   --  4.0  --  4.0   < > 4.2   CHLORIDE 105  --   --   --  103  --   --   --  105  --  105   < > 106   CO2 27  --   --   --  28  --   --   --  27  --  28   < > 24   BUN 42*  --   --   --  59*  --   --   --  28  --  22   < > 15   CR 1.11  --   --   --  1.44*  --   --   --  1.02  --  0.97   < > 0.82   ANIONGAP 5  --   --   --  4  --   --   --  5  --  6   < > 4   MAURICIO 8.5  --   --   --  8.4*  --   --   --  8.2*  --  8.9   < > 7.5*   * 173* 169*   < > 246*   < >  --    < > 176*   < > 166*   < > 293*    < > = values in this interval not displayed.       Imaging:   Recent Results (from the past 24 hour(s))   CT Head w/o Contrast    Narrative    EXAM: CT HEAD W/O CONTRAST  LOCATION: [facility]  DATE/TIME: 8/6/2022 4:19 PM    INDICATION: Right cerebellar infarct.  COMPARISON: Head MRI 08/01/2022  TECHNIQUE: Routine CT Head without IV contrast. Multiplanar reformats. Dose reduction techniques were used.    FINDINGS:  INTRACRANIAL CONTENTS: No intracranial hemorrhage, extraaxial collection, or mass effect.  Moderately large right cerebellar infarct demonstrating expected evolution. It measures approximately 2.5 x 13.7 mm in transverse by AP dimensions. No significant   mass effect. Encephalomalacia in the left entorhinal region again visualized. Mild presumed chronic small vessel ischemic change. Mild generalized  volume loss.     VISUALIZED ORBITS/SINUSES/MASTOIDS: No intraorbital abnormality. Left maxillary retention cyst. No middle ear or mastoid effusion.    BONES/SOFT TISSUES: No acute abnormality.      Impression    IMPRESSION:  1.  No new abnormality.  2.  Expected evolution of the moderately large right cerebellar infarct. No mass effect.  3.  Left entorhinal encephalomalacia again noted.

## 2022-08-07 NOTE — PROVIDER NOTIFICATION
Notified provider about indwelling feliz catheter discussed removal or continued need.    Did provider choose to remove indwelling feliz catheter? Yes    Provider's feliz indication for keeping indwelling feliz catheter: n/a    Is there an order for indwelling feliz catheter? Yes, provider entering order to remove.    *If there is a plan to keep feliz catheter in place at discharge daily notification with provider is not necessary.

## 2022-08-07 NOTE — PROGRESS NOTES
Care Management Follow Up    Length of Stay (days): 6    Expected Discharge Date: 08/09/2022     Concerns to be Addressed:       Patient plan of care discussed at interdisciplinary rounds: Yes    Anticipated Discharge Disposition: Rehab recommendation has changed from ARU to TCU     Anticipated Discharge Services: Transportation Services  Anticipated Discharge DME:      Patient/family educated on Medicare website which has current facility and service quality ratings:    Education Provided on the Discharge Plan:    Patient/Family in Agreement with the Plan: yes    Referrals Placed by CM/SW:    Private pay costs discussed:     Additional Information:  Call placed to spouse to discuss change in level of rehab.  No answer and no way to leave a message.  Patient's insurance, which is [employer], necessitates working within their network.   Based on patient's mental status writer will recommend to spouse [facility] or [facility].  Both [facility] and [facility] are in the [employer] network.  Am unable to send referrals prior to speaking with wife however have left a message with [facility] asking about potential vacancies early this week.  [facility] admission staff indicate possible vacancy by 8/10.  Patient currently has a 1:1 due to fall risk.  The TCU's will want to see that patient doesn't have a 1:1 for at least 24 hours before accepting.  Plan:  Will try to reach wife again today or tomorrow.       MARY Gallegos

## 2022-08-08 ENCOUNTER — APPOINTMENT (OUTPATIENT)
Dept: OCCUPATIONAL THERAPY | Facility: CLINIC | Age: 87
DRG: 065 | End: 2022-08-08
Payer: COMMERCIAL

## 2022-08-08 ENCOUNTER — APPOINTMENT (OUTPATIENT)
Dept: SPEECH THERAPY | Facility: CLINIC | Age: 87
DRG: 065 | End: 2022-08-08
Payer: COMMERCIAL

## 2022-08-08 ENCOUNTER — APPOINTMENT (OUTPATIENT)
Dept: PHYSICAL THERAPY | Facility: CLINIC | Age: 87
DRG: 065 | End: 2022-08-08
Payer: COMMERCIAL

## 2022-08-08 LAB
ANION GAP SERPL CALCULATED.3IONS-SCNC: 6 MMOL/L (ref 3–14)
BUN SERPL-MCNC: 35 MG/DL (ref 7–30)
CALCIUM SERPL-MCNC: 8.5 MG/DL (ref 8.5–10.1)
CHLORIDE BLD-SCNC: 107 MMOL/L (ref 94–109)
CO2 SERPL-SCNC: 24 MMOL/L (ref 20–32)
CREAT SERPL-MCNC: 0.96 MG/DL (ref 0.66–1.25)
GFR SERPL CREATININE-BSD FRML MDRD: 76 ML/MIN/1.73M2
GLUCOSE BLD-MCNC: 237 MG/DL (ref 70–99)
GLUCOSE BLDC GLUCOMTR-MCNC: 163 MG/DL (ref 70–99)
GLUCOSE BLDC GLUCOMTR-MCNC: 195 MG/DL (ref 70–99)
GLUCOSE BLDC GLUCOMTR-MCNC: 198 MG/DL (ref 70–99)
PATH REPORT.COMMENTS IMP SPEC: NORMAL
PATH REPORT.COMMENTS IMP SPEC: NORMAL
PATH REPORT.FINAL DX SPEC: NORMAL
PATH REPORT.MICROSCOPIC SPEC OTHER STN: NORMAL
PATH REPORT.MICROSCOPIC SPEC OTHER STN: NORMAL
PATH REPORT.RELEVANT HX SPEC: NORMAL
POTASSIUM BLD-SCNC: 4.4 MMOL/L (ref 3.4–5.3)
SARS-COV-2 RNA RESP QL NAA+PROBE: NEGATIVE
SODIUM SERPL-SCNC: 137 MMOL/L (ref 133–144)

## 2022-08-08 PROCEDURE — 250N000013 HC RX MED GY IP 250 OP 250 PS 637: Performed by: HOSPITALIST

## 2022-08-08 PROCEDURE — 97530 THERAPEUTIC ACTIVITIES: CPT | Mod: GP | Performed by: PHYSICAL THERAPIST

## 2022-08-08 PROCEDURE — 99233 SBSQ HOSP IP/OBS HIGH 50: CPT | Performed by: INTERNAL MEDICINE

## 2022-08-08 PROCEDURE — 97116 GAIT TRAINING THERAPY: CPT | Mod: GP | Performed by: PHYSICAL THERAPIST

## 2022-08-08 PROCEDURE — 36415 COLL VENOUS BLD VENIPUNCTURE: CPT | Performed by: INTERNAL MEDICINE

## 2022-08-08 PROCEDURE — 85060 BLOOD SMEAR INTERPRETATION: CPT | Performed by: PATHOLOGY

## 2022-08-08 PROCEDURE — 250N000013 HC RX MED GY IP 250 OP 250 PS 637: Performed by: INTERNAL MEDICINE

## 2022-08-08 PROCEDURE — 250N000013 HC RX MED GY IP 250 OP 250 PS 637: Performed by: STUDENT IN AN ORGANIZED HEALTH CARE EDUCATION/TRAINING PROGRAM

## 2022-08-08 PROCEDURE — 97530 THERAPEUTIC ACTIVITIES: CPT | Mod: GO

## 2022-08-08 PROCEDURE — 82310 ASSAY OF CALCIUM: CPT | Performed by: INTERNAL MEDICINE

## 2022-08-08 PROCEDURE — 120N000001 HC R&B MED SURG/OB

## 2022-08-08 PROCEDURE — U0005 INFEC AGEN DETEC AMPLI PROBE: HCPCS | Performed by: INTERNAL MEDICINE

## 2022-08-08 PROCEDURE — 97110 THERAPEUTIC EXERCISES: CPT | Mod: GO

## 2022-08-08 PROCEDURE — 92507 TX SP LANG VOICE COMM INDIV: CPT | Mod: GN | Performed by: SPEECH-LANGUAGE PATHOLOGIST

## 2022-08-08 PROCEDURE — 97535 SELF CARE MNGMENT TRAINING: CPT | Mod: GO

## 2022-08-08 RX ADMIN — OLANZAPINE 5 MG: 5 TABLET, ORALLY DISINTEGRATING ORAL at 20:13

## 2022-08-08 RX ADMIN — INSULIN ASPART 3 UNITS: 100 INJECTION, SOLUTION INTRAVENOUS; SUBCUTANEOUS at 08:25

## 2022-08-08 RX ADMIN — INSULIN ASPART 7 UNITS: 100 INJECTION, SOLUTION INTRAVENOUS; SUBCUTANEOUS at 18:16

## 2022-08-08 RX ADMIN — APIXABAN 5 MG: 5 TABLET, FILM COATED ORAL at 08:25

## 2022-08-08 RX ADMIN — METOPROLOL SUCCINATE 25 MG: 25 TABLET, EXTENDED RELEASE ORAL at 08:25

## 2022-08-08 RX ADMIN — Medication 1 MG: at 20:12

## 2022-08-08 RX ADMIN — TAMSULOSIN HYDROCHLORIDE 0.4 MG: 0.4 CAPSULE ORAL at 08:25

## 2022-08-08 RX ADMIN — INSULIN ASPART 3 UNITS: 100 INJECTION, SOLUTION INTRAVENOUS; SUBCUTANEOUS at 11:50

## 2022-08-08 RX ADMIN — ATORVASTATIN CALCIUM 10 MG: 10 TABLET, FILM COATED ORAL at 20:13

## 2022-08-08 RX ADMIN — ASPIRIN 81 MG CHEWABLE TABLET 81 MG: 81 TABLET CHEWABLE at 08:25

## 2022-08-08 RX ADMIN — APIXABAN 5 MG: 5 TABLET, FILM COATED ORAL at 20:13

## 2022-08-08 ASSESSMENT — ACTIVITIES OF DAILY LIVING (ADL)
ADLS_ACUITY_SCORE: 49
ADLS_ACUITY_SCORE: 48
ADLS_ACUITY_SCORE: 48
ADLS_ACUITY_SCORE: 49
ADLS_ACUITY_SCORE: 48
ADLS_ACUITY_SCORE: 48
ADLS_ACUITY_SCORE: 49
ADLS_ACUITY_SCORE: 48
ADLS_ACUITY_SCORE: 49
ADLS_ACUITY_SCORE: 48

## 2022-08-08 NOTE — CONSULTS
"BRIEF NUTRITION ASSESSMENT      REASON FOR ASSESSMENT:  Nathan Macias is a 89 year old male seen by Registered Dietitian for:   1) Admission Nutrition Risk Screen-  Have you recently lost weight without trying? \"14-23#\"  Have you been eating poorly because of a decreased appetite? \"No\"  2) RN Consult - \"Wt loss\"    CURRENT DIET AND INTAKE:  Diet:  Moderate CHO, Minced/Moist (L5), Moderately Thick Liquids (L3)           Room Service with Assist              Chart reviewed  Pt currently with a sitter (restlessness/impulsivity - some confusion)  Stopped by to visit pt - eating lunch (chicken, carrots, potatoes, juice, GelateinPLUS)  Pt tells me that the food is tasting very good  \"They put that thick stuff in my liquids\"  Overall, pt has been tolerating po and consuming ~100%    Unable to obtain diet hx from pt at this time    TCU pending placement/no sitter for 24 hrs    ANTHROPOMETRICS:  Height: 5' 7.5\"  Weight:(8/1) 84 kg /  185 lbs 2.98 oz  Body mass index is 28.58 kg/m .   Weight Status: Overweight BMI 25-29.9  IBW:  68.6 kg  %IBW: 122%  Weight History: Records reflect wt has been stable over the past 6 months.  He is down ~60# from wt noted 14 yrs ago.     Wt Readings from Last 10 Encounters:   08/01/22 84 kg (185 lb 3 oz)   06/26/08 111.6 kg (246 lb)   06/19/08 111.6 kg (246 lb)   06/18/08 111.6 kg (246 lb)   06/04/08 111.4 kg (245 lb 11.2 oz)     1/27/22 182.3 kg    LABS:  Labs noted    MALNUTRITION:  Patient does not meet two of the criteria necessary for diagnosing malnutrition.       NUTRITION INTERVENTION:  Nutrition Diagnosis:  No nutrition diagnosis at this time.    Implementation:  Nutrition Education ---> not appropriate at this time due to patient condition      FOLLOW UP/MONITORING:   Will re-evaluate in 7 - 10 days, or sooner, if re-consulted.          "

## 2022-08-08 NOTE — PROVIDER NOTIFICATION
"Paged Dr. Ana Larson E \"FYI: Pt was bladder scanned at 0100 and he had 407ml urine in his bladder. he was bladder scanned again at 0500 with result of 314ml. will straight cath him again. thanks! \"   "

## 2022-08-08 NOTE — PROGRESS NOTES
[facility]  Hospitalist Progress Note  Jules Welch MD  08/08/2022    Assessment & Plan   Nathan Macias is a 89 year old male with a history of afib on Eliquis who was brought to the ED with dysarthria, aphasia and ataxia and noted to have an acute ischemic stroke of the RIGHT cerebellum. He is admitted for further evaluation and management.      Acute Ischemic stroke of RIGHT cerebellum  - TTE with normal EF, indeterminate diastolic function and neg bubble.  - Expressive aphasia (persistent), not a candidate for stroke intervention.  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 No evidence of malignancy on screen.   - Exam per neuro; mild dysarthria, decreased JAGDISH in RUE and RLE, ataxia with finger to nose and heel to shin with RUE and RLE. normal on the left  - dysarthria and ataxia improving, Strength intact. Taking po.   - not needing zyprexa overnight.   - sitter since fall on 8/6  - head ct post fall stable.   - Continue PTA statin. Cont PtA dose as LDL 40   - Cont PTA BP meds, eliquis  - Secondary stroke prevention with apixaban + ASA 81 mg daily x90 days, then just apixaban after that.   - neurochecks q4H   - PT/OT/SLP recommending TCU  - Long term goals < 130/80, LDL goal <70, A1c goal <7  - Follow up with general neurology in 6-8 weeks        Unwitnessed fall 8/6  No neuro changes. No change in status prefall  Bed alarm not on or not working  Pt is ataxic from stroke  No injury. Hb stable  Bun.cr elevated, give fluid bolus and maint fluids  -Cr better with fluids  -stable neuro checks over night.   -nl orthostatics today.      HERNAN.   prerenal  Hb stable.   Held ace inh and hydrochlorothiazide  Given 500cc fluid bolus and IVF  Cr at baseline and BUN improving  Discontinue hydrochlorothiazide in the future     Atrial fibrillation  - continue BB and apixaban.     DMII. A1C is 7.1.   range  today range.   - hold metformin while inpatient  - sliding scale insulin  -added prandial aspart --> increased to 1 unit per 10 gram carbs 8/6, adjust as needed.      Hypertension  Good control  - Resumed PTA meds.  - Prn hydralazine.  - discontinue hydrochlorothiazide and resume lisinopril given HERNAN after discharge  -consider dose reduction of lisinopril    Cough  Sore throat:  No infection seen on CT chest.Neg for strep AG.  Afebrile.   - Cepacol. Prn.  - Prn antitussives.  - IS.     Confusion:  Probable underlyng cognitive issues.  Wife reports that the patient has been having issues with short-term memories for about a year (forgetting keys and groceries etc).  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 Needed sitter started.   -needed prn zyprex aon 8/2 and 8/3.   Impulsive. Pulled out iv, pulled at catheter.   8/4 No agitation, cooperative. Remains impulsive at times.   ucx ngtd  Cefuroxime for possible uti- stopped given ucx ngtd  Not need zyprexa, off restraints  -add qpm meletonin  - Continue 1:1.  - Delirium bundle.   - Feliz as below for retention.  - Formal cognitive testing as outpatient.       Prostatomegaly on CT (unsure if new but was on flomax PTA).  Urinary retention causing frequent cath.  Hx of tx for bph and LUTS for 2 years with flomax  UA 8/3: 37 wbc, 176 rbc  - Indewelling feliz.  - Continue flomax.  - Bladder scan given confusion.  - Urology consult. Seen 8/3, rec feliz and consider voiding trial in several days. Cont flomax. CT scan reviewed and agree with radiology interpretation . No indication for urgent urologic intervention. Urology signed off  - follow up ucx shows no growth  - cont emperic cefuroxime for now  - consider voiding trial in 2-3 days, if discharge with feliz outpatient urology follow up  [facility] urology or at [facility] urology  - pulled at catheter. Some blood in feliz. No clots. RN to flush, follow closely.   -8/5, still with bloody urine, ucx ngtd.   "  - failed TOV, will discharge with feliz catheter        Pulmonary nodule: 3mm, accidental.  - Repeat CT in 12 months.  - pcp follow up      DVT Prophylaxis: apixaban.    Code Status: Full Code     Disposition: once no longer needs sitter. Likely TCU    Interval History   -- chart reviewed  -- failted TOV  -- still needing a sitter    -Data reviewed today: I reviewed all new labs and imaging over the last 24 hours. I personally reviewed no images or EKG's today.    Physical Exam    , Blood pressure 134/63, pulse 67, temperature 98.3  F (36.8  C), temperature source Oral, resp. rate 18, height 1.715 m (5' 7.5\"), weight 84 kg (185 lb 3 oz), SpO2 96 %.  Vitals:    08/01/22 0944   Weight: 84 kg (185 lb 3 oz)     Vital Signs with Ranges  Temp:  [97.2  F (36.2  C)-98.3  F (36.8  C)] 98.3  F (36.8  C)  Pulse:  [50-82] 67  Resp:  [14-20] 18  BP: (105-149)/(57-75) 134/63  SpO2:  [93 %-97 %] 96 %  I/O's Last 24 hours  I/O last 3 completed shifts:  In: 470 [P.O.:470]  Out: 1995 [Urine:1995]    Constitutional: Awake, alert, not agitated  Respiratory: Clear to auscultation bilaterally, no crackles or wheezing  Cardiovascular: Regular rate and rhythm, normal S1 and S2   GI: Normal bowel sounds, soft, non-distended, non-tender  Skin/Integumen: No rashes, no cyanosis, no edema  Other:      Medications   All medications were reviewed.    - MEDICATION INSTRUCTIONS -       - MEDICATION INSTRUCTIONS -       - MEDICATION INSTRUCTIONS -         sodium chloride 0.9%  500 mL Intravenous Once     apixaban ANTICOAGULANT  5 mg Oral BID     aspirin  81 mg Oral Daily     atorvastatin  10 mg Oral or Feeding Tube QPM     [Held by provider] lisinopril  20 mg Oral Daily    And     [Held by provider] hydrochlorothiazide  25 mg Oral Daily     insulin aspart   Subcutaneous TID w/meals     insulin aspart  1-3 Units Subcutaneous TID AC     insulin aspart  1-3 Units Subcutaneous At Bedtime     melatonin  1 mg Oral QPM     metoprolol succinate ER  25 mg " Oral Daily     OLANZapine zydis  5 mg Oral At Bedtime     sodium chloride (PF)  3 mL Intracatheter Q8H     tamsulosin  0.4 mg Oral Daily        Data   Recent Labs   Lab 08/08/22  1044 08/08/22  0806 08/07/22  2155 08/07/22  1146 08/07/22  0728 08/06/22  1706 08/06/22  1555 08/05/22  1627 08/05/22  1356 08/04/22  1232 08/04/22  0833 08/03/22  0832 08/03/22  0822   WBC  --   --   --   --   --   --  9.4  --   --   --  8.7  --  7.6   HGB  --   --   --   --   --   --  12.4*  --  13.4  --  12.7*  --  14.3   MCV  --   --   --   --   --   --  92  --   --   --  92  --  93   PLT  --   --   --   --   --   --  187  --   --   --  172  --  161     --   --   --  137  --  135  --   --   --  137  --  139   POTASSIUM 4.4  --   --   --  4.2  --  5.0  --   --   --  4.0  --  4.0   CHLORIDE 107  --   --   --  105  --  103  --   --   --  105  --  105   CO2 24  --   --   --  27  --  28  --   --   --  27  --  28   BUN 35*  --   --   --  42*  --  59*  --   --   --  28  --  22   CR 0.96  --   --   --  1.11  --  1.44*  --   --   --  1.02  --  0.97   ANIONGAP 6  --   --   --  5  --  4  --   --   --  5  --  6   MAURICIO 8.5  --   --   --  8.5  --  8.4*  --   --   --  8.2*  --  8.9   * 163* 177*   < > 173*   < > 246*   < >  --    < > 176*   < > 166*    < > = values in this interval not displayed.       No results found for this or any previous visit (from the past 24 hour(s)).    Jules Welch MD  Text Page  (7am to 6pm)

## 2022-08-09 ENCOUNTER — APPOINTMENT (OUTPATIENT)
Dept: SPEECH THERAPY | Facility: CLINIC | Age: 87
DRG: 065 | End: 2022-08-09
Payer: COMMERCIAL

## 2022-08-09 ENCOUNTER — APPOINTMENT (OUTPATIENT)
Dept: PHYSICAL THERAPY | Facility: CLINIC | Age: 87
DRG: 065 | End: 2022-08-09
Payer: COMMERCIAL

## 2022-08-09 LAB
ANION GAP SERPL CALCULATED.3IONS-SCNC: 5 MMOL/L (ref 3–14)
BUN SERPL-MCNC: 29 MG/DL (ref 7–30)
CALCIUM SERPL-MCNC: 8.6 MG/DL (ref 8.5–10.1)
CHLORIDE BLD-SCNC: 108 MMOL/L (ref 94–109)
CO2 SERPL-SCNC: 25 MMOL/L (ref 20–32)
CREAT SERPL-MCNC: 0.88 MG/DL (ref 0.66–1.25)
ERYTHROCYTE [DISTWIDTH] IN BLOOD BY AUTOMATED COUNT: 13.2 % (ref 10–15)
GFR SERPL CREATININE-BSD FRML MDRD: 82 ML/MIN/1.73M2
GLUCOSE BLD-MCNC: 175 MG/DL (ref 70–99)
GLUCOSE BLDC GLUCOMTR-MCNC: 187 MG/DL (ref 70–99)
GLUCOSE BLDC GLUCOMTR-MCNC: 188 MG/DL (ref 70–99)
GLUCOSE BLDC GLUCOMTR-MCNC: 194 MG/DL (ref 70–99)
GLUCOSE BLDC GLUCOMTR-MCNC: 221 MG/DL (ref 70–99)
GLUCOSE BLDC GLUCOMTR-MCNC: 229 MG/DL (ref 70–99)
HCT VFR BLD AUTO: 36.4 % (ref 40–53)
HGB BLD-MCNC: 12.3 G/DL (ref 13.3–17.7)
MCH RBC QN AUTO: 31.4 PG (ref 26.5–33)
MCHC RBC AUTO-ENTMCNC: 33.8 G/DL (ref 31.5–36.5)
MCV RBC AUTO: 93 FL (ref 78–100)
PLATELET # BLD AUTO: 220 10E3/UL (ref 150–450)
POTASSIUM BLD-SCNC: 4.4 MMOL/L (ref 3.4–5.3)
RBC # BLD AUTO: 3.92 10E6/UL (ref 4.4–5.9)
SODIUM SERPL-SCNC: 138 MMOL/L (ref 133–144)
WBC # BLD AUTO: 7.5 10E3/UL (ref 4–11)

## 2022-08-09 PROCEDURE — 250N000013 HC RX MED GY IP 250 OP 250 PS 637: Performed by: INTERNAL MEDICINE

## 2022-08-09 PROCEDURE — 85027 COMPLETE CBC AUTOMATED: CPT | Performed by: INTERNAL MEDICINE

## 2022-08-09 PROCEDURE — 99232 SBSQ HOSP IP/OBS MODERATE 35: CPT | Performed by: INTERNAL MEDICINE

## 2022-08-09 PROCEDURE — 36415 COLL VENOUS BLD VENIPUNCTURE: CPT | Performed by: INTERNAL MEDICINE

## 2022-08-09 PROCEDURE — 250N000011 HC RX IP 250 OP 636: Performed by: INTERNAL MEDICINE

## 2022-08-09 PROCEDURE — 97116 GAIT TRAINING THERAPY: CPT | Mod: GP

## 2022-08-09 PROCEDURE — 250N000013 HC RX MED GY IP 250 OP 250 PS 637: Performed by: STUDENT IN AN ORGANIZED HEALTH CARE EDUCATION/TRAINING PROGRAM

## 2022-08-09 PROCEDURE — 80048 BASIC METABOLIC PNL TOTAL CA: CPT | Performed by: INTERNAL MEDICINE

## 2022-08-09 PROCEDURE — 92526 ORAL FUNCTION THERAPY: CPT | Mod: GN | Performed by: SPEECH-LANGUAGE PATHOLOGIST

## 2022-08-09 PROCEDURE — 120N000001 HC R&B MED SURG/OB

## 2022-08-09 PROCEDURE — 250N000013 HC RX MED GY IP 250 OP 250 PS 637: Performed by: HOSPITALIST

## 2022-08-09 PROCEDURE — 97530 THERAPEUTIC ACTIVITIES: CPT | Mod: GP

## 2022-08-09 PROCEDURE — 250N000009 HC RX 250

## 2022-08-09 RX ORDER — OLANZAPINE 5 MG/1
5 TABLET, ORALLY DISINTEGRATING ORAL 2 TIMES DAILY
Status: DISCONTINUED | OUTPATIENT
Start: 2022-08-09 | End: 2022-08-12

## 2022-08-09 RX ORDER — WATER 10 ML/10ML
INJECTION INTRAMUSCULAR; INTRAVENOUS; SUBCUTANEOUS
Status: COMPLETED
Start: 2022-08-09 | End: 2022-08-09

## 2022-08-09 RX ORDER — WATER 10 ML/10ML
INJECTION INTRAMUSCULAR; INTRAVENOUS; SUBCUTANEOUS
Status: DISPENSED
Start: 2022-08-09 | End: 2022-08-09

## 2022-08-09 RX ORDER — OLANZAPINE 10 MG/2ML
5 INJECTION, POWDER, FOR SOLUTION INTRAMUSCULAR EVERY 6 HOURS PRN
Status: DISCONTINUED | OUTPATIENT
Start: 2022-08-09 | End: 2022-08-30 | Stop reason: HOSPADM

## 2022-08-09 RX ORDER — OLANZAPINE 10 MG/2ML
2.5 INJECTION, POWDER, FOR SOLUTION INTRAMUSCULAR ONCE
Status: DISCONTINUED | OUTPATIENT
Start: 2022-08-09 | End: 2022-08-09

## 2022-08-09 RX ADMIN — ATORVASTATIN CALCIUM 10 MG: 10 TABLET, FILM COATED ORAL at 21:20

## 2022-08-09 RX ADMIN — METOPROLOL SUCCINATE 25 MG: 25 TABLET, EXTENDED RELEASE ORAL at 08:38

## 2022-08-09 RX ADMIN — APIXABAN 5 MG: 5 TABLET, FILM COATED ORAL at 08:38

## 2022-08-09 RX ADMIN — Medication 1 MG: at 21:20

## 2022-08-09 RX ADMIN — INSULIN ASPART 4 UNITS: 100 INJECTION, SOLUTION INTRAVENOUS; SUBCUTANEOUS at 12:15

## 2022-08-09 RX ADMIN — TAMSULOSIN HYDROCHLORIDE 0.4 MG: 0.4 CAPSULE ORAL at 08:38

## 2022-08-09 RX ADMIN — WATER 10 ML: 1 INJECTION INTRAMUSCULAR; INTRAVENOUS; SUBCUTANEOUS at 00:31

## 2022-08-09 RX ADMIN — ASPIRIN 81 MG CHEWABLE TABLET 81 MG: 81 TABLET CHEWABLE at 08:38

## 2022-08-09 RX ADMIN — INSULIN ASPART 8 UNITS: 100 INJECTION, SOLUTION INTRAVENOUS; SUBCUTANEOUS at 18:05

## 2022-08-09 RX ADMIN — OLANZAPINE 5 MG: 5 TABLET, ORALLY DISINTEGRATING ORAL at 21:56

## 2022-08-09 RX ADMIN — APIXABAN 5 MG: 5 TABLET, FILM COATED ORAL at 21:20

## 2022-08-09 RX ADMIN — WATER: 1 INJECTION INTRAMUSCULAR; INTRAVENOUS; SUBCUTANEOUS at 23:44

## 2022-08-09 RX ADMIN — OLANZAPINE 2.5 MG: 10 INJECTION, POWDER, FOR SOLUTION INTRAMUSCULAR at 00:27

## 2022-08-09 RX ADMIN — OLANZAPINE 5 MG: 10 INJECTION, POWDER, FOR SOLUTION INTRAMUSCULAR at 23:43

## 2022-08-09 RX ADMIN — INSULIN ASPART 5 UNITS: 100 INJECTION, SOLUTION INTRAVENOUS; SUBCUTANEOUS at 08:39

## 2022-08-09 ASSESSMENT — ACTIVITIES OF DAILY LIVING (ADL)
ADLS_ACUITY_SCORE: 48
ADLS_ACUITY_SCORE: 47
ADLS_ACUITY_SCORE: 48
ADLS_ACUITY_SCORE: 47
ADLS_ACUITY_SCORE: 48
ADLS_ACUITY_SCORE: 47
ADLS_ACUITY_SCORE: 48
ADLS_ACUITY_SCORE: 47

## 2022-08-09 NOTE — PROVIDER NOTIFICATION
"Paged Dr. Daniel YAO. \"Pt is being aggressive and trying to hit nursing staff, he is also agitated and impulsive. PRN zyprexa was given but not effective. need an order for bilateral soft wrist restraints.  Thanks\"  "

## 2022-08-09 NOTE — PROGRESS NOTES
Paged for severe agitation tonight, striking nursing staff, aggressive and impulsive. Soft restraints ordered for his safety and will also carefully order Zyprexa for relief of his symptoms.

## 2022-08-09 NOTE — PROVIDER NOTIFICATION
"Text page to Carlos Ya on stroke team: \" pt having trouble with tongue extension. Per previous charting was not a problem. Pt also states he is having trouble and feels it is worse. No other neurological changes noted. Any concerns/intervention?\"    Acknowledged, no orders  "

## 2022-08-10 ENCOUNTER — APPOINTMENT (OUTPATIENT)
Dept: OCCUPATIONAL THERAPY | Facility: CLINIC | Age: 87
DRG: 065 | End: 2022-08-10
Payer: COMMERCIAL

## 2022-08-10 ENCOUNTER — APPOINTMENT (OUTPATIENT)
Dept: SPEECH THERAPY | Facility: CLINIC | Age: 87
DRG: 065 | End: 2022-08-10
Payer: COMMERCIAL

## 2022-08-10 ENCOUNTER — APPOINTMENT (OUTPATIENT)
Dept: PHYSICAL THERAPY | Facility: CLINIC | Age: 87
DRG: 065 | End: 2022-08-10
Payer: COMMERCIAL

## 2022-08-10 LAB
ALBUMIN UR-MCNC: 30 MG/DL
APPEARANCE UR: CLEAR
BILIRUB UR QL STRIP: NEGATIVE
COLOR UR AUTO: YELLOW
GLUCOSE BLDC GLUCOMTR-MCNC: 183 MG/DL (ref 70–99)
GLUCOSE BLDC GLUCOMTR-MCNC: 203 MG/DL (ref 70–99)
GLUCOSE BLDC GLUCOMTR-MCNC: 210 MG/DL (ref 70–99)
GLUCOSE BLDC GLUCOMTR-MCNC: 217 MG/DL (ref 70–99)
GLUCOSE BLDC GLUCOMTR-MCNC: 234 MG/DL (ref 70–99)
GLUCOSE UR STRIP-MCNC: 100 MG/DL
GRANULAR CAST: 1 /LPF
HGB UR QL STRIP: ABNORMAL
HYALINE CASTS: 1 /LPF
KETONES UR STRIP-MCNC: NEGATIVE MG/DL
LEUKOCYTE ESTERASE UR QL STRIP: ABNORMAL
NITRATE UR QL: NEGATIVE
PH UR STRIP: 6 [PH] (ref 5–7)
RBC URINE: 30 /HPF
SP GR UR STRIP: 1.02 (ref 1–1.03)
UROBILINOGEN UR STRIP-MCNC: 2 MG/DL
WBC URINE: 62 /HPF

## 2022-08-10 PROCEDURE — 120N000001 HC R&B MED SURG/OB

## 2022-08-10 PROCEDURE — 250N000013 HC RX MED GY IP 250 OP 250 PS 637: Performed by: HOSPITALIST

## 2022-08-10 PROCEDURE — 250N000013 HC RX MED GY IP 250 OP 250 PS 637: Performed by: INTERNAL MEDICINE

## 2022-08-10 PROCEDURE — 97530 THERAPEUTIC ACTIVITIES: CPT | Mod: GO

## 2022-08-10 PROCEDURE — 250N000011 HC RX IP 250 OP 636: Performed by: INTERNAL MEDICINE

## 2022-08-10 PROCEDURE — 92507 TX SP LANG VOICE COMM INDIV: CPT | Mod: GN | Performed by: SPEECH-LANGUAGE PATHOLOGIST

## 2022-08-10 PROCEDURE — 97116 GAIT TRAINING THERAPY: CPT | Mod: GP

## 2022-08-10 PROCEDURE — 87086 URINE CULTURE/COLONY COUNT: CPT | Performed by: HOSPITALIST

## 2022-08-10 PROCEDURE — 92526 ORAL FUNCTION THERAPY: CPT | Mod: GN | Performed by: SPEECH-LANGUAGE PATHOLOGIST

## 2022-08-10 PROCEDURE — 250N000011 HC RX IP 250 OP 636: Performed by: HOSPITALIST

## 2022-08-10 PROCEDURE — 99232 SBSQ HOSP IP/OBS MODERATE 35: CPT | Performed by: INTERNAL MEDICINE

## 2022-08-10 PROCEDURE — 81001 URINALYSIS AUTO W/SCOPE: CPT | Performed by: HOSPITALIST

## 2022-08-10 PROCEDURE — 97530 THERAPEUTIC ACTIVITIES: CPT | Mod: GP

## 2022-08-10 PROCEDURE — 250N000013 HC RX MED GY IP 250 OP 250 PS 637: Performed by: STUDENT IN AN ORGANIZED HEALTH CARE EDUCATION/TRAINING PROGRAM

## 2022-08-10 RX ORDER — CEFTRIAXONE 1 G/1
1 INJECTION, POWDER, FOR SOLUTION INTRAMUSCULAR; INTRAVENOUS EVERY 24 HOURS
Status: DISCONTINUED | OUTPATIENT
Start: 2022-08-10 | End: 2022-08-12

## 2022-08-10 RX ADMIN — APIXABAN 5 MG: 5 TABLET, FILM COATED ORAL at 08:50

## 2022-08-10 RX ADMIN — ATORVASTATIN CALCIUM 10 MG: 10 TABLET, FILM COATED ORAL at 19:56

## 2022-08-10 RX ADMIN — TAMSULOSIN HYDROCHLORIDE 0.4 MG: 0.4 CAPSULE ORAL at 08:50

## 2022-08-10 RX ADMIN — APIXABAN 5 MG: 5 TABLET, FILM COATED ORAL at 19:56

## 2022-08-10 RX ADMIN — OLANZAPINE 5 MG: 5 TABLET, ORALLY DISINTEGRATING ORAL at 19:56

## 2022-08-10 RX ADMIN — OLANZAPINE 5 MG: 10 INJECTION, POWDER, FOR SOLUTION INTRAMUSCULAR at 22:03

## 2022-08-10 RX ADMIN — ASPIRIN 81 MG CHEWABLE TABLET 81 MG: 81 TABLET CHEWABLE at 08:50

## 2022-08-10 RX ADMIN — INSULIN ASPART 4 UNITS: 100 INJECTION, SOLUTION INTRAVENOUS; SUBCUTANEOUS at 17:58

## 2022-08-10 RX ADMIN — CEFTRIAXONE 1 G: 1 INJECTION, POWDER, FOR SOLUTION INTRAMUSCULAR; INTRAVENOUS at 06:43

## 2022-08-10 RX ADMIN — Medication 1 MG: at 19:57

## 2022-08-10 RX ADMIN — ACETAMINOPHEN 1000 MG: 500 TABLET, FILM COATED ORAL at 03:43

## 2022-08-10 RX ADMIN — INSULIN ASPART 1 UNITS: 100 INJECTION, SOLUTION INTRAVENOUS; SUBCUTANEOUS at 08:52

## 2022-08-10 RX ADMIN — OLANZAPINE 5 MG: 5 TABLET, ORALLY DISINTEGRATING ORAL at 08:50

## 2022-08-10 RX ADMIN — METOPROLOL SUCCINATE 25 MG: 25 TABLET, EXTENDED RELEASE ORAL at 08:50

## 2022-08-10 ASSESSMENT — ACTIVITIES OF DAILY LIVING (ADL)
ADLS_ACUITY_SCORE: 47

## 2022-08-10 NOTE — PROGRESS NOTES
Right ischemic cerebral stroke. Unwitnessed fall at hospital on 08/06. HERNAN. HTN. DM2. Delirium. Hx a fib. Disoriented to place, time, situation. Slurred speech. States right side of face is numb. Repeat demonstration of commands needed but pt follows commands. Finger to nose slow but able. Unable to do heel to shin but can lift both legs. Left arm weakness. Unable to track visually right to left. Wears glasses. [place] but no aids. Denies pain except left shoulder discomfort during neuro checks. VSS on RA ex HTN. Pt  Occasionally complains of a sore throat. infrequent cough. Crackles in lungs. Abdomen distended. PIV SL. Abrasion on back and scattered bruising. Reddened urethra with small amount of white discharge. UA+ on ABX q24hrs. Pollack in place. Urine is not as red/surya and starting to look more yellow. Ax2 with GB/W. Takes meds crushed in applesauce. Sitter at bedside as pt pulls on lines. Plan to discontinue sitter tomorrow morning and discharge to TCU on 08/12. Tolerating minced, moist mod carb diet with moderately thick liquids.

## 2022-08-10 NOTE — PROVIDER NOTIFICATION
MD Notification    Notified Person: MD Yuan     Notification Date/Time: 8/10/2022 12:50 AM     Notification Interaction: web based paging    Purpose of Notification: Pt here w/ R CVA, VSS ex temp 99.5, Pt confused/ agitated and is frequently attempting to pull at feliz cath, at 00:00 puss and redness near urethra noted, do you want a UA?    Orders Received: UA ordered    Comments: resulted +, antibiotics ordered

## 2022-08-10 NOTE — PROGRESS NOTES
[facility]  Hospitalist Progress Note  Jules Welch MD  08/10/2022    Assessment & Plan   Nathan Macias is a 89 year old male with a history of afib on Eliquis who was brought to the ED with dysarthria, aphasia and ataxia and noted to have an acute ischemic stroke of the RIGHT cerebellum. He is admitted for further evaluation and management.      Acute Ischemic stroke of RIGHT cerebellum  - TTE with normal EF, indeterminate diastolic function and neg bubble.  - Expressive aphasia (persistent), not a candidate for stroke intervention.  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 No evidence of malignancy on screen.   - Exam per neuro; mild dysarthria, decreased JAGDISH in RUE and RLE, ataxia with finger to nose and heel to shin with RUE and RLE. normal on the left  - dysarthria and ataxia improving, Strength intact. Taking po.   - not needing zyprexa overnight.   - sitter since fall on 8/6  - head ct post fall stable.   - Continue PTA statin. Cont PtA dose as LDL 40   - Cont PTA BP meds, eliquis  - Secondary stroke prevention with apixaban + ASA 81 mg daily x90 days, then just apixaban after that.   - neurochecks q4H   - PT/OT/SLP recommending TCU  - Long term goals < 130/80, LDL goal <70, A1c goal <7  - Follow up with general neurology in 6-8 weeks        Unwitnessed fall 8/6  No neuro changes. No change in status prefall  Bed alarm not on or not working  Pt is ataxic from stroke  No injury. Hb stable  Bun.cr elevated, give fluid bolus and maint fluids  -Cr better with fluids  -stable neuro checks over night.   -nl orthostatics today.      HERNAN.   prerenal  Hb stable.   Held ace inh and hydrochlorothiazide  Given 500cc fluid bolus and IVF  Cr at baseline and BUN improving  Discontinue hydrochlorothiazide in the future     Atrial fibrillation  - continue BB and apixaban.     DMII. A1C is 7.1.   range  today range.   - hold metformin while inpatient  - sliding scale insulin  -added prandial aspart --> increased to 1 unit per 10 gram carbs 8/6, adjust as needed.      Hypertension  Good control  - Resumed PTA meds.  - Prn hydralazine.  - discontinue hydrochlorothiazide and resume lisinopril given HERNAN after discharge  -consider dose reduction of lisinopril    Cough  Sore throat:  No infection seen on CT chest.Neg for strep AG.  Afebrile.   - Cepacol. Prn.  - Prn antitussives.  - IS.     Delirium  Probable underlyng cognitive issues.  Wife reports that the patient has been having issues with short-term memories for about a year (forgetting keys and groceries etc).  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 Needed sitter started.   -needed prn zyprex aon 8/2 and 8/3.   Impulsive. Pulled out iv, pulled at catheter.   8/4 No agitation, cooperative. Remains impulsive at times.   ucx ngtd  Cefuroxime for possible uti- stopped given ucx ngt   -add qpm meletonin  - Continue 1:1.  - Delirium bundle, decrease frequency of vitals and telemetry  - Feliz as below for retention.  - Formal cognitive testing as outpatient.  - increase zyprexa to 5mg bid and 5mg q 6 hrs prn for agitation       Prostatomegaly on CT (unsure if new but was on flomax PTA).  Urinary retention causing frequent cath.  Hx of tx for bph and LUTS for 2 years with flomax  UA 8/3: 37 wbc, 176 rbc  - Indewelling feliz.  - Continue flomax.  - Bladder scan given confusion.  - Urology consult. Seen 8/3, rec feliz and consider voiding trial in several days. Cont flomax. CT scan reviewed and agree with radiology interpretation . No indication for urgent urologic intervention. Urology signed off  - follow up ucx shows no growth  - consider voiding trial in 2-3 days, if discharge with feliz outpatient urology follow up  [facility] urology or at [facility] urology  - pulled at catheter. Some blood in feliz. No clots. RN to flush, follow closely.   -8/5,  "still with bloody urine, ucx ngtd.    - failed TOV, will discharge with feliz catheter        Pulmonary nodule: 3mm, accidental.  - Repeat CT in 12 months.  - pcp follow up      DVT Prophylaxis: apixaban.    Code Status: Full Code     Disposition:   -- plan to discontinue sitter on 8/11 AM  -- plan for TCU on 8/12    Interval History   -- again agitated overnight  -- noted purulence around urinary catheter  -- UA abnormal, started on IV ceftriaxone  -- wife at bedside more calm now    -Data reviewed today: I reviewed all new labs and imaging over the last 24 hours. I personally reviewed no images or EKG's today.    Physical Exam    , Blood pressure 136/70, pulse 81, temperature 97.7  F (36.5  C), temperature source Axillary, resp. rate 16, height 1.715 m (5' 7.5\"), weight 84 kg (185 lb 3 oz), SpO2 95 %.  Vitals:    08/01/22 0944   Weight: 84 kg (185 lb 3 oz)     Vital Signs with Ranges  Temp:  [97.4  F (36.3  C)-99.5  F (37.5  C)] 97.7  F (36.5  C)  Pulse:  [66-88] 81  Resp:  [16] 16  BP: (136-165)/(59-89) 136/70  SpO2:  [94 %-96 %] 95 %  I/O's Last 24 hours  I/O last 3 completed shifts:  In: 606 [P.O.:600; I.V.:6]  Out: 825 [Urine:825]    Constitutional: not agitated, needs 2 assist with walking  Respiratory: Clear to auscultation bilaterally, no crackles or wheezing  Cardiovascular: Regular rate and rhythm, normal S1 and S2   GI: Normal bowel sounds, soft, non-distended, non-tender  Skin/Integumen: No rashes, no cyanosis, no edema  Other: Dysarthria, bilateral UE ataxia    Medications   All medications were reviewed.    - MEDICATION INSTRUCTIONS -       - MEDICATION INSTRUCTIONS -       - MEDICATION INSTRUCTIONS -         sodium chloride 0.9%  500 mL Intravenous Once     apixaban ANTICOAGULANT  5 mg Oral BID     aspirin  81 mg Oral Daily     atorvastatin  10 mg Oral or Feeding Tube QPM     cefTRIAXone  1 g Intravenous Q24H     [Held by provider] lisinopril  20 mg Oral Daily    And     [Held by provider] " hydrochlorothiazide  25 mg Oral Daily     insulin aspart   Subcutaneous TID w/meals     insulin aspart  1-3 Units Subcutaneous TID AC     insulin aspart  1-3 Units Subcutaneous At Bedtime     melatonin  1 mg Oral QPM     metoprolol succinate ER  25 mg Oral Daily     OLANZapine zydis  5 mg Oral BID     sodium chloride (PF)  3 mL Intracatheter Q8H     tamsulosin  0.4 mg Oral Daily        Data   Recent Labs   Lab 08/10/22  1222 08/10/22  0750 08/10/22  0202 08/09/22  0812 08/09/22  0729 08/08/22  1640 08/08/22  1044 08/07/22  1146 08/07/22  0728 08/06/22  1706 08/06/22  1555 08/05/22  1627 08/05/22  1356 08/04/22  1232 08/04/22  0833   WBC  --   --   --   --  7.5  --   --   --   --   --  9.4  --   --   --  8.7   HGB  --   --   --   --  12.3*  --   --   --   --   --  12.4*  --  13.4  --  12.7*   MCV  --   --   --   --  93  --   --   --   --   --  92  --   --   --  92   PLT  --   --   --   --  220  --   --   --   --   --  187  --   --   --  172   NA  --   --   --   --  138  --  137  --  137  --  135  --   --   --  137   POTASSIUM  --   --   --   --  4.4  --  4.4  --  4.2  --  5.0  --   --   --  4.0   CHLORIDE  --   --   --   --  108  --  107  --  105  --  103  --   --   --  105   CO2  --   --   --   --  25  --  24  --  27  --  28  --   --   --  27   BUN  --   --   --   --  29  --  35*  --  42*  --  59*  --   --   --  28   CR  --   --   --   --  0.88  --  0.96  --  1.11  --  1.44*  --   --   --  1.02   ANIONGAP  --   --   --   --  5  --  6  --  5  --  4  --   --   --  5   MAURICIO  --   --   --   --  8.6  --  8.5  --  8.5  --  8.4*  --   --   --  8.2*   * 183* 210*   < > 175*   < > 237*   < > 173*   < > 246*   < >  --    < > 176*    < > = values in this interval not displayed.       No results found for this or any previous visit (from the past 24 hour(s)).    Jules Welch MD  Text Page  (7am to 6pm)

## 2022-08-10 NOTE — PROGRESS NOTES
Paged and informed he has purulent drainage from his urethra tonight, in the setting of ongoing intermittent agitation and pulling at his [place] catheter. Case reviewed and noted that he had received 3 days of Cefuroxime from 8/3 to 8/6 for positive UA at that time, but urine cultures were negative.    I ordered a repeat UA and this shows ongoing pyuria and hematuria. Plan to order empiric Ceftriaxone for now as we follow up repeat urine culture results.

## 2022-08-11 ENCOUNTER — APPOINTMENT (OUTPATIENT)
Dept: OCCUPATIONAL THERAPY | Facility: CLINIC | Age: 87
DRG: 065 | End: 2022-08-11
Payer: COMMERCIAL

## 2022-08-11 ENCOUNTER — APPOINTMENT (OUTPATIENT)
Dept: PHYSICAL THERAPY | Facility: CLINIC | Age: 87
DRG: 065 | End: 2022-08-11
Payer: COMMERCIAL

## 2022-08-11 ENCOUNTER — APPOINTMENT (OUTPATIENT)
Dept: SPEECH THERAPY | Facility: CLINIC | Age: 87
DRG: 065 | End: 2022-08-11
Payer: COMMERCIAL

## 2022-08-11 LAB
BACTERIA UR CULT: NO GROWTH
GLUCOSE BLDC GLUCOMTR-MCNC: 179 MG/DL (ref 70–99)
GLUCOSE BLDC GLUCOMTR-MCNC: 181 MG/DL (ref 70–99)
GLUCOSE BLDC GLUCOMTR-MCNC: 188 MG/DL (ref 70–99)
GLUCOSE BLDC GLUCOMTR-MCNC: 191 MG/DL (ref 70–99)
GLUCOSE BLDC GLUCOMTR-MCNC: 219 MG/DL (ref 70–99)

## 2022-08-11 PROCEDURE — 250N000013 HC RX MED GY IP 250 OP 250 PS 637: Performed by: INTERNAL MEDICINE

## 2022-08-11 PROCEDURE — 250N000013 HC RX MED GY IP 250 OP 250 PS 637: Performed by: STUDENT IN AN ORGANIZED HEALTH CARE EDUCATION/TRAINING PROGRAM

## 2022-08-11 PROCEDURE — 250N000011 HC RX IP 250 OP 636: Performed by: HOSPITALIST

## 2022-08-11 PROCEDURE — 250N000013 HC RX MED GY IP 250 OP 250 PS 637: Performed by: HOSPITALIST

## 2022-08-11 PROCEDURE — 92526 ORAL FUNCTION THERAPY: CPT | Mod: GN | Performed by: SPEECH-LANGUAGE PATHOLOGIST

## 2022-08-11 PROCEDURE — 92507 TX SP LANG VOICE COMM INDIV: CPT | Mod: GN | Performed by: SPEECH-LANGUAGE PATHOLOGIST

## 2022-08-11 PROCEDURE — 120N000001 HC R&B MED SURG/OB

## 2022-08-11 PROCEDURE — 97530 THERAPEUTIC ACTIVITIES: CPT | Mod: GP

## 2022-08-11 PROCEDURE — 97110 THERAPEUTIC EXERCISES: CPT | Mod: GP

## 2022-08-11 PROCEDURE — 99231 SBSQ HOSP IP/OBS SF/LOW 25: CPT | Performed by: INTERNAL MEDICINE

## 2022-08-11 PROCEDURE — 97530 THERAPEUTIC ACTIVITIES: CPT | Mod: GO

## 2022-08-11 RX ADMIN — INSULIN ASPART 1 UNITS: 100 INJECTION, SOLUTION INTRAVENOUS; SUBCUTANEOUS at 17:33

## 2022-08-11 RX ADMIN — APIXABAN 5 MG: 5 TABLET, FILM COATED ORAL at 21:37

## 2022-08-11 RX ADMIN — INSULIN ASPART 1 UNITS: 100 INJECTION, SOLUTION INTRAVENOUS; SUBCUTANEOUS at 09:05

## 2022-08-11 RX ADMIN — OLANZAPINE 5 MG: 5 TABLET, ORALLY DISINTEGRATING ORAL at 09:01

## 2022-08-11 RX ADMIN — CEFTRIAXONE 1 G: 1 INJECTION, POWDER, FOR SOLUTION INTRAMUSCULAR; INTRAVENOUS at 06:11

## 2022-08-11 RX ADMIN — OLANZAPINE 5 MG: 5 TABLET, ORALLY DISINTEGRATING ORAL at 21:37

## 2022-08-11 RX ADMIN — APIXABAN 5 MG: 5 TABLET, FILM COATED ORAL at 09:01

## 2022-08-11 RX ADMIN — ATORVASTATIN CALCIUM 10 MG: 10 TABLET, FILM COATED ORAL at 21:37

## 2022-08-11 RX ADMIN — Medication 1 MG: at 21:37

## 2022-08-11 RX ADMIN — METOPROLOL SUCCINATE 25 MG: 25 TABLET, EXTENDED RELEASE ORAL at 09:01

## 2022-08-11 RX ADMIN — TAMSULOSIN HYDROCHLORIDE 0.4 MG: 0.4 CAPSULE ORAL at 09:01

## 2022-08-11 RX ADMIN — ASPIRIN 81 MG CHEWABLE TABLET 81 MG: 81 TABLET CHEWABLE at 09:00

## 2022-08-11 ASSESSMENT — ACTIVITIES OF DAILY LIVING (ADL)
ADLS_ACUITY_SCORE: 47
ADLS_ACUITY_SCORE: 46
ADLS_ACUITY_SCORE: 48
ADLS_ACUITY_SCORE: 46
ADLS_ACUITY_SCORE: 46
ADLS_ACUITY_SCORE: 48
ADLS_ACUITY_SCORE: 46
ADLS_ACUITY_SCORE: 46
ADLS_ACUITY_SCORE: 48
ADLS_ACUITY_SCORE: 48

## 2022-08-11 NOTE — PROGRESS NOTES
[facility]  Hospitalist Progress Note  Jules Welch MD  08/11/2022    Assessment & Plan   Nathan Macias is a 89 year old male with a history of afib on Eliquis who was brought to the ED with dysarthria, aphasia and ataxia and noted to have an acute ischemic stroke of the RIGHT cerebellum. He is admitted for further evaluation and management.      Acute Ischemic stroke of RIGHT cerebellum  - TTE with normal EF, indeterminate diastolic function and neg bubble.  - Expressive aphasia (persistent), not a candidate for stroke intervention.  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 No evidence of malignancy on screen.   - Exam per neuro; mild dysarthria, decreased JAGDISH in RUE and RLE, ataxia with finger to nose and heel to shin with RUE and RLE. normal on the left  - dysarthria and ataxia improving, Strength intact. Taking po.   - not needing zyprexa overnight.   - sitter since fall on 8/6  - head ct post fall stable.   - Continue PTA statin. Cont PtA dose as LDL 40   - Cont PTA BP meds, eliquis  - Secondary stroke prevention with apixaban + ASA 81 mg daily x90 days, then just apixaban after that.   - neurochecks q4H   - PT/OT/SLP recommending TCU  - Long term goals < 130/80, LDL goal <70, A1c goal <7  - Follow up with general neurology in 6-8 weeks        Unwitnessed fall 8/6  No neuro changes. No change in status prefall  Bed alarm not on or not working  Pt is ataxic from stroke  No injury. Hb stable  Bun.cr elevated, give fluid bolus and maint fluids  -Cr better with fluids  -stable neuro checks      HERNAN.   prerenal  Hb stable.   Held ace inh and hydrochlorothiazide  Given 500cc fluid bolus and IVF  Cr at baseline and BUN improving  Discontinue hydrochlorothiazide in the future     Atrial fibrillation  - continue BB and apixaban.     DMII. A1C is 7.1.   range today range.   - hold metformin while  inpatient  - sliding scale insulin  -added prandial aspart --> increased to 1 unit per 10 gram carbs 8/6, adjust as needed.      Hypertension  Good control  - Resumed PTA meds.  - Prn hydralazine.  - discontinue hydrochlorothiazide and resume lisinopril given HERNAN after discharge  -consider dose reduction of lisinopril    Cough  Sore throat:  No infection seen on CT chest.Neg for strep AG.  Afebrile.   - Cepacol. Prn.  - Prn antitussives.  - IS.     Delirium  Probable underlyng cognitive issues.  Wife reports that the patient has been having issues with short-term memories for about a year (forgetting keys and groceries etc).  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 Needed sitter started.   -needed prn zyprex aon 8/2 and 8/3.   Impulsive. Pulled out iv, pulled at catheter.   8/4 No agitation, cooperative. Remains impulsive at times.   ucx ngtd  Cefuroxime for possible uti- stopped given ucx ngt   -add qpm meletonin  - Continue 1:1.  - Delirium bundle, decrease frequency of vitals and telemetry  - Feliz as below for retention.  - Formal cognitive testing as outpatient.  - continue zyprexa to 5mg bid and 5mg q 6 hrs prn for agitation       Prostatomegaly on CT (unsure if new but was on flomax PTA).  Urinary retention causing frequent cath.  Hx of tx for bph and LUTS for 2 years with flomax  UA 8/3: 37 wbc, 176 rbc  - Indewelling feliz.  - Continue flomax.  - Bladder scan given confusion.  - Urology consult. Seen 8/3, rec feliz and consider voiding trial in several days. Cont flomax. CT scan reviewed and agree with radiology interpretation . No indication for urgent urologic intervention. Urology signed off  - follow up ucx shows no growth  - consider voiding trial in 2-3 days, if discharge with feliz outpatient urology follow up  [facility] urology or at [facility] urology  - pulled at catheter. Some blood in feliz. No clots. RN to flush, follow closely.   -8/5, still with bloody urine, ucx ngtd.   "  - 8/10 UA abnormal, UCx negative, continue empiric iv ceftriaxone.        Pulmonary nodule: 3mm, accidental.  - Repeat CT in 12 months.  - pcp follow up      DVT Prophylaxis: apixaban.    Code Status: Full Code     Disposition:   -- plan to discontinue sitter on 8/11 AM  -- plan for TCU on 8/12    Interval History   -- again agitated overnight  -- noted purulence around urinary catheter  -- UA abnormal, started on IV ceftriaxone  -- wife at bedside more calm now    -Data reviewed today: I reviewed all new labs and imaging over the last 24 hours. I personally reviewed no images or EKG's today.    Physical Exam    , Blood pressure (!) 175/92, pulse 103, temperature 97.8  F (36.6  C), temperature source Axillary, resp. rate 16, height 1.715 m (5' 7.5\"), weight 84 kg (185 lb 3 oz), SpO2 96 %.  Vitals:    08/01/22 0944   Weight: 84 kg (185 lb 3 oz)     Vital Signs with Ranges  Temp:  [97.8  F (36.6  C)-98.9  F (37.2  C)] 97.8  F (36.6  C)  Pulse:  [] 103  Resp:  [16] 16  BP: (166-198)/() 175/92  SpO2:  [92 %-96 %] 96 %  I/O's Last 24 hours  I/O last 3 completed shifts:  In: 130 [P.O.:130]  Out: 900 [Urine:900]    Constitutional: not agitated, needs 2 assist with walking  Respiratory: Clear to auscultation bilaterally, no crackles or wheezing  Cardiovascular: Regular rate and rhythm, normal S1 and S2   GI: Normal bowel sounds, soft, non-distended, non-tender  Skin/Integumen: No rashes, no cyanosis, no edema  Other: Dysarthria, bilateral UE ataxia    Medications   All medications were reviewed.    - MEDICATION INSTRUCTIONS -       - MEDICATION INSTRUCTIONS -       - MEDICATION INSTRUCTIONS -         sodium chloride 0.9%  500 mL Intravenous Once     apixaban ANTICOAGULANT  5 mg Oral BID     aspirin  81 mg Oral Daily     atorvastatin  10 mg Oral or Feeding Tube QPM     cefTRIAXone  1 g Intravenous Q24H     [Held by provider] lisinopril  20 mg Oral Daily    And     [Held by provider] hydrochlorothiazide  25 mg " Oral Daily     insulin aspart   Subcutaneous TID w/meals     insulin aspart  1-3 Units Subcutaneous TID AC     insulin aspart  1-3 Units Subcutaneous At Bedtime     melatonin  1 mg Oral QPM     metoprolol succinate ER  25 mg Oral Daily     OLANZapine zydis  5 mg Oral BID     sodium chloride (PF)  3 mL Intracatheter Q8H     tamsulosin  0.4 mg Oral Daily        Data   Recent Labs   Lab 08/11/22  1224 08/11/22  0849 08/11/22  0159 08/09/22  0812 08/09/22  0729 08/08/22  1640 08/08/22  1044 08/07/22  1146 08/07/22  0728 08/06/22  1706 08/06/22  1555 08/05/22  1627 08/05/22  1356   WBC  --   --   --   --  7.5  --   --   --   --   --  9.4  --   --    HGB  --   --   --   --  12.3*  --   --   --   --   --  12.4*  --  13.4   MCV  --   --   --   --  93  --   --   --   --   --  92  --   --    PLT  --   --   --   --  220  --   --   --   --   --  187  --   --    NA  --   --   --   --  138  --  137  --  137  --  135   < >  --    POTASSIUM  --   --   --   --  4.4  --  4.4  --  4.2  --  5.0   < >  --    CHLORIDE  --   --   --   --  108  --  107  --  105  --  103   < >  --    CO2  --   --   --   --  25  --  24  --  27  --  28   < >  --    BUN  --   --   --   --  29  --  35*  --  42*  --  59*   < >  --    CR  --   --   --   --  0.88  --  0.96  --  1.11  --  1.44*   < >  --    ANIONGAP  --   --   --   --  5  --  6  --  5  --  4   < >  --    MAURICIO  --   --   --   --  8.6  --  8.5  --  8.5  --  8.4*   < >  --    * 179* 188*   < > 175*   < > 237*   < > 173*   < > 246*   < >  --     < > = values in this interval not displayed.       No results found for this or any previous visit (from the past 24 hour(s)).    Jules Welch MD  Text Page  (7am to 6pm)

## 2022-08-11 NOTE — PROGRESS NOTES
Care Management Follow Up    Length of Stay (days): 10    Expected Discharge Date: 08/12/2022     Concerns to be Addressed:       Patient plan of care discussed at interdisciplinary rounds: Yes    Anticipated Discharge Disposition: Anticipate TCU placement     Anticipated Discharge Services: Transportation Services  Anticipated Discharge DME:      Patient/family educated on Medicare website which has current facility and service quality ratings: Yes   Patient/Family in Agreement with the Plan: yes  SW met with pt's wife regarding TCU referrals and she is aware of needing to select TCU's from [facility] due to pt's insurance.  Wife is requesting placement at [facility] as [facility]. Pt's wife does not want pt to go to \[facility]\"" for TCU placment.  SW discussed [facility] and [facility] TCU's. Wife agreeable.     Referrals Placed by CM/SW:  [facility], [facility], [facility] and [facility] [place].  Private pay costs discussed:  Insurance disucussed ([employer])    Additional Information:  Pt's wife visiting at hospital and left stating she is not feeling well.    HELEN Hall  [facility]  Care Transitions  [phone]

## 2022-08-12 ENCOUNTER — APPOINTMENT (OUTPATIENT)
Dept: SPEECH THERAPY | Facility: CLINIC | Age: 87
DRG: 065 | End: 2022-08-12
Payer: COMMERCIAL

## 2022-08-12 ENCOUNTER — APPOINTMENT (OUTPATIENT)
Dept: PHYSICAL THERAPY | Facility: CLINIC | Age: 87
DRG: 065 | End: 2022-08-12
Payer: COMMERCIAL

## 2022-08-12 LAB
GLUCOSE BLDC GLUCOMTR-MCNC: 150 MG/DL (ref 70–99)
GLUCOSE BLDC GLUCOMTR-MCNC: 168 MG/DL (ref 70–99)
GLUCOSE BLDC GLUCOMTR-MCNC: 188 MG/DL (ref 70–99)
GLUCOSE BLDC GLUCOMTR-MCNC: 212 MG/DL (ref 70–99)

## 2022-08-12 PROCEDURE — 120N000001 HC R&B MED SURG/OB

## 2022-08-12 PROCEDURE — 250N000013 HC RX MED GY IP 250 OP 250 PS 637: Performed by: INTERNAL MEDICINE

## 2022-08-12 PROCEDURE — 92507 TX SP LANG VOICE COMM INDIV: CPT | Mod: GN | Performed by: SPEECH-LANGUAGE PATHOLOGIST

## 2022-08-12 PROCEDURE — 250N000013 HC RX MED GY IP 250 OP 250 PS 637: Performed by: HOSPITALIST

## 2022-08-12 PROCEDURE — 99232 SBSQ HOSP IP/OBS MODERATE 35: CPT | Performed by: INTERNAL MEDICINE

## 2022-08-12 PROCEDURE — 97530 THERAPEUTIC ACTIVITIES: CPT | Mod: GP

## 2022-08-12 PROCEDURE — 250N000011 HC RX IP 250 OP 636: Performed by: HOSPITALIST

## 2022-08-12 PROCEDURE — 250N000013 HC RX MED GY IP 250 OP 250 PS 637: Performed by: STUDENT IN AN ORGANIZED HEALTH CARE EDUCATION/TRAINING PROGRAM

## 2022-08-12 PROCEDURE — 92526 ORAL FUNCTION THERAPY: CPT | Mod: GN | Performed by: SPEECH-LANGUAGE PATHOLOGIST

## 2022-08-12 RX ORDER — OLANZAPINE 5 MG/1
5 TABLET, ORALLY DISINTEGRATING ORAL EVERY MORNING
Status: DISCONTINUED | OUTPATIENT
Start: 2022-08-13 | End: 2022-08-18

## 2022-08-12 RX ORDER — OLANZAPINE 5 MG/1
10 TABLET, ORALLY DISINTEGRATING ORAL AT BEDTIME
Status: DISCONTINUED | OUTPATIENT
Start: 2022-08-12 | End: 2022-08-13

## 2022-08-12 RX ADMIN — APIXABAN 5 MG: 5 TABLET, FILM COATED ORAL at 21:21

## 2022-08-12 RX ADMIN — CEFTRIAXONE 1 G: 1 INJECTION, POWDER, FOR SOLUTION INTRAMUSCULAR; INTRAVENOUS at 05:45

## 2022-08-12 RX ADMIN — INSULIN ASPART 3 UNITS: 100 INJECTION, SOLUTION INTRAVENOUS; SUBCUTANEOUS at 08:14

## 2022-08-12 RX ADMIN — Medication 1 MG: at 21:21

## 2022-08-12 RX ADMIN — OLANZAPINE 10 MG: 5 TABLET, ORALLY DISINTEGRATING ORAL at 21:20

## 2022-08-12 RX ADMIN — TAMSULOSIN HYDROCHLORIDE 0.4 MG: 0.4 CAPSULE ORAL at 08:13

## 2022-08-12 RX ADMIN — OLANZAPINE 5 MG: 5 TABLET, ORALLY DISINTEGRATING ORAL at 08:13

## 2022-08-12 RX ADMIN — INSULIN ASPART 5 UNITS: 100 INJECTION, SOLUTION INTRAVENOUS; SUBCUTANEOUS at 12:25

## 2022-08-12 RX ADMIN — METOPROLOL SUCCINATE 25 MG: 25 TABLET, EXTENDED RELEASE ORAL at 08:13

## 2022-08-12 RX ADMIN — ASPIRIN 81 MG CHEWABLE TABLET 81 MG: 81 TABLET CHEWABLE at 08:13

## 2022-08-12 RX ADMIN — QUETIAPINE 12.5 MG: 25 TABLET, FILM COATED ORAL at 00:37

## 2022-08-12 RX ADMIN — ATORVASTATIN CALCIUM 10 MG: 10 TABLET, FILM COATED ORAL at 21:21

## 2022-08-12 RX ADMIN — APIXABAN 5 MG: 5 TABLET, FILM COATED ORAL at 08:13

## 2022-08-12 RX ADMIN — INSULIN ASPART 3 UNITS: 100 INJECTION, SOLUTION INTRAVENOUS; SUBCUTANEOUS at 18:38

## 2022-08-12 ASSESSMENT — ACTIVITIES OF DAILY LIVING (ADL)
ADLS_ACUITY_SCORE: 49
ADLS_ACUITY_SCORE: 51
ADLS_ACUITY_SCORE: 47
ADLS_ACUITY_SCORE: 51
ADLS_ACUITY_SCORE: 49
ADLS_ACUITY_SCORE: 50
ADLS_ACUITY_SCORE: 51
ADLS_ACUITY_SCORE: 47
ADLS_ACUITY_SCORE: 49
ADLS_ACUITY_SCORE: 50
ADLS_ACUITY_SCORE: 49
ADLS_ACUITY_SCORE: 51

## 2022-08-12 NOTE — PROGRESS NOTES
Care Management Follow Up    Length of Stay (days): 11    Expected Discharge Date: 08/13/2022     Concerns to be Addressed:       Patient plan of care discussed at interdisciplinary rounds: Yes    Referrals out to TCU's however pt is not ready for discharge clinically at this time.    Anticipated Discharge Disposition: TCU     Anticipated Discharge Services: Transportation Services  Anticipated Discharge DME:      Patient/family educated on Medicare website which has current facility and service quality ratings:  yes  Education Provided on the Discharge Plan:    Patient/Family in Agreement with the Plan: yes    Referrals Placed by CM/SW:  TCU referrals are out.  Private pay costs discussed:     Additional Information:  KIMBERLY received call this am from pt's wife who expresses great concern regarding pt's potential discharge. She states she feels that pt is worse than he has been when first admitted. She states pt is delusional and hallucinating. KIMBERLY assured pt's wife that pt is not ready for discharge at this time. KIMBERLY updated bedside RN to check in with pt's wife. [place] is also requesting a call from hospitalist with update as her previous discussion with  hospitalist  Indicated potential discharge today. Pt does not have a TCU bed for today. KIMBERLY will continue discussion of potential bed at [facility] possibly available on Monday.      Emma Rosas [facility] [place]  Care Transitions  [phone]

## 2022-08-12 NOTE — PROGRESS NOTES
[facility]  Hospitalist Progress Note  Jules Welch MD  08/12/2022    Assessment & Plan   Nathan Macias is a 89 year old male with a history of afib on Eliquis who was brought to the ED with dysarthria, aphasia and ataxia and noted to have an acute ischemic stroke of the RIGHT cerebellum. He is admitted for further evaluation and management.      Acute Ischemic stroke of RIGHT cerebellum  - TTE with normal EF, indeterminate diastolic function and neg bubble.  - Expressive aphasia (persistent), not a candidate for stroke intervention.  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 No evidence of malignancy on screen.   - Exam per neuro; mild dysarthria, decreased JAGDISH in RUE and RLE, ataxia with finger to nose and heel to shin with RUE and RLE. normal on the left  - dysarthria and ataxia improving, Strength intact. Taking po.   - not needing zyprexa overnight.   - sitter since fall on 8/6  - head ct post fall stable.   - Continue PTA statin. Cont PtA dose as LDL 40   - Cont PTA BP meds, eliquis  - Secondary stroke prevention with apixaban + ASA 81 mg daily x90 days, then just apixaban after that.   - neurochecks q4H   - PT/OT/SLP recommending TCU  - Long term goals < 130/80, LDL goal <70, A1c goal <7  - Follow up with general neurology in 6-8 weeks        Unwitnessed fall 8/6  No neuro changes. No change in status prefall  Bed alarm not on or not working  Pt is ataxic from stroke  No injury. Hb stable  Bun.cr elevated, give fluid bolus and maint fluids  -Cr better with fluids  -stable neuro checks      HERNAN.   prerenal  Hb stable.   Held ace inh and hydrochlorothiazide  Given 500cc fluid bolus and IVF,now off IVF  Cr at baseline and BUN improving   Discontinue hydrochlorothiazide in the future     Atrial fibrillation  - continue BB and apixaban.     DMII. A1C is 7.1.   range today range.   - hold  metformin while inpatient  - sliding scale insulin  -added prandial aspart --> increased to 1 unit per 10 gram carbs 8/6, adjust as needed.      Hypertension  Good control  - Resumed PTA meds.  - Prn hydralazine.  - discontinue hydrochlorothiazide and resume lisinopril given HERNAN after discharge  -consider dose reduction of lisinopril    Cough  Sore throat:  No infection seen on CT chest.Neg for strep AG.  Afebrile.   - Cepacol. Prn.  - Prn antitussives.  - IS.     Delirium  Probable underlyng cognitive issues.  Wife reports that the patient has been having issues with short-term memories for about a year (forgetting keys and groceries etc).  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 Needed sitter started.   -needed prn zyprex aon 8/2 and 8/3.   Impulsive. Pulled out iv, pulled at catheter.   8/4 No agitation, cooperative. Remains impulsive at times.   ucx ngtd  Cefuroxime for possible uti- stopped given ucx ngt   -add qpm meletonin  - Continue 1:1.  - Delirium bundle, decrease frequency of vitals and telemetry  - Feliz as below for retention.  - Formal cognitive testing as outpatient.  - continue zyprexa to 5mg q AM and increase bedtime to 10 mg and 5mg q 6 hrs prn for agitation       Prostatomegaly on CT (unsure if new but was on flomax PTA).  Urinary retention causing frequent cath.  Hx of tx for bph and LUTS for 2 years with flomax  UA 8/3: 37 wbc, 176 rbc  - Indewelling feliz.  - Continue flomax.  - Bladder scan given confusion.  - Urology consult. Seen 8/3, rec feliz and consider voiding trial in several days. Cont flomax. CT scan reviewed and agree with radiology interpretation . No indication for urgent urologic intervention. Urology signed off  - follow up ucx shows no growth  - consider voiding trial in 2-3 days, if discharge with feliz outpatient urology follow up  [facility] urology or at [facility] urology  - pulled at catheter. Some blood in feliz. No clots. RN to flush, follow closely.  "  -8/5, still with bloody urine, ucx ngtd.    - 8/10 UA abnormal, UCx negative, discontinue empiric iv ceftriaxone.        Pulmonary nodule: 3mm, accidental.  - Repeat CT in 12 months.  - pcp follow up      DVT Prophylaxis: apixaban.    Code Status: Full Code     Disposition:   -- plan for TCU on 8/15    Interval History   -- less  Agitated but still needing sitter  -- attempted to call wife with update    -Data reviewed today: I reviewed all new labs and imaging over the last 24 hours. I personally reviewed no images or EKG's today.    Physical Exam    , Blood pressure 128/62, pulse 88, temperature 97.9  F (36.6  C), temperature source Oral, resp. rate 20, height 1.715 m (5' 7.5\"), weight 84 kg (185 lb 3 oz), SpO2 94 %.  Vitals:    08/01/22 0944   Weight: 84 kg (185 lb 3 oz)     Vital Signs with Ranges  Temp:  [97.2  F (36.2  C)-97.9  F (36.6  C)] 97.9  F (36.6  C)  Pulse:  [75-88] 88  Resp:  [18-20] 20  BP: (108-128)/(55-63) 128/62  SpO2:  [93 %-96 %] 94 %  I/O's Last 24 hours  I/O last 3 completed shifts:  In: 486 [P.O.:480; I.V.:6]  Out: 775 [Urine:775]    Constitutional: calm, follows commands, dysarthric  Respiratory: Clear to auscultation bilaterally, no crackles or wheezing  Cardiovascular: Regular rate and rhythm, normal S1 and S2   GI: Normal bowel sounds, soft, non-distended, non-tender  Skin/Integumen: No rashes, no cyanosis, no edema  Other: Dysarthria, bilateral UE ataxia    Medications   All medications were reviewed.    - MEDICATION INSTRUCTIONS -       - MEDICATION INSTRUCTIONS -       - MEDICATION INSTRUCTIONS -         sodium chloride 0.9%  500 mL Intravenous Once     apixaban ANTICOAGULANT  5 mg Oral BID     aspirin  81 mg Oral Daily     atorvastatin  10 mg Oral or Feeding Tube QPM     [Held by provider] lisinopril  20 mg Oral Daily    And     [Held by provider] hydrochlorothiazide  25 mg Oral Daily     insulin aspart   Subcutaneous TID w/meals     insulin aspart  1-3 Units Subcutaneous TID AC "     insulin aspart  1-3 Units Subcutaneous At Bedtime     melatonin  1 mg Oral QPM     metoprolol succinate ER  25 mg Oral Daily     OLANZapine zydis  10 mg Oral At Bedtime     [START ON 8/13/2022] OLANZapine zydis  5 mg Oral QAM     sodium chloride (PF)  3 mL Intracatheter Q8H     tamsulosin  0.4 mg Oral Daily        Data   Recent Labs   Lab 08/12/22  1224 08/12/22  0800 08/11/22  2132 08/09/22  0812 08/09/22  0729 08/08/22  1640 08/08/22  1044 08/07/22  1146 08/07/22  0728 08/06/22  1706 08/06/22  1555   WBC  --   --   --   --  7.5  --   --   --   --   --  9.4   HGB  --   --   --   --  12.3*  --   --   --   --   --  12.4*   MCV  --   --   --   --  93  --   --   --   --   --  92   PLT  --   --   --   --  220  --   --   --   --   --  187   NA  --   --   --   --  138  --  137  --  137  --  135   POTASSIUM  --   --   --   --  4.4  --  4.4  --  4.2  --  5.0   CHLORIDE  --   --   --   --  108  --  107  --  105  --  103   CO2  --   --   --   --  25  --  24  --  27  --  28   BUN  --   --   --   --  29  --  35*  --  42*  --  59*   CR  --   --   --   --  0.88  --  0.96  --  1.11  --  1.44*   ANIONGAP  --   --   --   --  5  --  6  --  5  --  4   MAURICIO  --   --   --   --  8.6  --  8.5  --  8.5  --  8.4*   * 188* 191*   < > 175*   < > 237*   < > 173*   < > 246*    < > = values in this interval not displayed.       No results found for this or any previous visit (from the past 24 hour(s)).    Jules Welch MD  Text Page  (7am to 6pm)

## 2022-08-12 NOTE — PROGRESS NOTES
Patient has been assessed for Home Oxygen needs. Oxygen readings:    *Pulse oximetry (SpO2) = 89% on room air at rest while awake.    *SpO2 improved to 96% on 1liters/minute at rest.    *SpO2 = 86% on room air during activity/with exercise.    *SpO2 improved to 92% on 1liters/minute during activity/with exercise.

## 2022-08-12 NOTE — PROVIDER NOTIFICATION
"Paged Dr. Daniel Yuan, \"Pt very restless, trying to get out of bed, not angry or aggressive. Scheduled Zyprexa/melatonin given at 2130. Would small dose of Seroquel be appropriate?\"  "

## 2022-08-13 ENCOUNTER — APPOINTMENT (OUTPATIENT)
Dept: CT IMAGING | Facility: CLINIC | Age: 87
DRG: 065 | End: 2022-08-13
Attending: INTERNAL MEDICINE
Payer: COMMERCIAL

## 2022-08-13 LAB
GLUCOSE BLDC GLUCOMTR-MCNC: 167 MG/DL (ref 70–99)
GLUCOSE BLDC GLUCOMTR-MCNC: 181 MG/DL (ref 70–99)
GLUCOSE BLDC GLUCOMTR-MCNC: 190 MG/DL (ref 70–99)
GLUCOSE BLDC GLUCOMTR-MCNC: 196 MG/DL (ref 70–99)
GLUCOSE BLDC GLUCOMTR-MCNC: 200 MG/DL (ref 70–99)

## 2022-08-13 PROCEDURE — 250N000013 HC RX MED GY IP 250 OP 250 PS 637: Performed by: INTERNAL MEDICINE

## 2022-08-13 PROCEDURE — 250N000013 HC RX MED GY IP 250 OP 250 PS 637: Performed by: HOSPITALIST

## 2022-08-13 PROCEDURE — 250N000009 HC RX 250

## 2022-08-13 PROCEDURE — 99232 SBSQ HOSP IP/OBS MODERATE 35: CPT | Performed by: INTERNAL MEDICINE

## 2022-08-13 PROCEDURE — 250N000011 HC RX IP 250 OP 636: Performed by: INTERNAL MEDICINE

## 2022-08-13 PROCEDURE — 70450 CT HEAD/BRAIN W/O DYE: CPT

## 2022-08-13 PROCEDURE — 120N000001 HC R&B MED SURG/OB

## 2022-08-13 PROCEDURE — 250N000013 HC RX MED GY IP 250 OP 250 PS 637: Performed by: STUDENT IN AN ORGANIZED HEALTH CARE EDUCATION/TRAINING PROGRAM

## 2022-08-13 RX ORDER — WATER 10 ML/10ML
INJECTION INTRAMUSCULAR; INTRAVENOUS; SUBCUTANEOUS
Status: COMPLETED
Start: 2022-08-13 | End: 2022-08-13

## 2022-08-13 RX ORDER — OLANZAPINE 5 MG/1
15 TABLET, ORALLY DISINTEGRATING ORAL AT BEDTIME
Status: DISCONTINUED | OUTPATIENT
Start: 2022-08-13 | End: 2022-08-14

## 2022-08-13 RX ADMIN — ASPIRIN 81 MG CHEWABLE TABLET 81 MG: 81 TABLET CHEWABLE at 08:16

## 2022-08-13 RX ADMIN — OLANZAPINE 15 MG: 5 TABLET, ORALLY DISINTEGRATING ORAL at 21:14

## 2022-08-13 RX ADMIN — ATORVASTATIN CALCIUM 10 MG: 10 TABLET, FILM COATED ORAL at 21:12

## 2022-08-13 RX ADMIN — INSULIN ASPART 1 UNITS: 100 INJECTION, SOLUTION INTRAVENOUS; SUBCUTANEOUS at 09:37

## 2022-08-13 RX ADMIN — INSULIN ASPART 5 UNITS: 100 INJECTION, SOLUTION INTRAVENOUS; SUBCUTANEOUS at 18:29

## 2022-08-13 RX ADMIN — OLANZAPINE 5 MG: 5 TABLET, ORALLY DISINTEGRATING ORAL at 08:16

## 2022-08-13 RX ADMIN — APIXABAN 5 MG: 5 TABLET, FILM COATED ORAL at 08:17

## 2022-08-13 RX ADMIN — OLANZAPINE 5 MG: 10 INJECTION, POWDER, FOR SOLUTION INTRAMUSCULAR at 00:57

## 2022-08-13 RX ADMIN — WATER 10 ML: 1 INJECTION INTRAMUSCULAR; INTRAVENOUS; SUBCUTANEOUS at 01:01

## 2022-08-13 RX ADMIN — METOPROLOL SUCCINATE 25 MG: 25 TABLET, EXTENDED RELEASE ORAL at 08:17

## 2022-08-13 RX ADMIN — TAMSULOSIN HYDROCHLORIDE 0.4 MG: 0.4 CAPSULE ORAL at 08:17

## 2022-08-13 RX ADMIN — Medication 1 MG: at 21:13

## 2022-08-13 RX ADMIN — APIXABAN 5 MG: 5 TABLET, FILM COATED ORAL at 21:12

## 2022-08-13 ASSESSMENT — ACTIVITIES OF DAILY LIVING (ADL)
ADLS_ACUITY_SCORE: 51
ADLS_ACUITY_SCORE: 51
ADLS_ACUITY_SCORE: 50
ADLS_ACUITY_SCORE: 50
ADLS_ACUITY_SCORE: 51
ADLS_ACUITY_SCORE: 50
ADLS_ACUITY_SCORE: 51
ADLS_ACUITY_SCORE: 51
ADLS_ACUITY_SCORE: 50
ADLS_ACUITY_SCORE: 51

## 2022-08-13 NOTE — PROGRESS NOTES
[facility]  Hospitalist Progress Note  Jules Welch MD  08/13/2022    Assessment & Plan   Nathan Macias is a 89 year old male with a history of afib on Eliquis who was brought to the ED with dysarthria, aphasia and ataxia and noted to have an acute ischemic stroke of the RIGHT cerebellum. He is admitted for further evaluation and management.      Acute Ischemic stroke of RIGHT cerebellum  - TTE with normal EF, indeterminate diastolic function and neg bubble.  - Expressive aphasia (persistent), not a candidate for stroke intervention.  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 No evidence of malignancy on screen.   - Exam per neuro; mild dysarthria, decreased JAGDISH in RUE and RLE, ataxia with finger to nose and heel to shin with RUE and RLE. normal on the left  - dysarthria and ataxia improving, Strength intact. Taking po.   - not needing zyprexa overnight.   - sitter since fall on 8/6  - head ct post fall stable.   - Continue PTA statin. Cont PtA dose as LDL 40   - Cont PTA BP meds, eliquis  - Secondary stroke prevention with apixaban + ASA 81 mg daily x90 days, then just apixaban after that.   - neurochecks q4H   - PT/OT/SLP recommending TCU  - Long term goals < 130/80, LDL goal <70, A1c goal <7  - Follow up with general neurology in 6-8 weeks   - will repeat CTH non contrast due to ongoing delirium.       Unwitnessed fall 8/6  No neuro changes. No change in status prefall  Bed alarm not on or not working  Pt is ataxic from stroke  No injury. Hb stable  Bun.cr elevated, give fluid bolus and maint fluids  -Cr better with fluids  -stable neuro checks      HERNAN.   prerenal  Hb stable.   Held ace inh and hydrochlorothiazide  Given 500cc fluid bolus and IVF,now off IVF  Cr at baseline and BUN improving   Discontinue hydrochlorothiazide in the future     Atrial fibrillation  - continue BB and  apixaban.     DMII. A1C is 7.1.   range today range.   - hold metformin while inpatient  - sliding scale insulin  - added prandial aspart --> increased to 1 unit per 10 gram carbs 8/6, adjust as needed.      Hypertension  Good control  - Resumed PTA meds.  - Prn hydralazine.  - discontinue hydrochlorothiazide and resume lisinopril given HERNAN after discharge  -consider dose reduction of lisinopril    Cough  Sore throat:  No infection seen on CT chest.Neg for strep AG.  Afebrile.   - Cepacol. Prn.  - Prn antitussives.  - IS.     Delirium  Probable underlyng cognitive issues.  Wife reports that the patient has been having issues with short-term memories for about a year (forgetting keys and groceries etc).  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 Needed sitter started.   -needed prn zyprex aon 8/2 and 8/3.   Impulsive. Pulled out iv, pulled at catheter.   8/4 No agitation, cooperative. Remains impulsive at times.   ucx ngtd  Cefuroxime for possible uti- stopped given ucx ngt   -add qpm meletonin  - Continue 1:1.  - Delirium bundle, decrease frequency of vitals and telemetry  - Feliz as below for retention.  - Formal cognitive testing as outpatient.  - continue zyprexa to 5mg q AM and increase bedtime to 15 mg and 5mg q 6 hrs prn for agitation       Prostatomegaly on CT (unsure if new but was on flomax PTA).  Urinary retention causing frequent cath.  Hx of tx for bph and LUTS for 2 years with flomax  UA 8/3: 37 wbc, 176 rbc  - Indewelling feliz.  - Continue flomax.  - Bladder scan given confusion.  - Urology consult. Seen 8/3, rec feliz and consider voiding trial in several days. Cont flomax. CT scan reviewed and agree with radiology interpretation . No indication for urgent urologic intervention. Urology signed off  - follow up ucx shows no growth  - consider voiding trial in 2-3 days, if discharge with feliz outpatient urology follow up  [facility] urology or at [facility] urology  - pulled at  "catheter. Some blood in feliz. No clots. RN to flush, follow closely.   -8/5, still with bloody urine, ucx ngtd.    - 8/10 UA abnormal, UCx negative, discontinue empiric iv ceftriaxone.        Pulmonary nodule: 3mm, accidental.  - Repeat CT in 12 months.  - pcp follow up      DVT Prophylaxis: apixaban.    Code Status: Full Code     Disposition:   -- plan for TCU not clear as he continues to be in delirium requiring a sitter    Interval History   -- hallucination, didn't sleep well got zyprexa at 6 AM and then morning zyprexa  -- wife at bedside    -Data reviewed today: I reviewed all new labs and imaging over the last 24 hours. I personally reviewed no images or EKG's today.    Physical Exam    , Blood pressure 138/79, pulse 89, temperature 97.4  F (36.3  C), temperature source Axillary, resp. rate 18, height 1.715 m (5' 7.5\"), weight 84 kg (185 lb 3 oz), SpO2 94 %.  Vitals:    08/01/22 0944   Weight: 84 kg (185 lb 3 oz)     Vital Signs with Ranges  Temp:  [97.4  F (36.3  C)-98.1  F (36.7  C)] 97.4  F (36.3  C)  Pulse:  [] 89  Resp:  [16-18] 18  BP: (116-145)/(75-79) 138/79  SpO2:  [93 %-95 %] 94 %  I/O's Last 24 hours  I/O last 3 completed shifts:  In: 120 [P.O.:120]  Out: 450 [Urine:450]    Constitutional: calm, follows commands, dysarthric  Respiratory: Clear to auscultation bilaterally, no crackles or wheezing  Cardiovascular: Regular rate and rhythm, normal S1 and S2   GI: Normal bowel sounds, soft, non-distended, non-tender  Skin/Integumen: No rashes, no cyanosis, no edema  Other: Dysarthria, bilateral UE ataxia    Medications   All medications were reviewed.    - MEDICATION INSTRUCTIONS -       - MEDICATION INSTRUCTIONS -       - MEDICATION INSTRUCTIONS -         sodium chloride 0.9%  500 mL Intravenous Once     apixaban ANTICOAGULANT  5 mg Oral BID     aspirin  81 mg Oral Daily     atorvastatin  10 mg Oral or Feeding Tube QPM     [Held by provider] lisinopril  20 mg Oral Daily    And     [Held by " provider] hydrochlorothiazide  25 mg Oral Daily     insulin aspart   Subcutaneous TID w/meals     insulin aspart  1-3 Units Subcutaneous TID AC     insulin aspart  1-3 Units Subcutaneous At Bedtime     melatonin  1 mg Oral QPM     metoprolol succinate ER  25 mg Oral Daily     OLANZapine zydis  10 mg Oral At Bedtime     OLANZapine zydis  5 mg Oral QAM     sodium chloride (PF)  3 mL Intracatheter Q8H     tamsulosin  0.4 mg Oral Daily        Data   Recent Labs   Lab 08/13/22  1251 08/13/22  0818 08/13/22  0144 08/09/22  0812 08/09/22  0729 08/08/22  1640 08/08/22  1044 08/07/22  1146 08/07/22  0728 08/06/22  1706 08/06/22  1555   WBC  --   --   --   --  7.5  --   --   --   --   --  9.4   HGB  --   --   --   --  12.3*  --   --   --   --   --  12.4*   MCV  --   --   --   --  93  --   --   --   --   --  92   PLT  --   --   --   --  220  --   --   --   --   --  187   NA  --   --   --   --  138  --  137  --  137  --  135   POTASSIUM  --   --   --   --  4.4  --  4.4  --  4.2  --  5.0   CHLORIDE  --   --   --   --  108  --  107  --  105  --  103   CO2  --   --   --   --  25  --  24  --  27  --  28   BUN  --   --   --   --  29  --  35*  --  42*  --  59*   CR  --   --   --   --  0.88  --  0.96  --  1.11  --  1.44*   ANIONGAP  --   --   --   --  5  --  6  --  5  --  4   MAURICIO  --   --   --   --  8.6  --  8.5  --  8.5  --  8.4*   * 190* 167*   < > 175*   < > 237*   < > 173*   < > 246*    < > = values in this interval not displayed.       No results found for this or any previous visit (from the past 24 hour(s)).    Jules Welch MD  Text Page  (7am to 6pm)

## 2022-08-14 LAB
ANION GAP SERPL CALCULATED.3IONS-SCNC: 6 MMOL/L (ref 3–14)
BUN SERPL-MCNC: 43 MG/DL (ref 7–30)
CALCIUM SERPL-MCNC: 8.8 MG/DL (ref 8.5–10.1)
CHLORIDE BLD-SCNC: 106 MMOL/L (ref 94–109)
CO2 SERPL-SCNC: 25 MMOL/L (ref 20–32)
CREAT SERPL-MCNC: 1.05 MG/DL (ref 0.66–1.25)
ERYTHROCYTE [DISTWIDTH] IN BLOOD BY AUTOMATED COUNT: 13.2 % (ref 10–15)
GFR SERPL CREATININE-BSD FRML MDRD: 68 ML/MIN/1.73M2
GLUCOSE BLD-MCNC: 171 MG/DL (ref 70–99)
GLUCOSE BLDC GLUCOMTR-MCNC: 150 MG/DL (ref 70–99)
GLUCOSE BLDC GLUCOMTR-MCNC: 161 MG/DL (ref 70–99)
GLUCOSE BLDC GLUCOMTR-MCNC: 177 MG/DL (ref 70–99)
GLUCOSE BLDC GLUCOMTR-MCNC: 195 MG/DL (ref 70–99)
GLUCOSE BLDC GLUCOMTR-MCNC: 223 MG/DL (ref 70–99)
HCT VFR BLD AUTO: 42.2 % (ref 40–53)
HGB BLD-MCNC: 14.3 G/DL (ref 13.3–17.7)
MCH RBC QN AUTO: 31.1 PG (ref 26.5–33)
MCHC RBC AUTO-ENTMCNC: 33.9 G/DL (ref 31.5–36.5)
MCV RBC AUTO: 92 FL (ref 78–100)
PLATELET # BLD AUTO: 147 10E3/UL (ref 150–450)
POTASSIUM BLD-SCNC: 4.8 MMOL/L (ref 3.4–5.3)
RBC # BLD AUTO: 4.6 10E6/UL (ref 4.4–5.9)
SODIUM SERPL-SCNC: 137 MMOL/L (ref 133–144)
WBC # BLD AUTO: 7.8 10E3/UL (ref 4–11)

## 2022-08-14 PROCEDURE — 250N000013 HC RX MED GY IP 250 OP 250 PS 637: Performed by: INTERNAL MEDICINE

## 2022-08-14 PROCEDURE — 250N000013 HC RX MED GY IP 250 OP 250 PS 637: Performed by: HOSPITALIST

## 2022-08-14 PROCEDURE — 82310 ASSAY OF CALCIUM: CPT | Performed by: INTERNAL MEDICINE

## 2022-08-14 PROCEDURE — 120N000001 HC R&B MED SURG/OB

## 2022-08-14 PROCEDURE — 36415 COLL VENOUS BLD VENIPUNCTURE: CPT | Performed by: INTERNAL MEDICINE

## 2022-08-14 PROCEDURE — 250N000013 HC RX MED GY IP 250 OP 250 PS 637: Performed by: STUDENT IN AN ORGANIZED HEALTH CARE EDUCATION/TRAINING PROGRAM

## 2022-08-14 PROCEDURE — 85027 COMPLETE CBC AUTOMATED: CPT | Performed by: INTERNAL MEDICINE

## 2022-08-14 PROCEDURE — 99232 SBSQ HOSP IP/OBS MODERATE 35: CPT | Performed by: INTERNAL MEDICINE

## 2022-08-14 RX ORDER — OLANZAPINE 10 MG/1
20 TABLET, ORALLY DISINTEGRATING ORAL AT BEDTIME
Status: DISCONTINUED | OUTPATIENT
Start: 2022-08-14 | End: 2022-08-18

## 2022-08-14 RX ADMIN — INSULIN ASPART 7 UNITS: 100 INJECTION, SOLUTION INTRAVENOUS; SUBCUTANEOUS at 18:17

## 2022-08-14 RX ADMIN — APIXABAN 5 MG: 5 TABLET, FILM COATED ORAL at 08:18

## 2022-08-14 RX ADMIN — ATORVASTATIN CALCIUM 10 MG: 10 TABLET, FILM COATED ORAL at 21:41

## 2022-08-14 RX ADMIN — ASPIRIN 81 MG CHEWABLE TABLET 81 MG: 81 TABLET CHEWABLE at 08:18

## 2022-08-14 RX ADMIN — APIXABAN 5 MG: 5 TABLET, FILM COATED ORAL at 21:41

## 2022-08-14 RX ADMIN — OLANZAPINE 20 MG: 10 TABLET, ORALLY DISINTEGRATING ORAL at 21:41

## 2022-08-14 RX ADMIN — OLANZAPINE 5 MG: 5 TABLET, ORALLY DISINTEGRATING ORAL at 08:19

## 2022-08-14 RX ADMIN — TAMSULOSIN HYDROCHLORIDE 0.4 MG: 0.4 CAPSULE ORAL at 08:19

## 2022-08-14 RX ADMIN — Medication 1 MG: at 21:41

## 2022-08-14 RX ADMIN — METOPROLOL SUCCINATE 25 MG: 25 TABLET, EXTENDED RELEASE ORAL at 08:19

## 2022-08-14 RX ADMIN — INSULIN ASPART 2 UNITS: 100 INJECTION, SOLUTION INTRAVENOUS; SUBCUTANEOUS at 09:34

## 2022-08-14 ASSESSMENT — ACTIVITIES OF DAILY LIVING (ADL)
ADLS_ACUITY_SCORE: 48
ADLS_ACUITY_SCORE: 51
ADLS_ACUITY_SCORE: 48
ADLS_ACUITY_SCORE: 51
ADLS_ACUITY_SCORE: 48
ADLS_ACUITY_SCORE: 50
ADLS_ACUITY_SCORE: 48
ADLS_ACUITY_SCORE: 48
ADLS_ACUITY_SCORE: 51

## 2022-08-14 NOTE — PROGRESS NOTES
[facility]  Hospitalist Progress Note  Jules Welch MD  08/14/2022    Assessment & Plan   Nathan Macias is a 89 year old male with a history of afib on Eliquis who was brought to the ED with dysarthria, aphasia and ataxia and noted to have an acute ischemic stroke of the RIGHT cerebellum. He is admitted for further evaluation and management.      Acute Ischemic stroke of RIGHT cerebellum  - TTE with normal EF, indeterminate diastolic function and neg bubble.  - Expressive aphasia (persistent), not a candidate for stroke intervention.  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 No evidence of malignancy on screen.   - Exam per neuro; mild dysarthria, decreased JAGDISH in RUE and RLE, ataxia with finger to nose and heel to shin with RUE and RLE. normal on the left  - dysarthria and ataxia improving, Strength intact. Taking po.   - not needing zyprexa overnight.   - sitter since fall on 8/6  - head ct post fall stable.   - Continue PTA statin. Cont PtA dose as LDL 40   - Cont PTA BP meds, eliquis  - Secondary stroke prevention with apixaban + ASA 81 mg daily x90 days, then just apixaban after that.   - neurochecks q4H   - PT/OT/SLP recommending TCU  - Long term goals < 130/80, LDL goal <70, A1c goal <7  - Follow up with general neurology in 6-8 weeks   - will repeat CTH non contrast due to ongoing delirium.       Unwitnessed fall 8/6  No neuro changes. No change in status prefall  Bed alarm not on or not working  Pt is ataxic from stroke  No injury. Hb stable  Bun.cr elevated, give fluid bolus and maint fluids  -Cr better with fluids  -stable neuro checks      HERNAN.   prerenal  Hb stable.   Held ace inh and hydrochlorothiazide  Given 500cc fluid bolus and IVF,now off IVF  Cr at baseline and BUN improving   Discontinue hydrochlorothiazide in the future     Atrial fibrillation  - continue BB and  apixaban.     DMII. A1C is 7.1.   range today range.   - hold metformin while inpatient  - sliding scale insulin  - added prandial aspart --> increased to 1 unit per 10 gram carbs 8/6, adjust as needed.      Hypertension  Good control  - Resumed PTA meds.  - Prn hydralazine.  - discontinue hydrochlorothiazide and resume lisinopril given HERNAN after discharge  -consider dose reduction of lisinopril    Cough  Sore throat:  No infection seen on CT chest.Neg for strep AG.  Afebrile.   - Cepacol. Prn.  - Prn antitussives.  - IS.     Delirium  Probable underlyng cognitive issues.  Wife reports that the patient has been having issues with short-term memories for about a year (forgetting keys and groceries etc).  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 Needed sitter started.   -needed prn zyprex aon 8/2 and 8/3.   Impulsive. Pulled out iv, pulled at catheter.   8/4 No agitation, cooperative. Remains impulsive at times.   ucx ngtd  Cefuroxime for possible uti- stopped given ucx ngt   -add qpm meletonin  - Continue 1:1.  - Delirium bundle, decrease frequency of vitals and telemetry  - Feliz as below for retention.  - Formal cognitive testing as outpatient.  - continue zyprexa to 5mg q AM and increase bedtime to 20 mg and 5mg q 6 hrs prn for agitation       Prostatomegaly on CT (unsure if new but was on flomax PTA).  Urinary retention causing frequent cath.  Hx of tx for bph and LUTS for 2 years with flomax  UA 8/3: 37 wbc, 176 rbc  - Indewelling feliz.  - Continue flomax.  - Bladder scan given confusion.  - Urology consult. Seen 8/3, rec feliz and consider voiding trial in several days. Cont flomax. CT scan reviewed and agree with radiology interpretation . No indication for urgent urologic intervention. Urology signed off  - follow up ucx shows no growth  - consider voiding trial in 2-3 days, if discharge with feliz outpatient urology follow up  [facility]rology or at [facility] urology  - pulled at  "catheter. Some blood in feliz. No clots. RN to flush, follow closely.   -8/5, still with bloody urine, ucx ngtd.    - 8/10 UA abnormal, UCx negative, discontinue empiric iv ceftriaxone, keep penile tip around feliz catheter clean.        Pulmonary nodule: 3mm, accidental.  - Repeat CT in 12 months.  - pcp follow up      DVT Prophylaxis: apixaban.    Code Status: Full Code     Disposition:   -- plan for TCU not clear as he continues to be in delirium requiring a sitter    Interval History   -- required prn zyprexa at 1 am  -- has mitt and intermittently agitated  -- some thicker secretions around feliz  Cathter tip    -Data reviewed today: I reviewed all new labs and imaging over the last 24 hours. I personally reviewed no images or EKG's today.    Physical Exam    , Blood pressure (!) 146/71, pulse 58, temperature 97.4  F (36.3  C), temperature source Axillary, resp. rate 16, height 1.715 m (5' 7.5\"), weight 84 kg (185 lb 3 oz), SpO2 99 %.  Vitals:    08/01/22 0944   Weight: 84 kg (185 lb 3 oz)     Vital Signs with Ranges  Temp:  [96.7  F (35.9  C)-97.4  F (36.3  C)] 97.4  F (36.3  C)  Pulse:  [58-89] 58  Resp:  [16-18] 16  BP: (124-146)/(71-75) 146/71  SpO2:  [95 %-100 %] 99 %  I/O's Last 24 hours  I/O last 3 completed shifts:  In: 26 [P.O.:20; I.V.:6]  Out: 1300 [Urine:1300]    Constitutional: calm, follows commands, dysarthric  Respiratory: Clear to auscultation bilaterally, no crackles or wheezing  Cardiovascular: Regular rate and rhythm, normal S1 and S2   GI: Normal bowel sounds, soft, non-distended, non-tender  Skin/Integumen: No rashes, no cyanosis, no edema  Other: Dysarthria, bilateral UE ataxia    Medications   All medications were reviewed.    - MEDICATION INSTRUCTIONS -       - MEDICATION INSTRUCTIONS -       - MEDICATION INSTRUCTIONS -         sodium chloride 0.9%  500 mL Intravenous Once     apixaban ANTICOAGULANT  5 mg Oral BID     aspirin  81 mg Oral Daily     atorvastatin  10 mg Oral or Feeding " Tube QPM     [Held by provider] lisinopril  20 mg Oral Daily    And     [Held by provider] hydrochlorothiazide  25 mg Oral Daily     insulin aspart   Subcutaneous TID w/meals     insulin aspart  1-3 Units Subcutaneous TID AC     insulin aspart  1-3 Units Subcutaneous At Bedtime     melatonin  1 mg Oral QPM     metoprolol succinate ER  25 mg Oral Daily     OLANZapine zydis  20 mg Oral At Bedtime     OLANZapine zydis  5 mg Oral QAM     sodium chloride (PF)  3 mL Intracatheter Q8H     tamsulosin  0.4 mg Oral Daily        Data   Recent Labs   Lab 08/14/22  1310 08/14/22  0747 08/14/22  0606 08/09/22  0812 08/09/22  0729 08/08/22  1640 08/08/22  1044   WBC  --   --  7.8  --  7.5  --   --    HGB  --   --  14.3  --  12.3*  --   --    MCV  --   --  92  --  93  --   --    PLT  --   --  147*  --  220  --   --    NA  --   --  137  --  138  --  137   POTASSIUM  --   --  4.8  --  4.4  --  4.4   CHLORIDE  --   --  106  --  108  --  107   CO2  --   --  25  --  25  --  24   BUN  --   --  43*  --  29  --  35*   CR  --   --  1.05  --  0.88  --  0.96   ANIONGAP  --   --  6  --  5  --  6   MAURICIO  --   --  8.8  --  8.6  --  8.5   * 195* 171*   < > 175*   < > 237*    < > = values in this interval not displayed.       Recent Results (from the past 24 hour(s))   CT Head w/o Contrast    Narrative    EXAM: CT HEAD W/O CONTRAST  LOCATION: [facility]  DATE/TIME: 8/13/2022 4:31 PM    INDICATION: stroke, ongoing deilirum, on anticoagulation  COMPARISON: 08/06/2022.  TECHNIQUE: Routine CT Head without IV contrast. Multiplanar reformats. Dose reduction techniques were used.    FINDINGS:  INTRACRANIAL CONTENTS: No intracranial hemorrhage, extraaxial collection, or mass effect.  Further evolution of the subacute right cerebellar infarct. No new area suggestive of acute ischemia by CT. There is diffuse scattered low-attenuation within the   periventricular and subcortical white matter consistent with diffuse small  vessel ischemic disease. Ventricular system, basal cisterns and the cortical sulci are consistent with diffuse volume loss.     VISUALIZED ORBITS/SINUSES/MASTOIDS: No intraorbital abnormality. Large mucous retention cyst left maxillary sinus. No middle ear or mastoid effusion.    BONES/SOFT TISSUES: No acute abnormality.      Impression    IMPRESSION:  1.  Continuing evolution right cerebellar subacute infarct.  2.  No CT evidence of hemorrhage, midline shift or focal area suggestive of acute infarct.  3.  Diffuse age related changes.       Jules Welch MD  Text Page  (7am to 6pm)

## 2022-08-14 NOTE — PROGRESS NOTES
Shift unremarkable.  VSS and neuro checks largely stable, although pt fluctuates in cooperation and involvement with the assessment; pt has long arm sitter to assist with safety.  Bed alarm in place for safety and pt makes attempts to self transfer with no appropriate call light use this shift.  Pt denies pain this shift.  Pt continues with a loose nonproductive cough, O2 remains stable on room air.   Pt continues with feliz catheter in place for retention, voiding adequate amounts this shift.     Writer completed a foot assessment on pt after noting the scabbed intact areas with reddened periwound skin.  The scabbed areas are noted to the dorsal aspect of his right first toe (2 scabs), the dorsal aspect of his right second toe (1 scab), the dorsal aspect of the right third toe (2 scabs), and the dorsal aspect of the left second toe (1 scab). These scabbed areas show no nonverbal indications of infection, pain, or drainage; they are baseline temperature for the pt.  Pt also noted to have boggy heels, writer elevated heels on a pillow in bed, pt noted to move himself out of position independently.  Pt noted to also have a new open fissure area between right third and fourth toes.  Area measures 1.0 x 0.3 x 0.1 cm.  Wound base is moist pink/red.  Wound margins are attached and regular, as well as macerated.  Periwound skin is pink/macerated.  Pt denies pain to the area, scant amount of serous drainage noted, no increased warmth or indications of infection noted.  Pt noted to also have a new open area to the dorsal aspect of the right fourth toe.  Area measures 0.4 x 0.4 x 0.1 cm.  Wound base is moist pink.  Wound margins are attached and regular.  Periwound skin is pink but baseline temperature for the pt.  Pt denies pain to the area, scant amount of serous drainage noted, no increased warmth or indications of infection noted.  Writer ordered pulsate mattress, and floated heels.  Recommending blanket cradle to  oncoming RN if it is available.  Writer cleansed and dried the areas to the right foot and placed gauze between toes with roll gauze around toes loosely with surginetting over top of both to secure them in place without causing more pressure to areas on dorsal aspect of toes.

## 2022-08-15 ENCOUNTER — APPOINTMENT (OUTPATIENT)
Dept: PHYSICAL THERAPY | Facility: CLINIC | Age: 87
DRG: 065 | End: 2022-08-15
Payer: COMMERCIAL

## 2022-08-15 ENCOUNTER — APPOINTMENT (OUTPATIENT)
Dept: OCCUPATIONAL THERAPY | Facility: CLINIC | Age: 87
DRG: 065 | End: 2022-08-15
Payer: COMMERCIAL

## 2022-08-15 ENCOUNTER — APPOINTMENT (OUTPATIENT)
Dept: SPEECH THERAPY | Facility: CLINIC | Age: 87
DRG: 065 | End: 2022-08-15
Payer: COMMERCIAL

## 2022-08-15 LAB
GLUCOSE BLDC GLUCOMTR-MCNC: 139 MG/DL (ref 70–99)
GLUCOSE BLDC GLUCOMTR-MCNC: 169 MG/DL (ref 70–99)
GLUCOSE BLDC GLUCOMTR-MCNC: 177 MG/DL (ref 70–99)
GLUCOSE BLDC GLUCOMTR-MCNC: 188 MG/DL (ref 70–99)
GLUCOSE BLDC GLUCOMTR-MCNC: 192 MG/DL (ref 70–99)

## 2022-08-15 PROCEDURE — 120N000001 HC R&B MED SURG/OB

## 2022-08-15 PROCEDURE — 97530 THERAPEUTIC ACTIVITIES: CPT | Mod: GP

## 2022-08-15 PROCEDURE — 250N000013 HC RX MED GY IP 250 OP 250 PS 637: Performed by: HOSPITALIST

## 2022-08-15 PROCEDURE — 250N000013 HC RX MED GY IP 250 OP 250 PS 637: Performed by: STUDENT IN AN ORGANIZED HEALTH CARE EDUCATION/TRAINING PROGRAM

## 2022-08-15 PROCEDURE — 97112 NEUROMUSCULAR REEDUCATION: CPT | Mod: GO

## 2022-08-15 PROCEDURE — 250N000013 HC RX MED GY IP 250 OP 250 PS 637: Performed by: INTERNAL MEDICINE

## 2022-08-15 PROCEDURE — 92526 ORAL FUNCTION THERAPY: CPT | Mod: GN | Performed by: SPEECH-LANGUAGE PATHOLOGIST

## 2022-08-15 PROCEDURE — 97530 THERAPEUTIC ACTIVITIES: CPT | Mod: GO

## 2022-08-15 PROCEDURE — 99232 SBSQ HOSP IP/OBS MODERATE 35: CPT | Performed by: INTERNAL MEDICINE

## 2022-08-15 RX ADMIN — APIXABAN 5 MG: 5 TABLET, FILM COATED ORAL at 09:18

## 2022-08-15 RX ADMIN — INSULIN ASPART 5 UNITS: 100 INJECTION, SOLUTION INTRAVENOUS; SUBCUTANEOUS at 12:05

## 2022-08-15 RX ADMIN — OLANZAPINE 20 MG: 10 TABLET, ORALLY DISINTEGRATING ORAL at 21:13

## 2022-08-15 RX ADMIN — APIXABAN 5 MG: 5 TABLET, FILM COATED ORAL at 20:01

## 2022-08-15 RX ADMIN — Medication 1 MG: at 19:47

## 2022-08-15 RX ADMIN — OLANZAPINE 5 MG: 5 TABLET, ORALLY DISINTEGRATING ORAL at 09:18

## 2022-08-15 RX ADMIN — INSULIN ASPART 2 UNITS: 100 INJECTION, SOLUTION INTRAVENOUS; SUBCUTANEOUS at 19:01

## 2022-08-15 RX ADMIN — TAMSULOSIN HYDROCHLORIDE 0.4 MG: 0.4 CAPSULE ORAL at 09:18

## 2022-08-15 RX ADMIN — ASPIRIN 81 MG CHEWABLE TABLET 81 MG: 81 TABLET CHEWABLE at 09:18

## 2022-08-15 RX ADMIN — INSULIN ASPART 5 UNITS: 100 INJECTION, SOLUTION INTRAVENOUS; SUBCUTANEOUS at 09:19

## 2022-08-15 RX ADMIN — METOPROLOL SUCCINATE 25 MG: 25 TABLET, EXTENDED RELEASE ORAL at 09:18

## 2022-08-15 RX ADMIN — ATORVASTATIN CALCIUM 10 MG: 10 TABLET, FILM COATED ORAL at 19:47

## 2022-08-15 ASSESSMENT — ACTIVITIES OF DAILY LIVING (ADL)
ADLS_ACUITY_SCORE: 48

## 2022-08-15 NOTE — CONSULTS
Triage and Transition - Consult and Liaison     Nathan Macias  August 15, 2022    Psychiatry consult acknowledged.     Spoke with RN whom reports they are unsure why the consult was placed and that patient appears managed and doing well.     Consult completed as there appears to not be a need at this time.     Please reconsult if matters change.     JARVIS RUIZ MSW, [facility]  Triage and Transition - Consult and Liaison   [phone]

## 2022-08-15 NOTE — PROGRESS NOTES
[facility]  Hospitalist Progress Note  Jules Welch MD  08/15/2022    Assessment & Plan   Nathan Macias is a 89 year old male with a history of afib on Eliquis who was brought to the ED with dysarthria, aphasia and ataxia and noted to have an acute ischemic stroke of the RIGHT cerebellum. He is admitted for further evaluation and management.      Acute Ischemic stroke of RIGHT cerebellum  - TTE with normal EF, indeterminate diastolic function and neg bubble.  - Expressive aphasia (persistent), not a candidate for stroke intervention.  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 No evidence of malignancy on screen.   - Exam per neuro; mild dysarthria, decreased JAGDISH in RUE and RLE, ataxia with finger to nose and heel to shin with RUE and RLE. normal on the left  - dysarthria and ataxia improving, Strength intact. Taking po.   - not needing zyprexa overnight.   - sitter since fall on 8/6  - head ct post fall stable.   - Continue PTA statin. Cont PtA dose as LDL 40   - Cont PTA BP meds, eliquis  - Secondary stroke prevention with apixaban + ASA 81 mg daily x90 days, then just apixaban after that.   - neurochecks q4H   - PT/OT/SLP recommending TCU  - Long term goals < 130/80, LDL goal <70, A1c goal <7  - Follow up with general neurology in 6-8 weeks   -  repeat CTH non contrast showed continued evolution of right cerebellar infarction.       Unwitnessed fall 8/6  No neuro changes. No change in status prefall  Bed alarm not on or not working  Pt is ataxic from stroke  No injury. Hb stable  Bun.cr elevated, give fluid bolus and maint fluids  -Cr better with fluids  -stable neuro checks      HERNAN.   prerenal  Hb stable.   Held ace inh and hydrochlorothiazide  Given 500cc fluid bolus and IVF,now off IVF  Cr at baseline and BUN improving   Discontinue hydrochlorothiazide in the future     Atrial fibrillation  -  continue BB and apixaban.     DMII. A1C is 7.1.   range today range.   - hold metformin while inpatient  - sliding scale insulin  - added prandial aspart --> increased to 1 unit per 10 gram carbs 8/6, adjust as needed.      Hypertension  Good control  - Resumed PTA meds.  - Prn hydralazine.  - discontinue hydrochlorothiazide and resume lisinopril given HERNAN after discharge  -consider dose reduction of lisinopril    Cough  Sore throat:  No infection seen on CT chest.Neg for strep AG.  Afebrile.   - Cepacol. Prn.  - Prn antitussives.  - IS.     Delirium  Probable underlyng cognitive issues.  Wife reports that the patient has been having issues with short-term memories for about a year (forgetting keys and groceries etc).  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 Needed sitter started.   -needed prn zyprex aon 8/2 and 8/3.   Impulsive. Pulled out iv, pulled at catheter.   8/4 No agitation, cooperative. Remains impulsive at times.   ucx ngtd  Cefuroxime for possible uti- stopped given ucx ngt   -add qpm meletonin  - Continue 1:1.  - Delirium bundle, decrease frequency of vitals and telemetry  - Feliz as below for retention.  - Formal cognitive testing as outpatient.  - continue zyprexa to 5mg q AM and increase bedtime to 20 mg and 5mg q 6 hrs prn for agitation       Prostatomegaly on CT (unsure if new but was on flomax PTA).  Urinary retention causing frequent cath.  Hx of tx for bph and LUTS for 2 years with flomax  UA 8/3: 37 wbc, 176 rbc  - Indewelling feliz.  - Continue flomax.  - Bladder scan given confusion.  - Urology consult. Seen 8/3, rec feliz and consider voiding trial in several days. Cont flomax. CT scan reviewed and agree with radiology interpretation . No indication for urgent urologic intervention. Urology signed off  - follow up ucx shows no growth  - consider voiding trial in 2-3 days, if discharge with feliz outpatient urology follow up  [facility] urology or at [facility]  "urology  - pulled at catheter. Some blood in feliz. No clots. RN to flush, follow closely.   -8/5, still with bloody urine, ucx ngtd.    - 8/10 UA abnormal, UCx negative, discontinue empiric iv ceftriaxone, keep penile tip around feliz catheter clean.        Pulmonary nodule: 3mm, accidental.  - Repeat CT in 12 months.  - pcp follow up      DVT Prophylaxis: apixaban.    Code Status: Full Code     Disposition:   -- plan for TCU not clear as he continues to be in delirium requiring a sitter    Interval History   -- did not require any prn zyprexa for agitation.   -- updated wife this AM  -- has mitts due to patient pulling on feliz    -Data reviewed today: I reviewed all new labs and imaging over the last 24 hours. I personally reviewed no images or EKG's today.    Physical Exam    , Blood pressure (!) 156/83, pulse 98, temperature 97.6  F (36.4  C), temperature source Axillary, resp. rate 16, height 1.715 m (5' 7.5\"), weight 84 kg (185 lb 3 oz), SpO2 97 %.  Vitals:    08/01/22 0944   Weight: 84 kg (185 lb 3 oz)     Vital Signs with Ranges  Temp:  [97.5  F (36.4  C)-98.7  F (37.1  C)] 97.6  F (36.4  C)  Pulse:  [] 98  Resp:  [16-20] 16  BP: (133-156)/(74-93) 156/83  SpO2:  [94 %-97 %] 97 %  I/O's Last 24 hours  I/O last 3 completed shifts:  In: 300 [P.O.:300]  Out: 950 [Urine:950]    Constitutional: calm, follows commands, dysarthric  Respiratory: Clear to auscultation bilaterally, no crackles or wheezing  Cardiovascular: Regular rate and rhythm, normal S1 and S2   GI: Normal bowel sounds, soft, non-distended, non-tender  Skin/Integumen: No rashes, no cyanosis, no edema  Other: Dysarthria, bilateral UE ataxia    Medications   All medications were reviewed.    - MEDICATION INSTRUCTIONS -       - MEDICATION INSTRUCTIONS -       - MEDICATION INSTRUCTIONS -         sodium chloride 0.9%  500 mL Intravenous Once     apixaban ANTICOAGULANT  5 mg Oral BID     aspirin  81 mg Oral Daily     atorvastatin  10 mg Oral or " Feeding Tube QPM     [Held by provider] lisinopril  20 mg Oral Daily    And     [Held by provider] hydrochlorothiazide  25 mg Oral Daily     insulin aspart   Subcutaneous TID w/meals     insulin aspart  1-3 Units Subcutaneous TID AC     insulin aspart  1-3 Units Subcutaneous At Bedtime     melatonin  1 mg Oral QPM     metoprolol succinate ER  25 mg Oral Daily     OLANZapine zydis  20 mg Oral At Bedtime     OLANZapine zydis  5 mg Oral QAM     sodium chloride (PF)  3 mL Intracatheter Q8H     tamsulosin  0.4 mg Oral Daily        Data   Recent Labs   Lab 08/15/22  1141 08/15/22  0754 08/15/22  0151 08/14/22  0747 08/14/22  0606 08/09/22  0812 08/09/22  0729   WBC  --   --   --   --  7.8  --  7.5   HGB  --   --   --   --  14.3  --  12.3*   MCV  --   --   --   --  92  --  93   PLT  --   --   --   --  147*  --  220   NA  --   --   --   --  137  --  138   POTASSIUM  --   --   --   --  4.8  --  4.4   CHLORIDE  --   --   --   --  106  --  108   CO2  --   --   --   --  25  --  25   BUN  --   --   --   --  43*  --  29   CR  --   --   --   --  1.05  --  0.88   ANIONGAP  --   --   --   --  6  --  5   MAURICIO  --   --   --   --  8.8  --  8.6   * 177* 169*   < > 171*   < > 175*    < > = values in this interval not displayed.       No results found for this or any previous visit (from the past 24 hour(s)).    Jules Welch MD  Text Page  (7am to 6pm)

## 2022-08-15 NOTE — PROGRESS NOTES
CLINICAL NUTRITION SERVICES - REASSESSMENT NOTE      Recommendations Ordered by Registered Dietitian (RD):   Trial Gelatein+ supplement with meals    Future/Additional Recommendations:   Encourage oral intakes  Diet per SLP  Continue meal ordering assistance    Malnutrition:   % Weight Loss:  None noted  % Intake:  </= 50% for >/= 5 days (severe malnutrition)  Subcutaneous Fat Loss:  None observed per RD 8/8  Muscle Loss:  None observed per RD 8/8, suspect some component of age related sarcopenia though this does not meet malnutrition criteria   Fluid Retention:  None noted    Malnutrition Diagnosis: Patient does not meet two of the above criteria necessary for diagnosing malnutrition, but is at risk for malnutrition        EVALUATION OF PROGRESS TOWARD GOALS   Diet:  Minced and Moist (level 5) + Moderately Thick Liquids (level 3)  Moderate Consistent Carb (60 grams/meal)  Room Service w/ Assist for meal ordering     Intake/Tolerance:   Variable intake 0-100% though most meals documented at </=25% per chart review.   Receiving 3 good meals/day with meal ordering assistance.   Attempted to visit at bedside but patient was sleeping, restraints in place - did not wake.       ASSESSED NUTRITION NEEDS:  Dosing Weight:  74 kg (adjusted using 8/1 weight and IBW 68.6 kg)  Estimated Energy Needs: 3122-7480 kcal (25-30 kcal/kg)  Justification: maintenance per weight status  Estimated Protein Needs: 75-90+ grams (1-1.2+ g/kg)  Justification: preservation of lean body mass   Estimated Fluid Needs: 1 mL/Kcal for maintenance or per provider pending fluid status      NEW FINDINGS:   Chart reviewed -   A/O to self only     Labs:  Recent Labs   Lab 08/15/22  1141 08/15/22  0754 08/15/22  0151 08/14/22  2143 08/14/22  1748 08/14/22  1310   * 177* 169* 150* 161* 223*     Meds: novolog 1 unit: 10 g CHO with meals, low sliding scale insulin for BG correction  Last BM x 1 on 8/12    No new weight since admit - 84 kg (8/1). Had  been stable over the past 6 months prior to admission.     Previous Goals:   No previous goals to evaluate    Previous Nutrition Diagnosis:   No nutrition diagnosis at this time.  Evaluation: Declining      CURRENT NUTRITION DIAGNOSIS  Inadequate oral intake related to confusion, restricted diet as evidenced by consuming </= 25% at most meals per chart review     INTERVENTIONS  Recommendations / Nutrition Prescription  Trial Gelatein+ supplement with meals   Encourage oral intakes  Diet per SLP  Continue meal ordering assistance     Implementation  Medical Food Supplement - ordered as above     Goals  Patient will consistently consume >/= 50% meals + supplements TID      MONITORING AND EVALUATION:  Progress towards goals will be monitored and evaluated per protocol and Practice Guidelines      Ana Tena RD, LD

## 2022-08-16 ENCOUNTER — APPOINTMENT (OUTPATIENT)
Dept: SPEECH THERAPY | Facility: CLINIC | Age: 87
DRG: 065 | End: 2022-08-16
Payer: COMMERCIAL

## 2022-08-16 ENCOUNTER — APPOINTMENT (OUTPATIENT)
Dept: PHYSICAL THERAPY | Facility: CLINIC | Age: 87
DRG: 065 | End: 2022-08-16
Payer: COMMERCIAL

## 2022-08-16 LAB
ALBUMIN UR-MCNC: 100 MG/DL
APPEARANCE UR: ABNORMAL
BILIRUB UR QL STRIP: NEGATIVE
COLOR UR AUTO: ABNORMAL
ERYTHROCYTE [DISTWIDTH] IN BLOOD BY AUTOMATED COUNT: 13.2 % (ref 10–15)
GLUCOSE BLDC GLUCOMTR-MCNC: 139 MG/DL (ref 70–99)
GLUCOSE BLDC GLUCOMTR-MCNC: 166 MG/DL (ref 70–99)
GLUCOSE BLDC GLUCOMTR-MCNC: 171 MG/DL (ref 70–99)
GLUCOSE BLDC GLUCOMTR-MCNC: 177 MG/DL (ref 70–99)
GLUCOSE BLDC GLUCOMTR-MCNC: 187 MG/DL (ref 70–99)
GLUCOSE BLDC GLUCOMTR-MCNC: 223 MG/DL (ref 70–99)
GLUCOSE UR STRIP-MCNC: NEGATIVE MG/DL
HCT VFR BLD AUTO: 44 % (ref 40–53)
HGB BLD-MCNC: 14.5 G/DL (ref 13.3–17.7)
HGB UR QL STRIP: ABNORMAL
KETONES UR STRIP-MCNC: ABNORMAL MG/DL
LEUKOCYTE ESTERASE UR QL STRIP: ABNORMAL
MCH RBC QN AUTO: 31 PG (ref 26.5–33)
MCHC RBC AUTO-ENTMCNC: 33 G/DL (ref 31.5–36.5)
MCV RBC AUTO: 94 FL (ref 78–100)
NITRATE UR QL: NEGATIVE
PH UR STRIP: 6 [PH] (ref 5–7)
PLATELET # BLD AUTO: 233 10E3/UL (ref 150–450)
RBC # BLD AUTO: 4.68 10E6/UL (ref 4.4–5.9)
RBC URINE: >182 /HPF
SP GR UR STRIP: 1.03 (ref 1–1.03)
UROBILINOGEN UR STRIP-MCNC: NORMAL MG/DL
WBC # BLD AUTO: 6.2 10E3/UL (ref 4–11)
WBC URINE: 10 /HPF

## 2022-08-16 PROCEDURE — 250N000013 HC RX MED GY IP 250 OP 250 PS 637: Performed by: HOSPITALIST

## 2022-08-16 PROCEDURE — 120N000001 HC R&B MED SURG/OB

## 2022-08-16 PROCEDURE — 81001 URINALYSIS AUTO W/SCOPE: CPT | Performed by: INTERNAL MEDICINE

## 2022-08-16 PROCEDURE — 250N000013 HC RX MED GY IP 250 OP 250 PS 637: Performed by: INTERNAL MEDICINE

## 2022-08-16 PROCEDURE — 250N000013 HC RX MED GY IP 250 OP 250 PS 637: Performed by: STUDENT IN AN ORGANIZED HEALTH CARE EDUCATION/TRAINING PROGRAM

## 2022-08-16 PROCEDURE — 99232 SBSQ HOSP IP/OBS MODERATE 35: CPT | Performed by: INTERNAL MEDICINE

## 2022-08-16 PROCEDURE — 36415 COLL VENOUS BLD VENIPUNCTURE: CPT | Performed by: INTERNAL MEDICINE

## 2022-08-16 PROCEDURE — 92507 TX SP LANG VOICE COMM INDIV: CPT | Mod: GN

## 2022-08-16 PROCEDURE — 97110 THERAPEUTIC EXERCISES: CPT | Mod: GP | Performed by: PHYSICAL THERAPIST

## 2022-08-16 PROCEDURE — 85014 HEMATOCRIT: CPT | Performed by: INTERNAL MEDICINE

## 2022-08-16 RX ADMIN — INSULIN ASPART 8 UNITS: 100 INJECTION, SOLUTION INTRAVENOUS; SUBCUTANEOUS at 09:17

## 2022-08-16 RX ADMIN — APIXABAN 5 MG: 5 TABLET, FILM COATED ORAL at 20:16

## 2022-08-16 RX ADMIN — TAMSULOSIN HYDROCHLORIDE 0.4 MG: 0.4 CAPSULE ORAL at 09:18

## 2022-08-16 RX ADMIN — OLANZAPINE 20 MG: 10 TABLET, ORALLY DISINTEGRATING ORAL at 22:02

## 2022-08-16 RX ADMIN — INSULIN ASPART 4 UNITS: 100 INJECTION, SOLUTION INTRAVENOUS; SUBCUTANEOUS at 11:42

## 2022-08-16 RX ADMIN — ATORVASTATIN CALCIUM 10 MG: 10 TABLET, FILM COATED ORAL at 20:16

## 2022-08-16 RX ADMIN — METOPROLOL SUCCINATE 25 MG: 25 TABLET, EXTENDED RELEASE ORAL at 09:18

## 2022-08-16 RX ADMIN — OLANZAPINE 5 MG: 5 TABLET, ORALLY DISINTEGRATING ORAL at 09:18

## 2022-08-16 RX ADMIN — ASPIRIN 81 MG CHEWABLE TABLET 81 MG: 81 TABLET CHEWABLE at 09:18

## 2022-08-16 RX ADMIN — ACETAMINOPHEN 1000 MG: 500 TABLET, FILM COATED ORAL at 01:41

## 2022-08-16 RX ADMIN — APIXABAN 5 MG: 5 TABLET, FILM COATED ORAL at 09:18

## 2022-08-16 ASSESSMENT — ACTIVITIES OF DAILY LIVING (ADL)
ADLS_ACUITY_SCORE: 48
ADLS_ACUITY_SCORE: 48
ADLS_ACUITY_SCORE: 49
ADLS_ACUITY_SCORE: 48
ADLS_ACUITY_SCORE: 48
ADLS_ACUITY_SCORE: 52
ADLS_ACUITY_SCORE: 48
ADLS_ACUITY_SCORE: 48
ADLS_ACUITY_SCORE: 49
ADLS_ACUITY_SCORE: 49
ADLS_ACUITY_SCORE: 48
ADLS_ACUITY_SCORE: 48

## 2022-08-16 NOTE — PROGRESS NOTES
Care Management Follow Up    Length of Stay (days): 15    Expected Discharge Date: 08/17/2022     Concerns to be Addressed:       Patient plan of care discussed at interdisciplinary rounds: Yes    Anticipated Discharge Disposition: TCU at [facility]   Anticipated Discharge Services: Transportation Services    Patient/family educated on Medicare website which has current facility and service quality ratings:    Education Provided on the Discharge Plan:  Yes. KIMBERLY spoke with pt's wife today about tentative discharge to [facility] tomorrow at 11:00am.    Patient/Family in Agreement with the Plan: yes  Additional Information:  KIMBERLY called admissions at [facility] and spoke with Elysia regarding tentative discharge plan for tomorrow and she will return call to make further arrangements as pt is not cleared to admit tomorrow due to mitts being used yesterday. This is considered a restraint and pt has to be free of sitters or restraints for period of 48 hours prior to admission. KIMBERLY will follow up with Elysia to determine if they can admit pt Thursday if pt remains sitter and restraint free.    HELEN Hall  [facility]  Care Transitions  [phone]

## 2022-08-16 NOTE — PROGRESS NOTES
[facility]  Hospitalist Progress Note  Jules Welch MD  08/16/2022    Assessment & Plan   Nathan Macias is a 89 year old male with a history of afib on Eliquis who was brought to the ED with dysarthria, aphasia and ataxia and noted to have an acute ischemic stroke of the RIGHT cerebellum. He is admitted for further evaluation and management.      Acute Ischemic stroke of RIGHT cerebellum  - TTE with normal EF, indeterminate diastolic function and neg bubble.  - Expressive aphasia (persistent), not a candidate for stroke intervention.  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 No evidence of malignancy on screen.   - Exam per neuro;  dysarthria, decreased JAGDISH in RUE and RLE, ataxia with finger to nose and heel to shin with RUE and RLE. normal on the left  - dysarthria and ataxia with very slow improvement, Strength intact. Taking po.   - sitter since fall on 8/6  - head ct post fall stable.   - Continue PTA statin. Cont PtA dose as LDL 40   - Cont PTA BP meds, eliquis  - Secondary stroke prevention with apixaban + ASA 81 mg daily x90 days, then just apixaban after that.   - neurochecks q4H   - PT/OT/SLP recommending TCU  - Long term goals < 130/80, LDL goal <70, A1c goal <7  - Follow up with general neurology in 6-8 weeks   - repeat CTH non contrast showed continued evolution of right cerebellar infarction       Unwitnessed fall 8/6  No neuro changes. No change in status prefall  Bed alarm not on or not working  Pt is ataxic from stroke  No injury. Hb stable  Bun.cr elevated, give fluid bolus and maint fluids  -Cr better with fluids  -stable neuro checks      HERNAN.   prerenal  Hb stable.   Held ace inh and hydrochlorothiazide  Given 500cc fluid bolus and IVF,now off IVF  Cr at baseline   Discontinue hydrochlorothiazide in the future     Atrial fibrillation  - continue BB and apixaban.     DMII. A1C is  7.1.   range today range.   - hold metformin while inpatient  - sliding scale insulin  - added prandial aspart --> increased to 1 unit per 10 gram carbs 8/6, adjust as needed.      Hypertension  Good control  - Resumed PTA meds.  - Prn hydralazine.  - discontinue hydrochlorothiazide and resume lisinopril given HERNAN after discharge  - consider dose reduction of lisinopril  - BP stable on metoprolol 25 mg daily.    Cough  Sore throat:  No infection seen on CT chest.Neg for strep AG.  Afebrile.   - Cepacol. Prn.  - Prn antitussives.  - IS.     Delirium  Probable underlyng cognitive issues.  Wife reports that the patient has been having issues with short-term memories for about a year (forgetting keys and groceries etc).  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 Needed sitter started.   -needed prn zyprex aon 8/2 and 8/3.   Impulsive. Pulled out iv, pulled at catheter.   8/4 No agitation, cooperative. Remains impulsive at times.   ucx ngtd  Cefuroxime for possible uti- stopped given ucx ngt   -add qpm meletonin  - Delirium bundle, decrease frequency of vitals and telemetry  - Feliz as below for retention.  - Formal cognitive testing as outpatient.  - continue zyprexa to 5mg q AM and bedtime to 20 mg and 5mg q 6 hrs prn for agitation       Prostatomegaly on CT (unsure if new but was on flomax PTA).  Urinary retention causing frequent cath.  Feliz traums 8/15  Hx of tx for bph and LUTS for 2 years with flomax  UA 8/3: 37 wbc, 176 rbc  - Indewelling feliz.  - Continue flomax.  - Bladder scan given confusion.  - Urology consult. Seen 8/3, rec feliz and consider voiding trial in several days. Cont flomax. CT scan reviewed and agree with radiology interpretation . No indication for urgent urologic intervention. Urology signed off  - follow up ucx shows no growth  - consider voiding trial in 2-3 days, if discharge with feliz outpatient urology follow up  [facility]rology or at [facility] urology  -  "pulled at catheter. Some blood in feliz. No clots. RN to flush, follow closely.   -8/5, still with bloody urine, ucx ngtd.    - 8/10 UA abnormal, UCx negative, discontinue empiric iv ceftriaxone, keep penile tip around feliz catheter clean.  - patient noted to have coke color urine, will check UA and hgb     Pulmonary nodule: 3mm, accidental.  - Repeat CT in 12 months.  - pcp follow up      DVT Prophylaxis: apixaban.    Code Status: Full Code     Disposition:   -- plan for TCU     Interval History   -- patient pulled fleiz out causing trauma, needed mitts placed back on  -- urine appears coca cola color  -- has mitts due to patient pulling on feliz  -- discussed with RN    -Data reviewed today: I reviewed all new labs and imaging over the last 24 hours. I personally reviewed no images or EKG's today.    Physical Exam    , Blood pressure 135/88, pulse 77, temperature 96.8  F (36  C), temperature source Axillary, resp. rate 20, height 1.715 m (5' 7.5\"), weight 84 kg (185 lb 3 oz), SpO2 96 %.  Vitals:    08/01/22 0944   Weight: 84 kg (185 lb 3 oz)     Vital Signs with Ranges  Temp:  [96.8  F (36  C)-97.6  F (36.4  C)] 96.8  F (36  C)  Pulse:  [50-91] 77  Resp:  [16-20] 20  BP: ()/(63-88) 135/88  SpO2:  [94 %-96 %] 96 %  I/O's Last 24 hours  I/O last 3 completed shifts:  In: 173 [P.O.:170; I.V.:3]  Out: 975 [Urine:975]    Constitutional:  dysarthric  Respiratory: Clear to auscultation bilaterally, no crackles or wheezing  Cardiovascular: Regular rate and rhythm, normal S1 and S2   GI: Normal bowel sounds, soft, non-distended, non-tender  Skin/Integumen: No rashes, no cyanosis, no edema  Other: Dysarthria, bilateral UE ataxia    Medications   All medications were reviewed.    - MEDICATION INSTRUCTIONS -       - MEDICATION INSTRUCTIONS -       - MEDICATION INSTRUCTIONS -         apixaban ANTICOAGULANT  5 mg Oral BID     aspirin  81 mg Oral Daily     atorvastatin  10 mg Oral or Feeding Tube QPM     [Held by provider] " lisinopril  20 mg Oral Daily    And     [Held by provider] hydrochlorothiazide  25 mg Oral Daily     insulin aspart   Subcutaneous TID w/meals     insulin aspart  1-3 Units Subcutaneous TID AC     insulin aspart  1-3 Units Subcutaneous At Bedtime     melatonin  1 mg Oral QPM     metoprolol succinate ER  25 mg Oral Daily     OLANZapine zydis  20 mg Oral At Bedtime     OLANZapine zydis  5 mg Oral QAM     sodium chloride (PF)  3 mL Intracatheter Q8H     tamsulosin  0.4 mg Oral Daily        Data   Recent Labs   Lab 08/16/22  1137 08/16/22  0805 08/16/22  0152 08/14/22  0747 08/14/22  0606   WBC  --   --   --   --  7.8   HGB  --   --   --   --  14.3   MCV  --   --   --   --  92   PLT  --   --   --   --  147*   NA  --   --   --   --  137   POTASSIUM  --   --   --   --  4.8   CHLORIDE  --   --   --   --  106   CO2  --   --   --   --  25   BUN  --   --   --   --  43*   CR  --   --   --   --  1.05   ANIONGAP  --   --   --   --  6   MAURICIO  --   --   --   --  8.8   * 187* 166*   < > 171*    < > = values in this interval not displayed.       No results found for this or any previous visit (from the past 24 hour(s)).    Jules Welch MD  Text Page  (7am to 6pm)

## 2022-08-17 ENCOUNTER — APPOINTMENT (OUTPATIENT)
Dept: PHYSICAL THERAPY | Facility: CLINIC | Age: 87
DRG: 065 | End: 2022-08-17
Payer: COMMERCIAL

## 2022-08-17 ENCOUNTER — APPOINTMENT (OUTPATIENT)
Dept: CT IMAGING | Facility: CLINIC | Age: 87
DRG: 065 | End: 2022-08-17
Payer: COMMERCIAL

## 2022-08-17 LAB
ANION GAP SERPL CALCULATED.3IONS-SCNC: 3 MMOL/L (ref 3–14)
BUN SERPL-MCNC: 50 MG/DL (ref 7–30)
CALCIUM SERPL-MCNC: 8.9 MG/DL (ref 8.5–10.1)
CHLORIDE BLD-SCNC: 108 MMOL/L (ref 94–109)
CO2 SERPL-SCNC: 27 MMOL/L (ref 20–32)
CREAT SERPL-MCNC: 1.08 MG/DL (ref 0.66–1.25)
ERYTHROCYTE [DISTWIDTH] IN BLOOD BY AUTOMATED COUNT: 13 % (ref 10–15)
GFR SERPL CREATININE-BSD FRML MDRD: 66 ML/MIN/1.73M2
GLUCOSE BLD-MCNC: 213 MG/DL (ref 70–99)
GLUCOSE BLDC GLUCOMTR-MCNC: 166 MG/DL (ref 70–99)
GLUCOSE BLDC GLUCOMTR-MCNC: 171 MG/DL (ref 70–99)
GLUCOSE BLDC GLUCOMTR-MCNC: 177 MG/DL (ref 70–99)
GLUCOSE BLDC GLUCOMTR-MCNC: 207 MG/DL (ref 70–99)
HCT VFR BLD AUTO: 40.6 % (ref 40–53)
HGB BLD-MCNC: 13.3 G/DL (ref 13.3–17.7)
MCH RBC QN AUTO: 30.8 PG (ref 26.5–33)
MCHC RBC AUTO-ENTMCNC: 32.8 G/DL (ref 31.5–36.5)
MCV RBC AUTO: 94 FL (ref 78–100)
PLATELET # BLD AUTO: 295 10E3/UL (ref 150–450)
POTASSIUM BLD-SCNC: 4.7 MMOL/L (ref 3.4–5.3)
RBC # BLD AUTO: 4.32 10E6/UL (ref 4.4–5.9)
SODIUM SERPL-SCNC: 138 MMOL/L (ref 133–144)
WBC # BLD AUTO: 8.6 10E3/UL (ref 4–11)

## 2022-08-17 PROCEDURE — 120N000001 HC R&B MED SURG/OB

## 2022-08-17 PROCEDURE — 250N000013 HC RX MED GY IP 250 OP 250 PS 637: Performed by: HOSPITALIST

## 2022-08-17 PROCEDURE — 70450 CT HEAD/BRAIN W/O DYE: CPT

## 2022-08-17 PROCEDURE — 99232 SBSQ HOSP IP/OBS MODERATE 35: CPT | Performed by: INTERNAL MEDICINE

## 2022-08-17 PROCEDURE — 36415 COLL VENOUS BLD VENIPUNCTURE: CPT | Performed by: INTERNAL MEDICINE

## 2022-08-17 PROCEDURE — 85027 COMPLETE CBC AUTOMATED: CPT | Performed by: INTERNAL MEDICINE

## 2022-08-17 PROCEDURE — 80048 BASIC METABOLIC PNL TOTAL CA: CPT | Performed by: INTERNAL MEDICINE

## 2022-08-17 PROCEDURE — 250N000013 HC RX MED GY IP 250 OP 250 PS 637: Performed by: INTERNAL MEDICINE

## 2022-08-17 PROCEDURE — 250N000013 HC RX MED GY IP 250 OP 250 PS 637: Performed by: STUDENT IN AN ORGANIZED HEALTH CARE EDUCATION/TRAINING PROGRAM

## 2022-08-17 PROCEDURE — 97530 THERAPEUTIC ACTIVITIES: CPT | Mod: GP | Performed by: PHYSICAL THERAPIST

## 2022-08-17 PROCEDURE — 97110 THERAPEUTIC EXERCISES: CPT | Mod: GP | Performed by: PHYSICAL THERAPIST

## 2022-08-17 RX ORDER — LISINOPRIL 20 MG/1
20 TABLET ORAL DAILY
Status: DISCONTINUED | OUTPATIENT
Start: 2022-08-17 | End: 2022-08-30 | Stop reason: HOSPADM

## 2022-08-17 RX ORDER — SODIUM CHLORIDE 9 MG/ML
INJECTION, SOLUTION INTRAVENOUS CONTINUOUS
Status: DISCONTINUED | OUTPATIENT
Start: 2022-08-17 | End: 2022-08-18

## 2022-08-17 RX ADMIN — APIXABAN 5 MG: 5 TABLET, FILM COATED ORAL at 09:25

## 2022-08-17 RX ADMIN — LISINOPRIL 20 MG: 20 TABLET ORAL at 12:35

## 2022-08-17 RX ADMIN — OLANZAPINE 20 MG: 10 TABLET, ORALLY DISINTEGRATING ORAL at 21:34

## 2022-08-17 RX ADMIN — APIXABAN 5 MG: 5 TABLET, FILM COATED ORAL at 20:01

## 2022-08-17 RX ADMIN — INSULIN ASPART 8 UNITS: 100 INJECTION, SOLUTION INTRAVENOUS; SUBCUTANEOUS at 12:35

## 2022-08-17 RX ADMIN — ATORVASTATIN CALCIUM 10 MG: 10 TABLET, FILM COATED ORAL at 19:55

## 2022-08-17 RX ADMIN — TAMSULOSIN HYDROCHLORIDE 0.4 MG: 0.4 CAPSULE ORAL at 09:24

## 2022-08-17 RX ADMIN — INSULIN ASPART 8 UNITS: 100 INJECTION, SOLUTION INTRAVENOUS; SUBCUTANEOUS at 09:23

## 2022-08-17 RX ADMIN — OLANZAPINE 5 MG: 5 TABLET, ORALLY DISINTEGRATING ORAL at 09:25

## 2022-08-17 RX ADMIN — ASPIRIN 81 MG CHEWABLE TABLET 81 MG: 81 TABLET CHEWABLE at 09:24

## 2022-08-17 RX ADMIN — METOPROLOL SUCCINATE 25 MG: 25 TABLET, EXTENDED RELEASE ORAL at 09:24

## 2022-08-17 ASSESSMENT — ACTIVITIES OF DAILY LIVING (ADL)
ADLS_ACUITY_SCORE: 52
ADLS_ACUITY_SCORE: 52
ADLS_ACUITY_SCORE: 55
ADLS_ACUITY_SCORE: 53
ADLS_ACUITY_SCORE: 52
ADLS_ACUITY_SCORE: 55
ADLS_ACUITY_SCORE: 52
ADLS_ACUITY_SCORE: 55
ADLS_ACUITY_SCORE: 53
ADLS_ACUITY_SCORE: 52
ADLS_ACUITY_SCORE: 55
ADLS_ACUITY_SCORE: 52

## 2022-08-17 NOTE — PROGRESS NOTES
MD Notification    Notified Person: MD    Notified Person Name: Dr. Noel     Notification Date/Time: 8/17 0502    Notification Interaction: AmCom    Purpose of Notification: Pt's Poll[place] isn't emptying properly. There was a kink but think there might be a clot, tried flushing and still no output. BS for 305 mL. Order for irrigation?    Orders Received:    Comments:

## 2022-08-17 NOTE — PROGRESS NOTES
[facility]  Hospitalist Progress Note  Jules Welch MD  08/17/2022    Assessment & Plan   Natahn Macias is a 89 year old male with a history of afib on Eliquis who was brought to the ED with dysarthria, aphasia and ataxia and noted to have an acute ischemic stroke of the RIGHT cerebellum. He is admitted for further evaluation and management.      Acute Ischemic stroke of RIGHT cerebellum  - TTE with normal EF, indeterminate diastolic function and neg bubble.  - Expressive aphasia (persistent), not a candidate for stroke intervention.  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 No evidence of malignancy on screen.   - Exam per neuro;  dysarthria, decreased JAGDISH in RUE and RLE, ataxia with finger to nose and heel to shin with RUE and RLE. normal on the left  - dysarthria and ataxia with very slow improvement, Strength intact. Taking po.   - sitter since fall on 8/6  - head ct post fall stable.   - Continue PTA statin. Cont PtA dose as LDL 40   - Cont PTA BP meds, eliquis  - Secondary stroke prevention with apixaban + ASA 81 mg daily x90 days, then just apixaban after that.   - neurochecks q4H   - PT/OT/SLP recommending TCU  - Long term goals < 130/80, LDL goal <70, A1c goal <7  - Follow up with general neurology in 6-8 weeks   - repeat CTH non contrast showed continued evolution of right cerebellar infarction       Unwitnessed fall 8/6  No neuro changes. No change in status prefall  Bed alarm not on or not working  Pt is ataxic from stroke  No injury. Hb stable  Bun.cr elevated, give fluid bolus and maint fluids  -Cr better with fluids  -stable neuro checks      HERNAN.   prerenal  Hb stable.   Held ace inh and hydrochlorothiazide  Given 500cc fluid bolus and IVF,now off IVF  Cr at baseline   Discontinue hydrochlorothiazide in the future     Atrial fibrillation  - continue BB and apixaban.     DMII. A1C is  7.1.   range today range.   - hold metformin while inpatient  - sliding scale insulin  - added prandial aspart --> increased to 1 unit per 10 gram carbs 8/6, adjust as needed.      Hypertension  Good control  - Resumed PTA meds.  - Prn hydralazine.  - discontinue hydrochlorothiazide and resume lisinopril given HERNAN after discharge  - consider dose reduction of lisinopril  - BP stable on metoprolol 25 mg daily.    Cough  Sore throat:  No infection seen on CT chest.Neg for strep AG.  Afebrile.   - Cepacol. Prn.  - Prn antitussives.  - IS.     Delirium  Probable underlyng cognitive issues.  Wife reports that the patient has been having issues with short-term memories for about a year (forgetting keys and groceries etc).  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 Needed sitter started.   -needed prn zyprex aon 8/2 and 8/3.   Impulsive. Pulled out iv, pulled at catheter.   8/4 No agitation, cooperative. Remains impulsive at times.   ucx ngtd  Cefuroxime for possible uti- stopped given ucx ngt   -add qpm meletonin  - Delirium bundle, decrease frequency of vitals and telemetry  - Feliz as below for retention.  - Formal cognitive testing as outpatient.  - continue zyprexa to 5mg q AM and bedtime to 20 mg and 5mg q 6 hrs prn for agitation    Prostatomegaly on CT (unsure if new but was on flomax PTA).  Urinary retention causing frequent cath.  Feliz traums 8/15  Hx of tx for bph and LUTS for 2 years with flomax  UA 8/3: 37 wbc, 176 rbc  - Indewelling feliz.  - Continue flomax.  - Bladder scan given confusion.  - Urology consult. Seen 8/3, rec feliz and consider voiding trial in several days. Cont flomax. CT scan reviewed and agree with radiology interpretation . No indication for urgent urologic intervention. Urology signed off  - follow up ucx shows no growth  - consider voiding trial in 2-3 days, if discharge with feliz outpatient urology follow up  [facility] urology or at [facility]rology  - pulled  "at catheter. Some blood in feliz. No clots. RN to flush, follow closely.   -8/5, still with bloody urine, ucx ngtd.    - 8/10 UA abnormal, UCx negative, discontinue empiric iv ceftriaxone, keep penile tip around feliz catheter clean.  - patient noted to have coke color urine, ua + hematuria  - hgb down 14.5 > 13.3    Pulmonary nodule: 3mm, accidental.  - Repeat CT in 12 months.  - pcp follow up      DVT Prophylaxis: apixaban.    Code Status: Full Code     Disposition:   -- plan for TCU when off restraints    Interval History   -- mitts removed, less agitated  -- discussed with RN  -- UA confirmed hematuria due to feliz trauma    -Data reviewed today: I reviewed all new labs and imaging over the last 24 hours. I personally reviewed no images or EKG's today.    Physical Exam    , Blood pressure 111/57, pulse 83, temperature 97.4  F (36.3  C), temperature source Oral, resp. rate 16, height 1.715 m (5' 7.5\"), weight 84 kg (185 lb 3 oz), SpO2 99 %.  Vitals:    08/01/22 0944   Weight: 84 kg (185 lb 3 oz)     Vital Signs with Ranges  Temp:  [96.4  F (35.8  C)-97.7  F (36.5  C)] 97.4  F (36.3  C)  Pulse:  [78-97] 83  Resp:  [16] 16  BP: (111-168)/(57-93) 111/57  SpO2:  [95 %-99 %] 99 %  I/O's Last 24 hours  I/O last 3 completed shifts:  In: 503 [P.O.:500; I.V.:3]  Out: 955 [Urine:955]    Constitutional:  dysarthric  Respiratory: Clear to auscultation bilaterally, no crackles or wheezing  Cardiovascular: Regular rate and rhythm, normal S1 and S2   GI: Normal bowel sounds, soft, non-distended, non-tender  Skin/Integumen: No rashes, no cyanosis, no edema  Other: Dysarthria, bilateral UE ataxia    Medications   All medications were reviewed.    - MEDICATION INSTRUCTIONS -       - MEDICATION INSTRUCTIONS -       - MEDICATION INSTRUCTIONS -         apixaban ANTICOAGULANT  5 mg Oral BID     aspirin  81 mg Oral Daily     atorvastatin  10 mg Oral or Feeding Tube QPM     [Held by provider] hydrochlorothiazide  25 mg Oral Daily     " insulin aspart   Subcutaneous TID w/meals     insulin aspart  1-3 Units Subcutaneous TID AC     insulin aspart  1-3 Units Subcutaneous At Bedtime     lisinopril  20 mg Oral Daily     metoprolol succinate ER  25 mg Oral Daily     OLANZapine zydis  20 mg Oral At Bedtime     OLANZapine zydis  5 mg Oral QAM     sodium chloride (PF)  3 mL Intracatheter Q8H     tamsulosin  0.4 mg Oral Daily        Data   Recent Labs   Lab 08/17/22  1212 08/17/22  0814 08/17/22  0755 08/16/22  1659 08/16/22  1305 08/14/22  0747 08/14/22  0606   WBC  --   --  8.6  --  6.2  --  7.8   HGB  --   --  13.3  --  14.5  --  14.3   MCV  --   --  94  --  94  --  92   PLT  --   --  295  --  233  --  147*   NA  --   --  138  --   --   --  137   POTASSIUM  --   --  4.7  --   --   --  4.8   CHLORIDE  --   --  108  --   --   --  106   CO2  --   --  27  --   --   --  25   BUN  --   --  50*  --   --   --  43*   CR  --   --  1.08  --   --   --  1.05   ANIONGAP  --   --  3  --   --   --  6   MAURICIO  --   --  8.9  --   --   --  8.8   * 207* 213*   < >  --    < > 171*    < > = values in this interval not displayed.       No results found for this or any previous visit (from the past 24 hour(s)).    Jules Welch MD  Text Page  (7am to 6pm)

## 2022-08-18 ENCOUNTER — APPOINTMENT (OUTPATIENT)
Dept: SPEECH THERAPY | Facility: CLINIC | Age: 87
DRG: 065 | End: 2022-08-18
Payer: COMMERCIAL

## 2022-08-18 ENCOUNTER — APPOINTMENT (OUTPATIENT)
Dept: PHYSICAL THERAPY | Facility: CLINIC | Age: 87
DRG: 065 | End: 2022-08-18
Payer: COMMERCIAL

## 2022-08-18 LAB
ANION GAP SERPL CALCULATED.3IONS-SCNC: 6 MMOL/L (ref 3–14)
BUN SERPL-MCNC: 66 MG/DL (ref 7–30)
CALCIUM SERPL-MCNC: 8.7 MG/DL (ref 8.5–10.1)
CHLORIDE BLD-SCNC: 109 MMOL/L (ref 94–109)
CO2 SERPL-SCNC: 25 MMOL/L (ref 20–32)
CREAT SERPL-MCNC: 1.42 MG/DL (ref 0.66–1.25)
ERYTHROCYTE [DISTWIDTH] IN BLOOD BY AUTOMATED COUNT: 13.3 % (ref 10–15)
GFR SERPL CREATININE-BSD FRML MDRD: 47 ML/MIN/1.73M2
GLUCOSE BLD-MCNC: 180 MG/DL (ref 70–99)
GLUCOSE BLDC GLUCOMTR-MCNC: 149 MG/DL (ref 70–99)
GLUCOSE BLDC GLUCOMTR-MCNC: 152 MG/DL (ref 70–99)
GLUCOSE BLDC GLUCOMTR-MCNC: 175 MG/DL (ref 70–99)
GLUCOSE BLDC GLUCOMTR-MCNC: 178 MG/DL (ref 70–99)
GLUCOSE BLDC GLUCOMTR-MCNC: 209 MG/DL (ref 70–99)
HCT VFR BLD AUTO: 40.1 % (ref 40–53)
HGB BLD-MCNC: 13.1 G/DL (ref 13.3–17.7)
MCH RBC QN AUTO: 30.8 PG (ref 26.5–33)
MCHC RBC AUTO-ENTMCNC: 32.7 G/DL (ref 31.5–36.5)
MCV RBC AUTO: 94 FL (ref 78–100)
PLATELET # BLD AUTO: 226 10E3/UL (ref 150–450)
POTASSIUM BLD-SCNC: 4.7 MMOL/L (ref 3.4–5.3)
RBC # BLD AUTO: 4.25 10E6/UL (ref 4.4–5.9)
SODIUM SERPL-SCNC: 140 MMOL/L (ref 133–144)
TROPONIN I SERPL HS-MCNC: 64 NG/L
WBC # BLD AUTO: 9 10E3/UL (ref 4–11)

## 2022-08-18 PROCEDURE — 120N000001 HC R&B MED SURG/OB

## 2022-08-18 PROCEDURE — 84132 ASSAY OF SERUM POTASSIUM: CPT

## 2022-08-18 PROCEDURE — 85027 COMPLETE CBC AUTOMATED: CPT

## 2022-08-18 PROCEDURE — 250N000013 HC RX MED GY IP 250 OP 250 PS 637: Performed by: HOSPITALIST

## 2022-08-18 PROCEDURE — 84484 ASSAY OF TROPONIN QUANT: CPT

## 2022-08-18 PROCEDURE — 92526 ORAL FUNCTION THERAPY: CPT | Mod: GN | Performed by: SPEECH-LANGUAGE PATHOLOGIST

## 2022-08-18 PROCEDURE — 250N000013 HC RX MED GY IP 250 OP 250 PS 637: Performed by: STUDENT IN AN ORGANIZED HEALTH CARE EDUCATION/TRAINING PROGRAM

## 2022-08-18 PROCEDURE — 250N000013 HC RX MED GY IP 250 OP 250 PS 637: Performed by: INTERNAL MEDICINE

## 2022-08-18 PROCEDURE — 258N000003 HC RX IP 258 OP 636: Performed by: INTERNAL MEDICINE

## 2022-08-18 PROCEDURE — 99232 SBSQ HOSP IP/OBS MODERATE 35: CPT | Performed by: INTERNAL MEDICINE

## 2022-08-18 PROCEDURE — 36415 COLL VENOUS BLD VENIPUNCTURE: CPT

## 2022-08-18 PROCEDURE — 97530 THERAPEUTIC ACTIVITIES: CPT | Mod: GP

## 2022-08-18 RX ORDER — QUETIAPINE FUMARATE 50 MG/1
50 TABLET, FILM COATED ORAL AT BEDTIME
Status: DISCONTINUED | OUTPATIENT
Start: 2022-08-18 | End: 2022-08-30 | Stop reason: HOSPADM

## 2022-08-18 RX ORDER — QUETIAPINE FUMARATE 25 MG/1
25 TABLET, FILM COATED ORAL EVERY 6 HOURS PRN
Status: DISCONTINUED | OUTPATIENT
Start: 2022-08-18 | End: 2022-08-18

## 2022-08-18 RX ORDER — QUETIAPINE FUMARATE 50 MG/1
100 TABLET, FILM COATED ORAL AT BEDTIME
Status: DISCONTINUED | OUTPATIENT
Start: 2022-08-18 | End: 2022-08-18

## 2022-08-18 RX ADMIN — ACETAMINOPHEN 1000 MG: 500 TABLET, FILM COATED ORAL at 12:59

## 2022-08-18 RX ADMIN — QUETIAPINE FUMARATE 50 MG: 50 TABLET ORAL at 21:15

## 2022-08-18 RX ADMIN — METOPROLOL SUCCINATE 25 MG: 25 TABLET, EXTENDED RELEASE ORAL at 08:30

## 2022-08-18 RX ADMIN — INSULIN ASPART 5 UNITS: 100 INJECTION, SOLUTION INTRAVENOUS; SUBCUTANEOUS at 13:00

## 2022-08-18 RX ADMIN — ATORVASTATIN CALCIUM 10 MG: 10 TABLET, FILM COATED ORAL at 21:15

## 2022-08-18 RX ADMIN — ASPIRIN 81 MG CHEWABLE TABLET 81 MG: 81 TABLET CHEWABLE at 08:31

## 2022-08-18 RX ADMIN — LISINOPRIL 20 MG: 20 TABLET ORAL at 08:31

## 2022-08-18 RX ADMIN — INSULIN ASPART 6 UNITS: 100 INJECTION, SOLUTION INTRAVENOUS; SUBCUTANEOUS at 08:42

## 2022-08-18 RX ADMIN — SODIUM CHLORIDE: 9 INJECTION, SOLUTION INTRAVENOUS at 01:18

## 2022-08-18 RX ADMIN — APIXABAN 5 MG: 5 TABLET, FILM COATED ORAL at 08:31

## 2022-08-18 RX ADMIN — OLANZAPINE 5 MG: 5 TABLET, ORALLY DISINTEGRATING ORAL at 08:31

## 2022-08-18 RX ADMIN — APIXABAN 5 MG: 5 TABLET, FILM COATED ORAL at 21:15

## 2022-08-18 RX ADMIN — TAMSULOSIN HYDROCHLORIDE 0.4 MG: 0.4 CAPSULE ORAL at 08:31

## 2022-08-18 ASSESSMENT — ACTIVITIES OF DAILY LIVING (ADL)
ADLS_ACUITY_SCORE: 55

## 2022-08-18 NOTE — SIGNIFICANT EVENT
Writer called into room by charge nurse @ 2330 as patient was found on floor next to bed. Vital and neuro assessment stable. House NP called to bedside. Patient assisted to cart with ceiling lift and transported for stat CT. Post fall huddle completed.

## 2022-08-18 NOTE — CODE/RAPID RESPONSE
[facility]    Fall Note  8/17/2022   Time Called: 2338    Code Status: Full Code  Called for fall assessment    Assessment & Plan     I was called to assess Nathan Macias following a fall. The patient reports prior to the fall he was attempting to exit his bed, he was unable to convey what his motivation was. Patient is very dysarthric and somewhat difficult to understand but this is baseline per RN. Patient had unwitnessed and unassisted fall in his room while attempting to exit his bed. Patient endorses striking his head. Patient has a skin tear to his right elbow but otherwise no obvious signs of injury. Patient was able to move all extremities without pain or tenderness. Patient was able to adduct/abduct all joints without pain. Patient reports pain of the right scalp. Patient's neurological exam notable for DYSARTHRIA, RUE and RLE ataxia. Strength 4/5 in all extremities. Joints intact, spine intact, no step-offs, pain or tenderness in any joint.    Injury noted to skin of right elbow. Patient assisted back to bed by staff and use of mechanical lift and transported to CT.    Mechanical fall  - Per patient he struck his head during the fall; patient is on DOAC    Diagnostics:  - Serial Troponin  - Telemetry for 24 hours  - Q4h neurological assessments overnight  - BMP, CBC  - STAT head CT w/o contrast    Patient remains on station 73.       Allergies   No Known Allergies    Physical Exam   Vital Signs with Ranges:  Temp:  [97.4  F (36.3  C)-97.7  F (36.5  C)] 97.4  F (36.3  C)  Pulse:  [83-97] 83  Resp:  [16] 16  BP: (111-168)/(57-93) 111/57  SpO2:  [95 %-99 %] 99 %  I/O last 3 completed shifts:  In: 603 [P.O.:600; I.V.:3]  Out: 900 [Urine:880; Other:20]      Data     ABG:  -No lab results found in last 7 days.    IMAGING: (X-ray/CT/MRI)   No results found for this or any previous visit (from the past 24 hour(s)).    CBC with Diff:  Recent Labs   Lab Test 08/17/22  6342  08/02/22  0747 08/01/22  0939   WBC 8.6   < > 5.6   HGB 13.3   < > 11.0*   MCV 94   < > 92      < > 147*   INR  --   --  1.37*    < > = values in this interval not displayed.        Lactic Acid:    No results found for: LACT        Comprehensive Metabolic Panel:  Recent Labs   Lab 08/17/22  2138 08/17/22  0814 08/17/22  0755   NA  --   --  138   POTASSIUM  --   --  4.7   CHLORIDE  --   --  108   CO2  --   --  27   ANIONGAP  --   --  3   *   < > 213*   BUN  --   --  50*   CR  --   --  1.08   GFRESTIMATED  --   --  66   MAURICIO  --   --  8.9    < > = values in this interval not displayed.       INR:    Recent Labs   Lab Test 08/01/22  0939   INR 1.37*         BNP:  No results found for: BNP    UA:  Recent Labs   Lab 08/16/22  1312   COLOR Dark Red*   APPEARANCE Slightly Cloudy*   URINEGLC Negative   URINEBILI Negative   URINEKETONE Trace*   SG 1.028   UBLD Large*   URINEPH 6.0   PROTEIN 100 *   NITRITE Negative   LEUKEST Trace*   RBCU >182*   WBCU 10*       Time Spent on this Encounter   I spent 25 minutes on the unit/floor managing the care of Nathan Macias. Over 50% of my time was spent counseling the patient and/or coordinating care regarding services listed in this note.    MARY Taylor CNP, APRN, CNP  Hospitalist - [facility]  Text me on the [email or web address] for a textback  Text page with web based paging for a callback

## 2022-08-18 NOTE — PROGRESS NOTES
MD Notification    Notified Person: MD    Notified Person Name: Dr. Noel    Notification Date/Time: 8/17/22 9055    Notification Interaction:Text page    Purpose of Notification: Low  ml dark red with sediment/ irrigated with 30 ml/ bladder scanned 9 ml    Orders Received:    Comments:

## 2022-08-18 NOTE — PROGRESS NOTES
[facility]  Hospitalist Progress Note  Jules Welch MD  08/18/2022    Assessment & Plan   Nathan Macias is a 89 year old male with a history of afib on Eliquis who was brought to the ED with dysarthria, aphasia and ataxia and noted to have an acute ischemic stroke of the RIGHT cerebellum. He is admitted for further evaluation and management.      Acute Ischemic stroke of RIGHT cerebellum  - TTE with normal EF, indeterminate diastolic function and neg bubble.  - Expressive aphasia (persistent), not a candidate for stroke intervention.  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 No evidence of malignancy on screen.   - Exam per neuro;  dysarthria, decreased JAGDISH in RUE and RLE, ataxia with finger to nose and heel to shin with RUE and RLE. normal on the left  - dysarthria and ataxia with very slow improvement, Strength intact. Taking po.   - sitter since fall on 8/6  - head ct post fall stable.   - Continue PTA statin. Cont PtA dose as LDL 40   - Cont PTA BP meds, eliquis  - Secondary stroke prevention with apixaban + ASA 81 mg daily x90 days, then just apixaban after that.   - neurochecks q4H   - PT/OT/SLP recommending TCU  - Long term goals < 130/80, LDL goal <70, A1c goal <7  - Follow up with general neurology in 6-8 weeks   - repeat CTH non contrast showed continued evolution of right cerebellar infarction       Delirium  Probable underlyng cognitive issues.  Wife reports that the patient has been having issues with short-term memories for about a year (forgetting keys and groceries etc).  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 Needed sitter started.   -needed prn zyprex aon 8/2 and 8/3.   Impulsive. Pulled out iv, pulled at catheter.   8/4 No agitation, cooperative. Remains impulsive at times.   ucx ngtd  Cefuroxime for possible uti- stopped given ucx ngt   -add qpm  meletonin  - Delirium bundle, decrease frequency of vitals and telemetry  - Feliz as below for retention.  - Formal cognitive testing as outpatient.  - had another fall last night, he appears to be heavily sedated  - will discontinue zyprexa and change to seroquel at bedtime and prn   - called and updated his wife.  - discontinue telemetry       Unwitnessed fall 8/6 and 8/17  No neuro changes. No change in status prefall  Bed alarm not on or not working  Pt is ataxic from stroke  No injury. Hb stable  Bun.cr elevated, given fluid bolus and maint fluids will SL  -Cr better with fluids  Had fall night of 8/17, seen by house [facility], labs stable, CTH showed nothing acute       HERNAN.   prerenal  Hb stable.   Held ace inh and hydrochlorothiazide  Given 500cc fluid bolus and IVF,now off IVF  Cr at baseline   Discontinue hydrochlorothiazide in the future     Atrial fibrillation  - continue BB and apixaban.     DMII. A1C is 7.1.   range today range.   - hold metformin while inpatient  - sliding scale insulin  - added prandial aspart --> increased to 1 unit per 10 gram carbs 8/6, adjust as needed.      Hypertension  Good control  - Resumed PTA meds.  - Prn hydralazine.  - discontinue hydrochlorothiazide and resume lisinopril given HERNAN after discharge  - consider dose reduction of lisinopril  - BP stable on metoprolol 25 mg daily.    Cough  Sore throat:  No infection seen on CT chest.Neg for strep AG.  Afebrile.   - Cepacol. Prn.  - Prn antitussives.  - IS.    Prostatomegaly on CT (unsure if new but was on flomax PTA).  Urinary retention causing frequent cath.  Feliz traums 8/15  Hx of tx for bph and LUTS for 2 years with flomax  UA 8/3: 37 wbc, 176 rbc  - Indewelling feliz.  - Continue flomax.  - Bladder scan given confusion.  - Urology consult. Seen 8/3, rec feliz and consider voiding trial in several days. Cont flomax. CT scan reviewed and agree with radiology interpretation . No indication for urgent urologic  "intervention. Urology signed off  - follow up ucx shows no growth  - consider voiding trial in 2-3 days, if discharge with feliz outpatient urology follow up  [facility]rology or at [facility] urology  - pulled at catheter. Some blood in feliz. No clots. RN to flush, follow closely.   -8/5, still with bloody urine, ucx ngtd.    - 8/10 UA abnormal, UCx negative, discontinue empiric iv ceftriaxone, keep penile tip around feliz catheter clean.  - patient noted to have coke color urine, ua + hematuria  - hgb down 14.5 > 13.3    Pulmonary nodule: 3mm, accidental.  - Repeat CT in 12 months.  - pcp follow up      DVT Prophylaxis: apixaban.    Code Status: Full Code     Disposition:   -- will likely need memory care vs TCU    Interval History   -- mitts removed, less agitated  -- discussed with RN  -- UA confirmed hematuria due to feliz trauma    -Data reviewed today: I reviewed all new labs and imaging over the last 24 hours. I personally reviewed no images or EKG's today.    Physical Exam    , Blood pressure 120/48, pulse 95, temperature 97.5  F (36.4  C), temperature source Oral, resp. rate 16, height 1.715 m (5' 7.5\"), weight 84 kg (185 lb 3 oz), SpO2 98 %.  Vitals:    08/01/22 0944   Weight: 84 kg (185 lb 3 oz)     Vital Signs with Ranges  Temp:  [97.1  F (36.2  C)-97.5  F (36.4  C)] 97.5  F (36.4  C)  Pulse:  [83-95] 95  Resp:  [16-20] 16  BP: (110-120)/(48-63) 120/48  SpO2:  [98 %-99 %] 98 %  I/O's Last 24 hours  I/O last 3 completed shifts:  In: 603 [P.O.:600; I.V.:3]  Out: 880 [Urine:740; Other:140]    Constitutional:  Sleeping, sedated  Respiratory: Clear to auscultation bilaterally, no crackles or wheezing  Cardiovascular: Regular rate and rhythm, normal S1 and S2   GI: Normal bowel sounds, soft, non-distended, non-tender  Skin/Integumen: No rashes, no cyanosis, no edema  Other:      Medications   All medications were reviewed.    - MEDICATION INSTRUCTIONS -       - MEDICATION INSTRUCTIONS -       - MEDICATION " INSTRUCTIONS -         apixaban ANTICOAGULANT  5 mg Oral BID     aspirin  81 mg Oral Daily     atorvastatin  10 mg Oral or Feeding Tube QPM     [Held by provider] hydrochlorothiazide  25 mg Oral Daily     insulin aspart   Subcutaneous TID w/meals     insulin aspart  1-3 Units Subcutaneous TID AC     insulin aspart  1-3 Units Subcutaneous At Bedtime     lisinopril  20 mg Oral Daily     metoprolol succinate ER  25 mg Oral Daily     OLANZapine zydis  20 mg Oral At Bedtime     OLANZapine zydis  5 mg Oral QAM     sodium chloride (PF)  3 mL Intracatheter Q8H     tamsulosin  0.4 mg Oral Daily        Data   Recent Labs   Lab 08/18/22  0841 08/18/22  0218 08/18/22  0148 08/17/22  0814 08/17/22  0755 08/16/22  1659 08/16/22  1305 08/14/22  0747 08/14/22  0606   WBC  --  9.0  --   --  8.6  --  6.2  --  7.8   HGB  --  13.1*  --   --  13.3  --  14.5  --  14.3   MCV  --  94  --   --  94  --  94  --  92   PLT  --  226  --   --  295  --  233  --  147*   NA  --  140  --   --  138  --   --   --  137   POTASSIUM  --  4.7  --   --  4.7  --   --   --  4.8   CHLORIDE  --  109  --   --  108  --   --   --  106   CO2  --  25  --   --  27  --   --   --  25   BUN  --  66*  --   --  50*  --   --   --  43*   CR  --  1.42*  --   --  1.08  --   --   --  1.05   ANIONGAP  --  6  --   --  3  --   --   --  6   MAURICIO  --  8.7  --   --  8.9  --   --   --  8.8   * 180* 178*   < > 213*   < >  --    < > 171*    < > = values in this interval not displayed.       Recent Results (from the past 24 hour(s))   CT Head w/o Contrast    Narrative    EXAM: CT HEAD W/O CONTRAST  LOCATION: [facility]  DATE/TIME: 8/18/2022 12:06 AM    INDICATION: Unwitnessed fall with head strike, patient is anticoagulated with DOAC.  COMPARISON: Head CT 8/13/2022 and brain MRA 8/1/2022  TECHNIQUE: Routine CT Head without IV contrast. Multiplanar reformats. Dose reduction techniques were used.    FINDINGS:  INTRACRANIAL CONTENTS: No intracranial  hemorrhage, extraaxial collection, or mass effect.  No CT evidence of acute infarct. Evolving subacute right cerebellar infarct again noted. Chronic left anterior-inferior frontal lobe infarct again noted. Mild to   moderate volume loss and mild burden presumed chronic small vessel ischemic changes are stable.    VISUALIZED ORBITS/SINUSES/MASTOIDS: No intraorbital abnormality. No paranasal sinus mucosal disease. No middle ear or mastoid effusion.    BONES/SOFT TISSUES: No acute abnormality.      Impression    IMPRESSION:  1.  No acute hemorrhage or mass effect.    2.  Evolving subacute right cerebellar infarct.    3.  Age-related and chronic ischemic changes are otherwise stable.       Jules Welch MD  Text Page  (7am to 6pm)

## 2022-08-19 ENCOUNTER — APPOINTMENT (OUTPATIENT)
Dept: PHYSICAL THERAPY | Facility: CLINIC | Age: 87
DRG: 065 | End: 2022-08-19
Payer: COMMERCIAL

## 2022-08-19 ENCOUNTER — APPOINTMENT (OUTPATIENT)
Dept: SPEECH THERAPY | Facility: CLINIC | Age: 87
DRG: 065 | End: 2022-08-19
Payer: COMMERCIAL

## 2022-08-19 ENCOUNTER — APPOINTMENT (OUTPATIENT)
Dept: OCCUPATIONAL THERAPY | Facility: CLINIC | Age: 87
DRG: 065 | End: 2022-08-19
Payer: COMMERCIAL

## 2022-08-19 LAB
ALBUMIN UR-MCNC: 50 MG/DL
APPEARANCE UR: ABNORMAL
BILIRUB UR QL STRIP: NEGATIVE
COLOR UR AUTO: ABNORMAL
GLUCOSE BLDC GLUCOMTR-MCNC: 133 MG/DL (ref 70–99)
GLUCOSE BLDC GLUCOMTR-MCNC: 156 MG/DL (ref 70–99)
GLUCOSE BLDC GLUCOMTR-MCNC: 175 MG/DL (ref 70–99)
GLUCOSE BLDC GLUCOMTR-MCNC: 201 MG/DL (ref 70–99)
GLUCOSE BLDC GLUCOMTR-MCNC: 234 MG/DL (ref 70–99)
GLUCOSE UR STRIP-MCNC: NEGATIVE MG/DL
HGB UR QL STRIP: ABNORMAL
KETONES UR STRIP-MCNC: NEGATIVE MG/DL
LEUKOCYTE ESTERASE UR QL STRIP: ABNORMAL
NITRATE UR QL: NEGATIVE
PH UR STRIP: 5.5 [PH] (ref 5–7)
RBC URINE: >182 /HPF
SP GR UR STRIP: 1.02 (ref 1–1.03)
UROBILINOGEN UR STRIP-MCNC: NORMAL MG/DL
WBC CLUMPS #/AREA URNS HPF: PRESENT /HPF
WBC URINE: >182 /HPF
YEAST #/AREA URNS HPF: ABNORMAL /HPF

## 2022-08-19 PROCEDURE — 97530 THERAPEUTIC ACTIVITIES: CPT | Mod: GO | Performed by: OCCUPATIONAL THERAPIST

## 2022-08-19 PROCEDURE — 250N000012 HC RX MED GY IP 250 OP 636 PS 637: Performed by: INTERNAL MEDICINE

## 2022-08-19 PROCEDURE — 97110 THERAPEUTIC EXERCISES: CPT | Mod: GO | Performed by: OCCUPATIONAL THERAPIST

## 2022-08-19 PROCEDURE — 92526 ORAL FUNCTION THERAPY: CPT | Mod: GN | Performed by: SPEECH-LANGUAGE PATHOLOGIST

## 2022-08-19 PROCEDURE — 92507 TX SP LANG VOICE COMM INDIV: CPT | Mod: GN | Performed by: SPEECH-LANGUAGE PATHOLOGIST

## 2022-08-19 PROCEDURE — 250N000013 HC RX MED GY IP 250 OP 250 PS 637: Performed by: STUDENT IN AN ORGANIZED HEALTH CARE EDUCATION/TRAINING PROGRAM

## 2022-08-19 PROCEDURE — 250N000013 HC RX MED GY IP 250 OP 250 PS 637: Performed by: HOSPITALIST

## 2022-08-19 PROCEDURE — 97530 THERAPEUTIC ACTIVITIES: CPT | Mod: GP

## 2022-08-19 PROCEDURE — 87086 URINE CULTURE/COLONY COUNT: CPT | Performed by: PHYSICIAN ASSISTANT

## 2022-08-19 PROCEDURE — 250N000011 HC RX IP 250 OP 636: Performed by: PHYSICIAN ASSISTANT

## 2022-08-19 PROCEDURE — 81001 URINALYSIS AUTO W/SCOPE: CPT | Performed by: PHYSICIAN ASSISTANT

## 2022-08-19 PROCEDURE — 250N000013 HC RX MED GY IP 250 OP 250 PS 637: Performed by: INTERNAL MEDICINE

## 2022-08-19 PROCEDURE — 120N000001 HC R&B MED SURG/OB

## 2022-08-19 PROCEDURE — 99232 SBSQ HOSP IP/OBS MODERATE 35: CPT | Performed by: INTERNAL MEDICINE

## 2022-08-19 RX ORDER — CEFTRIAXONE 1 G/1
1 INJECTION, POWDER, FOR SOLUTION INTRAMUSCULAR; INTRAVENOUS EVERY 24 HOURS
Status: DISCONTINUED | OUTPATIENT
Start: 2022-08-19 | End: 2022-08-25

## 2022-08-19 RX ADMIN — ATORVASTATIN CALCIUM 10 MG: 10 TABLET, FILM COATED ORAL at 20:17

## 2022-08-19 RX ADMIN — INSULIN ASPART 2 UNITS: 100 INJECTION, SOLUTION INTRAVENOUS; SUBCUTANEOUS at 08:08

## 2022-08-19 RX ADMIN — APIXABAN 5 MG: 5 TABLET, FILM COATED ORAL at 08:11

## 2022-08-19 RX ADMIN — INSULIN ASPART 1 UNITS: 100 INJECTION, SOLUTION INTRAVENOUS; SUBCUTANEOUS at 12:27

## 2022-08-19 RX ADMIN — CEFTRIAXONE SODIUM 1 G: 1 INJECTION, POWDER, FOR SOLUTION INTRAMUSCULAR; INTRAVENOUS at 22:12

## 2022-08-19 RX ADMIN — QUETIAPINE FUMARATE 50 MG: 50 TABLET ORAL at 22:11

## 2022-08-19 RX ADMIN — APIXABAN 5 MG: 5 TABLET, FILM COATED ORAL at 20:17

## 2022-08-19 RX ADMIN — INSULIN ASPART 4 UNITS: 100 INJECTION, SOLUTION INTRAVENOUS; SUBCUTANEOUS at 18:38

## 2022-08-19 RX ADMIN — LISINOPRIL 20 MG: 20 TABLET ORAL at 08:11

## 2022-08-19 RX ADMIN — ASPIRIN 81 MG CHEWABLE TABLET 81 MG: 81 TABLET CHEWABLE at 08:11

## 2022-08-19 RX ADMIN — METOPROLOL SUCCINATE 25 MG: 25 TABLET, EXTENDED RELEASE ORAL at 08:11

## 2022-08-19 RX ADMIN — TAMSULOSIN HYDROCHLORIDE 0.4 MG: 0.4 CAPSULE ORAL at 08:11

## 2022-08-19 ASSESSMENT — ACTIVITIES OF DAILY LIVING (ADL)
ADLS_ACUITY_SCORE: 55
ADLS_ACUITY_SCORE: 51
ADLS_ACUITY_SCORE: 51
ADLS_ACUITY_SCORE: 55

## 2022-08-19 NOTE — PROGRESS NOTES
No acute changes during shift. Wakeful during meals and with therapies, somewhat drowsy between cares.

## 2022-08-19 NOTE — PROGRESS NOTES
Notified provider about indwelling feliz catheter discussed removal or continued need.    Did provider choose to remove indwelling feliz catheter? No    Provider's feliz indication for keeping indwelling feliz catheter: Retention.    Is there an order for indwelling feliz catheter? Yes    *If there is a plan to keep feliz catheter in place at discharge daily notification with provider is not necessary.

## 2022-08-20 LAB
GLUCOSE BLDC GLUCOMTR-MCNC: 107 MG/DL (ref 70–99)
GLUCOSE BLDC GLUCOMTR-MCNC: 150 MG/DL (ref 70–99)
GLUCOSE BLDC GLUCOMTR-MCNC: 155 MG/DL (ref 70–99)
GLUCOSE BLDC GLUCOMTR-MCNC: 177 MG/DL (ref 70–99)
GLUCOSE BLDC GLUCOMTR-MCNC: 271 MG/DL (ref 70–99)

## 2022-08-20 PROCEDURE — 250N000013 HC RX MED GY IP 250 OP 250 PS 637: Performed by: STUDENT IN AN ORGANIZED HEALTH CARE EDUCATION/TRAINING PROGRAM

## 2022-08-20 PROCEDURE — 250N000011 HC RX IP 250 OP 636: Performed by: PHYSICIAN ASSISTANT

## 2022-08-20 PROCEDURE — 250N000013 HC RX MED GY IP 250 OP 250 PS 637: Performed by: HOSPITALIST

## 2022-08-20 PROCEDURE — 250N000013 HC RX MED GY IP 250 OP 250 PS 637: Performed by: INTERNAL MEDICINE

## 2022-08-20 PROCEDURE — 120N000001 HC R&B MED SURG/OB

## 2022-08-20 PROCEDURE — 99232 SBSQ HOSP IP/OBS MODERATE 35: CPT | Performed by: INTERNAL MEDICINE

## 2022-08-20 RX ADMIN — INSULIN ASPART 3 UNITS: 100 INJECTION, SOLUTION INTRAVENOUS; SUBCUTANEOUS at 18:26

## 2022-08-20 RX ADMIN — QUETIAPINE FUMARATE 50 MG: 50 TABLET ORAL at 22:48

## 2022-08-20 RX ADMIN — CEFTRIAXONE SODIUM 1 G: 1 INJECTION, POWDER, FOR SOLUTION INTRAMUSCULAR; INTRAVENOUS at 22:47

## 2022-08-20 RX ADMIN — APIXABAN 5 MG: 5 TABLET, FILM COATED ORAL at 09:43

## 2022-08-20 RX ADMIN — APIXABAN 5 MG: 5 TABLET, FILM COATED ORAL at 20:08

## 2022-08-20 RX ADMIN — ATORVASTATIN CALCIUM 10 MG: 10 TABLET, FILM COATED ORAL at 20:09

## 2022-08-20 RX ADMIN — TAMSULOSIN HYDROCHLORIDE 0.4 MG: 0.4 CAPSULE ORAL at 09:43

## 2022-08-20 RX ADMIN — INSULIN ASPART 5 UNITS: 100 INJECTION, SOLUTION INTRAVENOUS; SUBCUTANEOUS at 09:43

## 2022-08-20 RX ADMIN — METOPROLOL SUCCINATE 25 MG: 25 TABLET, EXTENDED RELEASE ORAL at 09:42

## 2022-08-20 RX ADMIN — INSULIN ASPART 5 UNITS: 100 INJECTION, SOLUTION INTRAVENOUS; SUBCUTANEOUS at 13:31

## 2022-08-20 RX ADMIN — ASPIRIN 81 MG CHEWABLE TABLET 81 MG: 81 TABLET CHEWABLE at 09:42

## 2022-08-20 RX ADMIN — LISINOPRIL 20 MG: 20 TABLET ORAL at 09:42

## 2022-08-20 ASSESSMENT — ACTIVITIES OF DAILY LIVING (ADL)
ADLS_ACUITY_SCORE: 55
ADLS_ACUITY_SCORE: 53
ADLS_ACUITY_SCORE: 51
ADLS_ACUITY_SCORE: 51
ADLS_ACUITY_SCORE: 55
ADLS_ACUITY_SCORE: 51
ADLS_ACUITY_SCORE: 55
ADLS_ACUITY_SCORE: 55
ADLS_ACUITY_SCORE: 51
ADLS_ACUITY_SCORE: 55
ADLS_ACUITY_SCORE: 51
ADLS_ACUITY_SCORE: 55

## 2022-08-20 NOTE — PROVIDER NOTIFICATION
MD Notification    Notified Person: MD    Notified Person Name: Maria Elena ParnellRothburyCAMILLE harman    Notification Date/Time: 8/19/22 2137    Notification Interaction: text page    Purpose of Notification: FYI- urine sample resulted.     Orders Received:    Comments:

## 2022-08-20 NOTE — PROGRESS NOTES
Cross Cover note    Paged re: Pt pulled out feliz yesterday & was replaced. Upon assessment, there is pus draining out of urethra around the feliz tube. Would you like a UA?       Will check UA.    ADDENDUM: Will start Rocephin. Await UC.    Maria Elena Tyler PA-C

## 2022-08-20 NOTE — PROGRESS NOTES
[facility]  Hospitalist Progress Note  Jules Welch MD  08/20/2022    Assessment & Plan   Nathan Macias is a 89 year old male with a history of afib on Eliquis who was brought to the ED with dysarthria, aphasia and ataxia and noted to have an acute ischemic stroke of the RIGHT cerebellum. He is admitted for further evaluation and management.      Acute Ischemic stroke of RIGHT cerebellum  - TTE with normal EF, indeterminate diastolic function and neg bubble.  - Expressive aphasia (persistent), not a candidate for stroke intervention.  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 No evidence of malignancy on screen.   - Exam per neuro;  dysarthria, decreased JAGDISH in RUE and RLE, ataxia with finger to nose and heel to shin with RUE and RLE. normal on the left  - dysarthria and ataxia with very slow improvement, Strength intact. Taking po.   - sitter since fall on 8/6  - head ct post fall stable.   - Continue PTA statin. Cont PtA dose as LDL 40   - Cont PTA BP meds, eliquis  - Secondary stroke prevention with apixaban + ASA 81 mg daily x90 days, then just apixaban after that.   - neurochecks q4H   - PT/OT/SLP recommending TCU  - Long term goals < 130/80, LDL goal <70, A1c goal <7  - Follow up with general neurology in 6-8 weeks   - repeat CTH non contrast showed continued evolution of right cerebellar infarction    Delirium  Probable underlyng cognitive issues.  Wife reports that the patient has been having issues with short-term memories for about a year (forgetting keys and groceries etc).  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 Needed sitter started.   -needed prn zyprex aon 8/2 and 8/3.   Impulsive. Pulled out iv, pulled at catheter.   8/4 No agitation, cooperative. Remains impulsive at times.   ucx ngtd  Cefuroxime for possible uti- stopped given ucx ngt   -add qpm  meletonin  - Delirium bundle, decrease frequency of vitals and telemetry  - Feliz as below for retention.  - Formal cognitive testing as outpatient.  - had another fall night on 8/17   - zyprexa discontinued and started on seroquel at bedtime and prn.      Unwitnessed fall 8/6 and 8/17  No neuro changes. No change in status prefall  Bed alarm not on or not working  Pt is ataxic from stroke  No injury. Hb stable  Bun.cr elevated, given fluid bolus and maint fluids will SL  -Cr better with fluids  Had fall night of 8/17, seen by house ashleigh, labs stable, CTH showed nothing acute       HERNAN.   prerenal  Hb stable.   Held ace inh and hydrochlorothiazide  Given 500cc fluid bolus and IVF,now off IVF  Cr at baseline   Discontinue hydrochlorothiazide in the future     Atrial fibrillation  - continue BB and apixaban.     DMII. A1C is 7.1.   range today range.   - hold metformin while inpatient  - sliding scale insulin  - added prandial aspart --> increased to 1 unit per 10 gram carbs 8/6, adjust as needed.      Hypertension  Good control  - Resumed PTA meds.  - Prn hydralazine.  - discontinue hydrochlorothiazide   - continue lisinopril 20 mg daily  - BP stable on metoprolol 25 mg daily.    Cough  Sore throat:  No infection seen on CT chest.Neg for strep AG.  Afebrile.   - Cepacol. Prn.  - Prn antitussives.  - IS.    Prostatomegaly on CT (unsure if new but was on flomax PTA).  Urinary retention causing frequent cath.  Feliz traums 8/15  Hx of tx for bph and LUTS for 2 years with flomax  UA 8/3: 37 wbc, 176 rbc  - Indewelling feliz.  - Continue flomax.  - Bladder scan given confusion.  - Urology consult. Seen 8/3, rec feliz and consider voiding trial in several days. Cont flomax. CT scan reviewed and agree with radiology interpretation . No indication for urgent urologic intervention. Urology signed off  - follow up ucx shows no growth  - consider voiding trial in 2-3 days, if discharge with feliz outpatient urology follow  "[facility] urology or at [facility] urology  - pulled at catheter. Some blood in feliz. No clots. RN to flush, follow closely.   -8/5, still with bloody urine, ucx ngtd.    - 8/10 UA abnormal, UCx negative, discontinue empiric iv ceftriaxone, keep penile tip around feliz catheter clean.  - patient noted to have coke color urine, ua + hematuria, now clearing  - penile discharge again noted, UA abnormao 8/19 and started on ceftriaxone pending urine cultures  - will discontinue feliz, use depends and SIC    Pulmonary nodule: 3mm, accidental.  - Repeat CT in 12 months.  - pcp follow up      DVT Prophylaxis: apixaban.    Code Status: Full Code     Disposition:   -- will likely need memory care vs TCU    Interval History   -- off mitts  -- discussed with RN  -- some penile discharge around feliz    -Data reviewed today: I reviewed all new labs and imaging over the last 24 hours. I personally reviewed no images or EKG's today.    Physical Exam    , Blood pressure 108/60, pulse 86, temperature 97.2  F (36.2  C), temperature source Axillary, resp. rate 14, height 1.715 m (5' 7.5\"), weight 84 kg (185 lb 3 oz), SpO2 96 %.  Vitals:    08/01/22 0944   Weight: 84 kg (185 lb 3 oz)     Vital Signs with Ranges  Temp:  [97.2  F (36.2  C)-98.2  F (36.8  C)] 97.2  F (36.2  C)  Pulse:  [66-88] 86  Resp:  [14-20] 14  BP: ()/(40-68) 108/60  SpO2:  [95 %-98 %] 96 %  I/O's Last 24 hours  I/O last 3 completed shifts:  In: 310 [P.O.:310]  Out: 1140 [Urine:1140]    Constitutional:  More alert, less dysarthric, follows commands  Respiratory: Clear to auscultation bilaterally, no crackles or wheezing  Cardiovascular: Regular rate and rhythm, normal S1 and S2   GI: Normal bowel sounds, soft, non-distended, non-tender  Skin/Integumen: No rashes, no cyanosis, no edema  Other: Bilateral UE ataxia, dysarthria     Medications   All medications were reviewed.    - MEDICATION INSTRUCTIONS -       - MEDICATION INSTRUCTIONS -       - MEDICATION " INSTRUCTIONS -         apixaban ANTICOAGULANT  5 mg Oral BID     aspirin  81 mg Oral Daily     atorvastatin  10 mg Oral or Feeding Tube QPM     cefTRIAXone  1 g Intravenous Q24H     [Held by provider] hydrochlorothiazide  25 mg Oral Daily     insulin aspart   Subcutaneous TID w/meals     insulin aspart  1-3 Units Subcutaneous TID AC     insulin aspart  1-3 Units Subcutaneous At Bedtime     lisinopril  20 mg Oral Daily     metoprolol succinate ER  25 mg Oral Daily     QUEtiapine  50 mg Oral At Bedtime     sodium chloride (PF)  3 mL Intracatheter Q8H     tamsulosin  0.4 mg Oral Daily        Data   Recent Labs   Lab 08/20/22  1121 08/20/22  0840 08/20/22  0201 08/18/22  0841 08/18/22  0218 08/17/22  0814 08/17/22  0755 08/16/22  1659 08/16/22  1305 08/14/22  0747 08/14/22  0606   WBC  --   --   --   --  9.0  --  8.6  --  6.2  --  7.8   HGB  --   --   --   --  13.1*  --  13.3  --  14.5  --  14.3   MCV  --   --   --   --  94  --  94  --  94  --  92   PLT  --   --   --   --  226  --  295  --  233  --  147*   NA  --   --   --   --  140  --  138  --   --   --  137   POTASSIUM  --   --   --   --  4.7  --  4.7  --   --   --  4.8   CHLORIDE  --   --   --   --  109  --  108  --   --   --  106   CO2  --   --   --   --  25  --  27  --   --   --  25   BUN  --   --   --   --  66*  --  50*  --   --   --  43*   CR  --   --   --   --  1.42*  --  1.08  --   --   --  1.05   ANIONGAP  --   --   --   --  6  --  3  --   --   --  6   MAURICIO  --   --   --   --  8.7  --  8.9  --   --   --  8.8   * 150* 155*   < > 180*   < > 213*   < >  --    < > 171*    < > = values in this interval not displayed.       No results found for this or any previous visit (from the past 24 hour(s)).    Jules Welch MD  Text Page  (7am to 6pm)

## 2022-08-20 NOTE — PROVIDER NOTIFICATION
MD Notification    Notified Person: MD    Notified Person Name: Maria Elena CAMILLE Waggoner    Notification Date/Time: 8/19/22 2008    Notification Interaction: text page    Purpose of Notification: Pt pulled out feliz yesterday & was replaced. Upon assessment, there is pus draining out of urethra around the feliz tube. Would you like a UA?    Orders Received:    Comments:

## 2022-08-21 LAB
ANION GAP SERPL CALCULATED.3IONS-SCNC: 5 MMOL/L (ref 3–14)
BACTERIA UR CULT: NO GROWTH
BUN SERPL-MCNC: 67 MG/DL (ref 7–30)
CALCIUM SERPL-MCNC: 8.4 MG/DL (ref 8.5–10.1)
CHLORIDE BLD-SCNC: 110 MMOL/L (ref 94–109)
CO2 SERPL-SCNC: 26 MMOL/L (ref 20–32)
CREAT SERPL-MCNC: 1.11 MG/DL (ref 0.66–1.25)
ERYTHROCYTE [DISTWIDTH] IN BLOOD BY AUTOMATED COUNT: 13.4 % (ref 10–15)
GFR SERPL CREATININE-BSD FRML MDRD: 63 ML/MIN/1.73M2
GLUCOSE BLD-MCNC: 171 MG/DL (ref 70–99)
GLUCOSE BLDC GLUCOMTR-MCNC: 163 MG/DL (ref 70–99)
GLUCOSE BLDC GLUCOMTR-MCNC: 168 MG/DL (ref 70–99)
GLUCOSE BLDC GLUCOMTR-MCNC: 178 MG/DL (ref 70–99)
GLUCOSE BLDC GLUCOMTR-MCNC: 192 MG/DL (ref 70–99)
GLUCOSE BLDC GLUCOMTR-MCNC: 200 MG/DL (ref 70–99)
HCT VFR BLD AUTO: 38.9 % (ref 40–53)
HGB BLD-MCNC: 12.8 G/DL (ref 13.3–17.7)
MCH RBC QN AUTO: 30.8 PG (ref 26.5–33)
MCHC RBC AUTO-ENTMCNC: 32.9 G/DL (ref 31.5–36.5)
MCV RBC AUTO: 94 FL (ref 78–100)
PLATELET # BLD AUTO: 232 10E3/UL (ref 150–450)
POTASSIUM BLD-SCNC: 4.7 MMOL/L (ref 3.4–5.3)
RBC # BLD AUTO: 4.16 10E6/UL (ref 4.4–5.9)
SODIUM SERPL-SCNC: 141 MMOL/L (ref 133–144)
WBC # BLD AUTO: 7 10E3/UL (ref 4–11)

## 2022-08-21 PROCEDURE — 250N000013 HC RX MED GY IP 250 OP 250 PS 637: Performed by: HOSPITALIST

## 2022-08-21 PROCEDURE — 80048 BASIC METABOLIC PNL TOTAL CA: CPT | Performed by: INTERNAL MEDICINE

## 2022-08-21 PROCEDURE — 250N000013 HC RX MED GY IP 250 OP 250 PS 637: Performed by: INTERNAL MEDICINE

## 2022-08-21 PROCEDURE — 120N000001 HC R&B MED SURG/OB

## 2022-08-21 PROCEDURE — 250N000011 HC RX IP 250 OP 636: Performed by: PHYSICIAN ASSISTANT

## 2022-08-21 PROCEDURE — 250N000009 HC RX 250: Performed by: INTERNAL MEDICINE

## 2022-08-21 PROCEDURE — 250N000013 HC RX MED GY IP 250 OP 250 PS 637: Performed by: STUDENT IN AN ORGANIZED HEALTH CARE EDUCATION/TRAINING PROGRAM

## 2022-08-21 PROCEDURE — 85049 AUTOMATED PLATELET COUNT: CPT | Performed by: INTERNAL MEDICINE

## 2022-08-21 PROCEDURE — 99232 SBSQ HOSP IP/OBS MODERATE 35: CPT | Performed by: INTERNAL MEDICINE

## 2022-08-21 PROCEDURE — 36415 COLL VENOUS BLD VENIPUNCTURE: CPT | Performed by: INTERNAL MEDICINE

## 2022-08-21 RX ORDER — LIDOCAINE HYDROCHLORIDE 20 MG/ML
JELLY TOPICAL EVERY 4 HOURS PRN
Status: DISCONTINUED | OUTPATIENT
Start: 2022-08-21 | End: 2022-08-30 | Stop reason: HOSPADM

## 2022-08-21 RX ORDER — AMOXICILLIN 250 MG
1 CAPSULE ORAL 2 TIMES DAILY PRN
Status: DISCONTINUED | OUTPATIENT
Start: 2022-08-21 | End: 2022-08-30 | Stop reason: HOSPADM

## 2022-08-21 RX ORDER — BISACODYL 10 MG
10 SUPPOSITORY, RECTAL RECTAL DAILY PRN
Status: DISCONTINUED | OUTPATIENT
Start: 2022-08-21 | End: 2022-08-30 | Stop reason: HOSPADM

## 2022-08-21 RX ADMIN — ATORVASTATIN CALCIUM 10 MG: 10 TABLET, FILM COATED ORAL at 21:02

## 2022-08-21 RX ADMIN — INSULIN ASPART 2 UNITS: 100 INJECTION, SOLUTION INTRAVENOUS; SUBCUTANEOUS at 13:27

## 2022-08-21 RX ADMIN — CEFTRIAXONE SODIUM 1 G: 1 INJECTION, POWDER, FOR SOLUTION INTRAMUSCULAR; INTRAVENOUS at 21:02

## 2022-08-21 RX ADMIN — SENNOSIDES AND DOCUSATE SODIUM 1 TABLET: 50; 8.6 TABLET ORAL at 08:23

## 2022-08-21 RX ADMIN — APIXABAN 5 MG: 5 TABLET, FILM COATED ORAL at 08:23

## 2022-08-21 RX ADMIN — METOPROLOL SUCCINATE 25 MG: 25 TABLET, EXTENDED RELEASE ORAL at 08:23

## 2022-08-21 RX ADMIN — QUETIAPINE FUMARATE 50 MG: 50 TABLET ORAL at 21:02

## 2022-08-21 RX ADMIN — ACETAMINOPHEN 1000 MG: 500 TABLET, FILM COATED ORAL at 08:22

## 2022-08-21 RX ADMIN — APIXABAN 5 MG: 5 TABLET, FILM COATED ORAL at 21:02

## 2022-08-21 RX ADMIN — TAMSULOSIN HYDROCHLORIDE 0.4 MG: 0.4 CAPSULE ORAL at 08:23

## 2022-08-21 RX ADMIN — INSULIN ASPART 6 UNITS: 100 INJECTION, SOLUTION INTRAVENOUS; SUBCUTANEOUS at 17:41

## 2022-08-21 RX ADMIN — LIDOCAINE HYDROCHLORIDE: 20 JELLY TOPICAL at 14:12

## 2022-08-21 RX ADMIN — INSULIN ASPART 2 UNITS: 100 INJECTION, SOLUTION INTRAVENOUS; SUBCUTANEOUS at 09:19

## 2022-08-21 RX ADMIN — ASPIRIN 81 MG CHEWABLE TABLET 81 MG: 81 TABLET CHEWABLE at 08:23

## 2022-08-21 RX ADMIN — LISINOPRIL 20 MG: 20 TABLET ORAL at 08:23

## 2022-08-21 ASSESSMENT — ACTIVITIES OF DAILY LIVING (ADL)
ADLS_ACUITY_SCORE: 55

## 2022-08-21 NOTE — PROGRESS NOTES
[facility]  Hospitalist Progress Note  Jules Welch MD  08/21/2022    Assessment & Plan   Nathan Macias is a 89 year old male with a history of afib on Eliquis who was brought to the ED with dysarthria, aphasia and ataxia and noted to have an acute ischemic stroke of the RIGHT cerebellum. He is admitted for further evaluation and management.      Acute Ischemic stroke of RIGHT cerebellum  - TTE with normal EF, indeterminate diastolic function and neg bubble.  - Expressive aphasia (persistent), not a candidate for stroke intervention.  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 No evidence of malignancy on screen.   - Exam per neuro;  dysarthria, decreased JAGDISH in RUE and RLE, ataxia with finger to nose and heel to shin with RUE and RLE. normal on the left  - dysarthria and ataxia with very slow improvement, Strength intact. Taking po.   - sitter since fall on 8/6  - head ct post fall stable.   - Continue PTA statin. Cont PtA dose as LDL 40   - Cont PTA BP meds, eliquis  - Secondary stroke prevention with apixaban + ASA 81 mg daily x90 days, then just apixaban after that.   - neurochecks q4H   - PT/OT/SLP recommending TCU  - Long term goals < 130/80, LDL goal <70, A1c goal <7  - Follow up with general neurology in 6-8 weeks   - repeat CTH non contrast showed continued evolution of right cerebellar infarction    Delirium  Probable underlyng cognitive issues.  Wife reports that the patient has been having issues with short-term memories for about a year (forgetting keys and groceries etc).  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 Needed sitter started.   -needed prn zyprex aon 8/2 and 8/3.   Impulsive. Pulled out iv, pulled at catheter.   8/4 No agitation, cooperative. Remains impulsive at times.   ucx ngtd  Cefuroxime for possible uti- stopped given ucx ngt   -add qpm  meletonin  - Delirium bundle, decrease frequency of vitals and telemetry  - Feliz as below for retention.  - Formal cognitive testing as outpatient.  - had another fall night on 8/17   - zyprexa discontinued and started on seroquel at bedtime and prn.      Unwitnessed fall 8/6 and 8/17  No neuro changes. No change in status prefall  Bed alarm not on or not working  Pt is ataxic from stroke  No injury. Hb stable  Bun.cr elevated, given fluid bolus and maint fluids will SL  -Cr better with fluids  Had fall night of 8/17, seen by house ashleigh, labs stable, CTH showed nothing acute       HERNAN.   prerenal  Hb stable.   Held ace inh and hydrochlorothiazide  Given 500cc fluid bolus and IVF,now off IVF  Cr at baseline   Discontinue hydrochlorothiazide in the future     Atrial fibrillation  - continue BB and apixaban.     DMII. A1C is 7.1.   range today range.   - hold metformin while inpatient  - sliding scale insulin  - added prandial aspart --> increased to 1 unit per 10 gram carbs 8/6, adjust as needed.      Hypertension  Good control  - Resumed PTA meds.  - Prn hydralazine.  - discontinue hydrochlorothiazide   - continue lisinopril 20 mg daily  - BP stable on metoprolol 25 mg daily.    Cough  Sore throat:  No infection seen on CT chest.Neg for strep AG.  Afebrile.   - Cepacol. Prn.  - Prn antitussives.  - IS.    Prostatomegaly on CT (unsure if new but was on flomax PTA).  Urinary retention causing frequent cath.  Feliz traums 8/15  Hx of tx for bph and LUTS for 2 years with flomax  UA 8/3: 37 wbc, 176 rbc  - Indewelling feliz.  - Continue flomax.  - Bladder scan given confusion.  - Urology consult. Seen 8/3, rec feliz and consider voiding trial in several days. Cont flomax. CT scan reviewed and agree with radiology interpretation . No indication for urgent urologic intervention. Urology signed off  - follow up ucx shows no growth  - consider voiding trial in 2-3 days, if discharge with feliz outpatient urology follow  "[facility] urology or at [facility] urology  - pulled at catheter. Some blood in feliz. No clots. RN to flush, follow closely.   -8/5, still with bloody urine, ucx ngtd.    - 8/10 UA abnormal, UCx negative, discontinue empiric iv ceftriaxone, keep penile tip around feliz catheter clean.  - patient noted to have coke color urine, ua + hematuria, now clearing  - penile discharge again noted, UA abnormal 8/19 and started on ceftriaxone pending urine cultures NGTD  - discontinued feliz, use depends and SIC with uroget    Pulmonary nodule: 3mm, accidental.  - Repeat CT in 12 months.  - pcp follow up      DVT Prophylaxis: apixaban.    Code Status: Full Code     Disposition:   -- TCU on 8/22    Interval History   -- stable and cooperative  -- discussed with RN  -- straight intermittent cath for urinary retention    -Data reviewed today: I reviewed all new labs and imaging over the last 24 hours. I personally reviewed no images or EKG's today.    Physical Exam    , Blood pressure 123/75, pulse 84, temperature (!) 96.5  F (35.8  C), temperature source Axillary, resp. rate 16, height 1.715 m (5' 7.5\"), weight 84 kg (185 lb 3 oz), SpO2 95 %.  Vitals:    08/01/22 0944   Weight: 84 kg (185 lb 3 oz)     Vital Signs with Ranges  Temp:  [96.5  F (35.8  C)-98  F (36.7  C)] 96.5  F (35.8  C)  Pulse:  [69-85] 84  Resp:  [16] 16  BP: ()/(52-75) 123/75  SpO2:  [95 %-96 %] 95 %  I/O's Last 24 hours  I/O last 3 completed shifts:  In: 683 [P.O.:680; I.V.:3]  Out: 900 [Urine:900]    Constitutional:  More alert, less dysarthric, follows commands  Respiratory: Clear to auscultation bilaterally, no crackles or wheezing  Cardiovascular: Regular rate and rhythm, normal S1 and S2   GI: Normal bowel sounds, soft, non-distended, non-tender  Skin/Integumen: No rashes, no cyanosis, no edema  Other: Bilateral UE ataxia, dysarthria     Medications   All medications were reviewed.    - MEDICATION INSTRUCTIONS -       - MEDICATION INSTRUCTIONS -       " - MEDICATION INSTRUCTIONS -         apixaban ANTICOAGULANT  5 mg Oral BID     aspirin  81 mg Oral Daily     atorvastatin  10 mg Oral or Feeding Tube QPM     cefTRIAXone  1 g Intravenous Q24H     [Held by provider] hydrochlorothiazide  25 mg Oral Daily     insulin aspart   Subcutaneous TID w/meals     insulin aspart  1-3 Units Subcutaneous TID AC     insulin aspart  1-3 Units Subcutaneous At Bedtime     lisinopril  20 mg Oral Daily     metoprolol succinate ER  25 mg Oral Daily     QUEtiapine  50 mg Oral At Bedtime     sodium chloride (PF)  3 mL Intracatheter Q8H     tamsulosin  0.4 mg Oral Daily        Data   Recent Labs   Lab 08/21/22  1227 08/21/22  0902 08/21/22  0717 08/18/22  0841 08/18/22  0218 08/17/22  0814 08/17/22  0755   WBC  --  7.0  --   --  9.0  --  8.6   HGB  --  12.8*  --   --  13.1*  --  13.3   MCV  --  94  --   --  94  --  94   PLT  --  232  --   --  226  --  295   NA  --  141  --   --  140  --  138   POTASSIUM  --  4.7  --   --  4.7  --  4.7   CHLORIDE  --  110*  --   --  109  --  108   CO2  --  26  --   --  25  --  27   BUN  --  67*  --   --  66*  --  50*   CR  --  1.11  --   --  1.42*  --  1.08   ANIONGAP  --  5  --   --  6  --  3   MAURICIO  --  8.4*  --   --  8.7  --  8.9   * 171* 163*   < > 180*   < > 213*    < > = values in this interval not displayed.       No results found for this or any previous visit (from the past 24 hour(s)).    Jules Welch MD  Text Page  (7am to 6pm)

## 2022-08-21 NOTE — PROGRESS NOTES
Care Management Follow Up    Length of Stay (days): 20    Expected Discharge Date: 08/22/2022     Concerns to be Addressed:       Patient plan of care discussed at interdisciplinary rounds: Yes    Anticipated Discharge Disposition: Acute Rehab     Anticipated Discharge Services: Transportation Services  Anticipated Discharge DME:      Patient/family educated on Medicare website which has current facility and service quality ratings:    Education Provided on the Discharge Plan:    Patient/Family in Agreement with the Plan: yes    Referrals Placed by CM/SW:    Private pay costs discussed: Not applicable    Additional Information:  Writer left voicemail for [facility] TCU who was assessing patient and had potential availability 8/15. Writer updated admissions at [facility] that he is medically ready for discharge.     Writer also resent referral to [facility] as they previously declined d/t behaviors - patient has no sitter/behaviors. Writer resent referral to [facility] to also reflect new notes of no behavioral concerns.     Patient has [employer] insurance and will need authorization before TCU admission.     Will continue to follow.    LEANDRA Jarrett, LGSW    [facility]

## 2022-08-22 ENCOUNTER — APPOINTMENT (OUTPATIENT)
Dept: OCCUPATIONAL THERAPY | Facility: CLINIC | Age: 87
DRG: 065 | End: 2022-08-22
Payer: COMMERCIAL

## 2022-08-22 LAB
GLUCOSE BLDC GLUCOMTR-MCNC: 125 MG/DL (ref 70–99)
GLUCOSE BLDC GLUCOMTR-MCNC: 138 MG/DL (ref 70–99)
GLUCOSE BLDC GLUCOMTR-MCNC: 148 MG/DL (ref 70–99)
GLUCOSE BLDC GLUCOMTR-MCNC: 161 MG/DL (ref 70–99)
GLUCOSE BLDC GLUCOMTR-MCNC: 167 MG/DL (ref 70–99)
GLUCOSE BLDC GLUCOMTR-MCNC: 197 MG/DL (ref 70–99)

## 2022-08-22 PROCEDURE — 250N000013 HC RX MED GY IP 250 OP 250 PS 637: Performed by: STUDENT IN AN ORGANIZED HEALTH CARE EDUCATION/TRAINING PROGRAM

## 2022-08-22 PROCEDURE — 250N000011 HC RX IP 250 OP 636: Performed by: PHYSICIAN ASSISTANT

## 2022-08-22 PROCEDURE — 250N000013 HC RX MED GY IP 250 OP 250 PS 637: Performed by: INTERNAL MEDICINE

## 2022-08-22 PROCEDURE — 250N000013 HC RX MED GY IP 250 OP 250 PS 637: Performed by: HOSPITALIST

## 2022-08-22 PROCEDURE — 99232 SBSQ HOSP IP/OBS MODERATE 35: CPT | Performed by: INTERNAL MEDICINE

## 2022-08-22 PROCEDURE — 120N000001 HC R&B MED SURG/OB

## 2022-08-22 PROCEDURE — 97530 THERAPEUTIC ACTIVITIES: CPT | Mod: GO

## 2022-08-22 PROCEDURE — 97535 SELF CARE MNGMENT TRAINING: CPT | Mod: GO

## 2022-08-22 RX ADMIN — QUETIAPINE FUMARATE 50 MG: 50 TABLET ORAL at 21:09

## 2022-08-22 RX ADMIN — TAMSULOSIN HYDROCHLORIDE 0.4 MG: 0.4 CAPSULE ORAL at 08:41

## 2022-08-22 RX ADMIN — INSULIN ASPART 5 UNITS: 100 INJECTION, SOLUTION INTRAVENOUS; SUBCUTANEOUS at 08:44

## 2022-08-22 RX ADMIN — SENNOSIDES AND DOCUSATE SODIUM 1 TABLET: 50; 8.6 TABLET ORAL at 13:39

## 2022-08-22 RX ADMIN — CEFTRIAXONE SODIUM 1 G: 1 INJECTION, POWDER, FOR SOLUTION INTRAMUSCULAR; INTRAVENOUS at 21:13

## 2022-08-22 RX ADMIN — INSULIN ASPART 5 UNITS: 100 INJECTION, SOLUTION INTRAVENOUS; SUBCUTANEOUS at 12:47

## 2022-08-22 RX ADMIN — ATORVASTATIN CALCIUM 10 MG: 10 TABLET, FILM COATED ORAL at 20:10

## 2022-08-22 RX ADMIN — LISINOPRIL 20 MG: 20 TABLET ORAL at 08:41

## 2022-08-22 RX ADMIN — METOPROLOL SUCCINATE 25 MG: 25 TABLET, EXTENDED RELEASE ORAL at 08:41

## 2022-08-22 RX ADMIN — ASPIRIN 81 MG CHEWABLE TABLET 81 MG: 81 TABLET CHEWABLE at 08:41

## 2022-08-22 RX ADMIN — INSULIN ASPART 8 UNITS: 100 INJECTION, SOLUTION INTRAVENOUS; SUBCUTANEOUS at 17:33

## 2022-08-22 RX ADMIN — APIXABAN 5 MG: 5 TABLET, FILM COATED ORAL at 08:41

## 2022-08-22 RX ADMIN — APIXABAN 5 MG: 5 TABLET, FILM COATED ORAL at 20:10

## 2022-08-22 ASSESSMENT — ACTIVITIES OF DAILY LIVING (ADL)
ADLS_ACUITY_SCORE: 55

## 2022-08-22 NOTE — PROGRESS NOTES
Care Management Follow Up    Length of Stay (days): 21    Expected Discharge Date: 08/23/2022     Concerns to be Addressed:       Patient plan of care discussed at interdisciplinary rounds: Yes    Anticipated Discharge Disposition:TCU   Anticipated Discharge Services: Transportation Services  Anticipated Discharge DME:      Patient/family educated on Medicare website which has current facility and service quality ratings:    Education Provided on the Discharge Plan: Yes   Patient/Family in Agreement with the Plan: yes    Referrals Placed by CM/SW:  Referral sent to [facility] per request. ( wife does NOT want [facility])   Private pay costs discussed:     Additional Information:  SW received message from pt's wife requesting return call. KIMBERLY updated [place] regarding need to secure bed for pt prior to discharge.  KIMBERLY called and left message with Elysia at [facility] requesting return call regarding any bed availability.  Pt has been sitter free for 2 days.    KIMBERLY received return call from Elysia with [facility] regarding referral and they decline pt stating that they do not have an appropriate bed for pt.    KIMBERLY called [facility] and left message regarding referral. KIMBERLY also faxed updated therapy notes and progress notes for assessment. Return call requested.      HELEN Hall  [facility]  Care Transitions  [phone]

## 2022-08-22 NOTE — PROVIDER NOTIFICATION
MD Notification    Notified Person: MD    Notified Person Name: Rebekah    Notification Date/Time: 08/22/22 3871    Notification Interaction: [place]    Purpose of Notification: Hello! Pt feliz was removed 8/20 due to pulling at tube. Since then he has had to be straight cathed multiple times. He is not voiding on his own. Some blood was noted coming from penis. Should we continue straight cath? Or insert feliz again? Thank you!    Orders Received: Straight cath as needed

## 2022-08-22 NOTE — PROGRESS NOTES
CLINICAL NUTRITION SERVICES - REASSESSMENT NOTE      Recommendations Ordered by Registered Dietitian (RD):     Modify supplement order ---> will have pt try the Magic Cup supplement with dinner     Malnutrition:   % Weight Loss:  None noted  % Intake:  <75% for > 7 days (moderate malnutrition)  Subcutaneous Fat Loss:  None observed per RD 8/8  Muscle Loss:  None observed per RD 8/8, suspect some component of age related sarcopenia though this does not meet malnutrition criteria   Fluid Retention:  None noted    Malnutrition Diagnosis: Patient does not meet two of the above criteria necessary for diagnosing malnutrition       EVALUATION OF PROGRESS TOWARD GOALS   Diet:    Moderate CHO, Minced/Moist (L5), Moderately Thick liquids (L3)  Room Service with Assist  Supplement ---> GelateinPLUS with breakfast    Intake/Tolerance:    Chart reviewed  Pt needs assist with feeding  He is being seen daily for meal ordering  Per flowsheets, meal intake has been ~50-75%    Pt currently working with Therapy      NEW FINDINGS:     No new weight since admit - 84 kg (8/1). Had been stable over the past 6 months prior to admission    Plan TCU pending placement      Previous Goals (8/15):   Patient will consistently consume >/= 50% meals + supplements TID  Evaluation: partially met - pt only receiving supplement once daily    Previous Nutrition Diagnosis (8/15):   Inadequate oral intake related to confusion, restricted diet as evidenced by consuming </= 25% at most meals per chart review   Evaluation: Improving      MALNUTRITION  % Weight Loss:  None noted  % Intake:  <75% for > 7 days (moderate malnutrition)  Subcutaneous Fat Loss:  None observed per RD 8/8  Muscle Loss:  None observed per RD 8/8, suspect some component of age related sarcopenia though this does not meet malnutrition criteria   Fluid Retention:  None noted    Malnutrition Diagnosis: Patient does not meet two of the above criteria necessary for diagnosing  malnutrition      CURRENT NUTRITION DIAGNOSIS  No nutrition diagnosis identified at this time     INTERVENTIONS  Recommendations / Nutrition Prescription  Moderate CHO, Minced/Moist (L5), Moderately Thick liquids (L3)  Room Service with Assist    Modify supplement order ---> will have pt try the Magic Cup supplement with dinner      Goals  Pt to consume 75% meals and 100% of the supplement      MONITORING AND EVALUATION:  Progress towards goals will be monitored and evaluated per protocol and Practice Guidelines

## 2022-08-22 NOTE — PROGRESS NOTES
[facility]  Hospitalist Progress Note  Jules Welch MD  08/22/2022    Assessment & Plan   Nathan Macias is a 89 year old male with a history of afib on Eliquis who was brought to the ED with dysarthria, aphasia and ataxia and noted to have an acute ischemic stroke of the RIGHT cerebellum. He is admitted for further evaluation and management.      Acute Ischemic stroke of RIGHT cerebellum  - TTE with normal EF, indeterminate diastolic function and neg bubble.  - Expressive aphasia (persistent), not a candidate for stroke intervention.  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 No evidence of malignancy on screen.   - Exam per neuro;  dysarthria, decreased JAGDISH in RUE and RLE, ataxia with finger to nose and heel to shin with RUE and RLE. normal on the left  - dysarthria and ataxia with very slow improvement, Strength intact. Taking po.   - sitter since fall on 8/6 but has been without sitter for a number of days, no restraints  - head ct post fall stable.   - Continue PTA statin. Cont PtA dose as LDL 40   - Cont PTA BP meds, eliquis  - Secondary stroke prevention with apixaban + ASA 81 mg daily x90 days, then just apixaban after that.   - PT/OT/SLP recommending TCU  - Long term goals < 130/80, LDL goal <70, A1c goal <7  - Follow up with general neurology in 6-8 weeks   - repeat CTH non contrast showed continued evolution of right cerebellar infarction    Delirium  Probable underlyng cognitive issues.  Wife reports that the patient has been having issues with short-term memories for about a year (forgetting keys and groceries etc).  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 Needed sitter started.   -needed prn zyprex aon 8/2 and 8/3.   Impulsive. Pulled out iv, pulled at catheter.   8/4 No agitation, cooperative. Remains impulsive at times.   ucx ngtd  Cefuroxime for possible  uti- stopped given ucx ngt   -add qpm meletonin  - Delirium bundle, decrease frequency of vitals and telemetry  - Feliz as below for retention.  - Formal cognitive testing as outpatient.  - had fall night on 8/17   - zyprexa discontinued and continued on seroquel at bedtime and prn.      Unwitnessed fall 8/6 and 8/17  No neuro changes. No change in status prefall  Bed alarm not on or not working  Pt is ataxic from stroke  No injury. Hb stable  Bun.cr elevated, given fluid bolus and maint fluids will SL  -Cr better with fluids  Had fall night of 8/17, seen by house ashleigh, labs stable, CTH showed nothing acute       HERNAN.   prerenal  Hb stable.   Held ace inh and hydrochlorothiazide  Cr at baseline   Discontinue hydrochlorothiazide in the future     Atrial fibrillation  - continue BB and apixaban.     DMII. A1C is 7.1.   range today range.   - hold metformin while inpatient  - sliding scale insulin  - added prandial aspart --> increased to 1 unit per 10 gram carbs 8/6, adjust as needed.      Hypertension  Good control  - discontinue hydrochlorothiazide   - continue lisinopril 20 mg daily  - BP stable on metoprolol 25 mg daily.    Cough  Sore throat:  No infection seen on CT chest.Neg for strep AG.  Afebrile.   - Cepacol. Prn.  - Prn antitussives.  - IS.    Prostatomegaly on CT (unsure if new but was on flomax PTA).  Urinary retention causing frequent cath.  Feliz traums 8/15  Hx of tx for bph and LUTS for 2 years with flomax  UA 8/3: 37 wbc, 176 rbc  - Indewelling feliz.  - Continue flomax.  - Bladder scan given confusion.  - Urology consult. Seen 8/3, rec feliz and consider voiding trial in several days. Cont flomax. CT scan reviewed and agree with radiology interpretation . No indication for urgent urologic intervention. Urology signed off  - follow up ucx shows no growth  - consider voiding trial in 2-3 days, if discharge with feliz outpatient urology follow up  [facility]rology or at [facility]  - pulled at  "catheter. Some blood in feliz. No clots. RN to flush, follow closely.   -8/5, still with bloody urine, ucx ngtd.    - 8/10 UA abnormal, UCx negative, discontinue empiric iv ceftriaxone, keep penile tip around feliz catheter clean.  - patient noted to have coke color urine, ua + hematuria, now clearing  - penile discharge again noted, UA abnormal 8/19 and started on ceftriaxone pending urine cultures NGTD  - discontinued feliz   - now having large spontaneous voiding    Pulmonary nodule: 3mm, accidental.  - Repeat CT in 12 months.  - pcp follow up      DVT Prophylaxis: apixaban.    Code Status: Full Code     Disposition:   -- TCU on 8/22    Interval History   -- stable and cooperative  -- discussed with RN  -- straight intermittent cath for urinary retention    -Data reviewed today: I reviewed all new labs and imaging over the last 24 hours. I personally reviewed no images or EKG's today.    Physical Exam    , Blood pressure (!) 143/70, pulse 81, temperature 98  F (36.7  C), temperature source Oral, resp. rate 16, height 1.715 m (5' 7.5\"), weight 84 kg (185 lb 3 oz), SpO2 94 %.  Vitals:    08/01/22 0944   Weight: 84 kg (185 lb 3 oz)     Vital Signs with Ranges  Temp:  [97.8  F (36.6  C)-98  F (36.7  C)] 98  F (36.7  C)  Pulse:  [81-95] 81  Resp:  [15-16] 16  BP: (108-143)/(51-75) 143/70  SpO2:  [94 %-97 %] 94 %  I/O's Last 24 hours  I/O last 3 completed shifts:  In: -   Out: 275 [Urine:275]    Constitutional:  More alert, less dysarthric, follows commands  Respiratory: Clear to auscultation bilaterally, no crackles or wheezing  Cardiovascular: Regular rate and rhythm, normal S1 and S2   GI: Normal bowel sounds, soft, non-distended, non-tender  Skin/Integumen: No rashes, no cyanosis, no edema  Other: Bilateral UE ataxia, dysarthria     Medications   All medications were reviewed.    - MEDICATION INSTRUCTIONS -       - MEDICATION INSTRUCTIONS -       - MEDICATION INSTRUCTIONS -         apixaban ANTICOAGULANT  5 mg Oral " BID     aspirin  81 mg Oral Daily     atorvastatin  10 mg Oral or Feeding Tube QPM     cefTRIAXone  1 g Intravenous Q24H     [Held by provider] hydrochlorothiazide  25 mg Oral Daily     insulin aspart   Subcutaneous TID w/meals     insulin aspart  1-3 Units Subcutaneous TID AC     insulin aspart  1-3 Units Subcutaneous At Bedtime     lisinopril  20 mg Oral Daily     metoprolol succinate ER  25 mg Oral Daily     QUEtiapine  50 mg Oral At Bedtime     sodium chloride (PF)  3 mL Intracatheter Q8H     tamsulosin  0.4 mg Oral Daily        Data   Recent Labs   Lab 08/22/22  0728 08/22/22  0200 08/21/22  2133 08/21/22  1227 08/21/22  0902 08/18/22  0841 08/18/22  0218 08/17/22  0814 08/17/22  0756   WBC  --   --   --   --  7.0  --  9.0  --  8.6   HGB  --   --   --   --  12.8*  --  13.1*  --  13.3   MCV  --   --   --   --  94  --  94  --  94   PLT  --   --   --   --  232  --  226  --  295   NA  --   --   --   --  141  --  140  --  138   POTASSIUM  --   --   --   --  4.7  --  4.7  --  4.7   CHLORIDE  --   --   --   --  110*  --  109  --  108   CO2  --   --   --   --  26  --  25  --  27   BUN  --   --   --   --  67*  --  66*  --  50*   CR  --   --   --   --  1.11  --  1.42*  --  1.08   ANIONGAP  --   --   --   --  5  --  6  --  3   MAURICIO  --   --   --   --  8.4*  --  8.7  --  8.9   * 161* 200*   < > 171*   < > 180*   < > 213*    < > = values in this interval not displayed.       No results found for this or any previous visit (from the past 24 hour(s)).    Jules Welch MD  Text Page  (7am to 6pm)

## 2022-08-23 ENCOUNTER — APPOINTMENT (OUTPATIENT)
Dept: PHYSICAL THERAPY | Facility: CLINIC | Age: 87
DRG: 065 | End: 2022-08-23
Payer: COMMERCIAL

## 2022-08-23 ENCOUNTER — APPOINTMENT (OUTPATIENT)
Dept: SPEECH THERAPY | Facility: CLINIC | Age: 87
DRG: 065 | End: 2022-08-23
Payer: COMMERCIAL

## 2022-08-23 LAB
GLUCOSE BLDC GLUCOMTR-MCNC: 128 MG/DL (ref 70–99)
GLUCOSE BLDC GLUCOMTR-MCNC: 155 MG/DL (ref 70–99)
GLUCOSE BLDC GLUCOMTR-MCNC: 192 MG/DL (ref 70–99)
GLUCOSE BLDC GLUCOMTR-MCNC: 240 MG/DL (ref 70–99)
GLUCOSE BLDC GLUCOMTR-MCNC: 269 MG/DL (ref 70–99)

## 2022-08-23 PROCEDURE — 97530 THERAPEUTIC ACTIVITIES: CPT | Mod: GP

## 2022-08-23 PROCEDURE — 92507 TX SP LANG VOICE COMM INDIV: CPT | Mod: GN | Performed by: SPEECH-LANGUAGE PATHOLOGIST

## 2022-08-23 PROCEDURE — 99232 SBSQ HOSP IP/OBS MODERATE 35: CPT | Performed by: INTERNAL MEDICINE

## 2022-08-23 PROCEDURE — 250N000013 HC RX MED GY IP 250 OP 250 PS 637: Performed by: HOSPITALIST

## 2022-08-23 PROCEDURE — 250N000013 HC RX MED GY IP 250 OP 250 PS 637: Performed by: INTERNAL MEDICINE

## 2022-08-23 PROCEDURE — 250N000011 HC RX IP 250 OP 636: Performed by: PHYSICIAN ASSISTANT

## 2022-08-23 PROCEDURE — 97116 GAIT TRAINING THERAPY: CPT | Mod: GP

## 2022-08-23 PROCEDURE — 92526 ORAL FUNCTION THERAPY: CPT | Mod: GN | Performed by: SPEECH-LANGUAGE PATHOLOGIST

## 2022-08-23 PROCEDURE — 120N000001 HC R&B MED SURG/OB

## 2022-08-23 PROCEDURE — 250N000013 HC RX MED GY IP 250 OP 250 PS 637: Performed by: STUDENT IN AN ORGANIZED HEALTH CARE EDUCATION/TRAINING PROGRAM

## 2022-08-23 RX ADMIN — METOPROLOL SUCCINATE 25 MG: 25 TABLET, EXTENDED RELEASE ORAL at 09:33

## 2022-08-23 RX ADMIN — INSULIN ASPART 5 UNITS: 100 INJECTION, SOLUTION INTRAVENOUS; SUBCUTANEOUS at 12:47

## 2022-08-23 RX ADMIN — ATORVASTATIN CALCIUM 10 MG: 10 TABLET, FILM COATED ORAL at 21:09

## 2022-08-23 RX ADMIN — ASPIRIN 81 MG CHEWABLE TABLET 81 MG: 81 TABLET CHEWABLE at 09:33

## 2022-08-23 RX ADMIN — INSULIN ASPART 3 UNITS: 100 INJECTION, SOLUTION INTRAVENOUS; SUBCUTANEOUS at 09:51

## 2022-08-23 RX ADMIN — TAMSULOSIN HYDROCHLORIDE 0.4 MG: 0.4 CAPSULE ORAL at 09:33

## 2022-08-23 RX ADMIN — QUETIAPINE FUMARATE 50 MG: 50 TABLET ORAL at 21:09

## 2022-08-23 RX ADMIN — APIXABAN 5 MG: 5 TABLET, FILM COATED ORAL at 21:09

## 2022-08-23 RX ADMIN — CEFTRIAXONE SODIUM 1 G: 1 INJECTION, POWDER, FOR SOLUTION INTRAMUSCULAR; INTRAVENOUS at 21:54

## 2022-08-23 RX ADMIN — LISINOPRIL 20 MG: 20 TABLET ORAL at 09:33

## 2022-08-23 RX ADMIN — APIXABAN 5 MG: 5 TABLET, FILM COATED ORAL at 09:33

## 2022-08-23 ASSESSMENT — ACTIVITIES OF DAILY LIVING (ADL)
ADLS_ACUITY_SCORE: 48
ADLS_ACUITY_SCORE: 48
ADLS_ACUITY_SCORE: 49
ADLS_ACUITY_SCORE: 48
ADLS_ACUITY_SCORE: 49
ADLS_ACUITY_SCORE: 48
ADLS_ACUITY_SCORE: 49
ADLS_ACUITY_SCORE: 48

## 2022-08-23 NOTE — PROGRESS NOTES
[facility]  Hospitalist Progress Note  Jules Welch MD  08/23/2022    Assessment & Plan   Nathan Macias is a 89 year old male with a history of afib on Eliquis who was brought to the ED with dysarthria, aphasia and ataxia and noted to have an acute ischemic stroke of the RIGHT cerebellum. He is admitted for further evaluation and management.      Acute Ischemic stroke of RIGHT cerebellum  - TTE with normal EF, indeterminate diastolic function and neg bubble.  - Expressive aphasia (persistent), not a candidate for stroke intervention.  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 No evidence of malignancy on screen.   - Exam per neuro;  dysarthria, decreased JAGDISH in RUE and RLE, ataxia with finger to nose and heel to shin with RUE and RLE. normal on the left  - dysarthria and ataxia with very slow improvement, Strength intact. Taking po.   - sitter since fall on 8/6 but has been without sitter for a number of days, no restraints  - head ct post fall stable.   - Continue PTA statin. Cont PtA dose as LDL 40   - Cont PTA BP meds, eliquis  - Secondary stroke prevention with apixaban + ASA 81 mg daily x90 days, then just apixaban after that.   - PT/OT/SLP recommending TCU  - Long term goals < 130/80, LDL goal <70, A1c goal <7  - Follow up with general neurology in 6-8 weeks   - repeat CTH non contrast showed continued evolution of right cerebellar infarction  - making clinical improvement with improved aphasia and less delirium and agitation.    Delirium  Probable underlyng cognitive issues.  Wife reports that the patient has been having issues with short-term memories for about a year (forgetting keys and groceries etc).  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 Needed sitter started.   -needed prn zyprex aon 8/2 and 8/3.   Impulsive. Pulled out iv, pulled at catheter.   8/4 No  agitation, cooperative. Remains impulsive at times.   ucx ngtd  Cefuroxime for possible uti- stopped given ucx ngt   -add qpm meletonin  - Delirium bundle, decrease frequency of vitals and telemetry  - Feliz as below for retention.  - Formal cognitive testing as outpatient.  - had fall night on 8/17   - zyprexa discontinued and continued on seroquel at bedtime and prn.      Unwitnessed fall 8/6 and 8/17  No neuro changes. No change in status prefall  Bed alarm not on or not working  Pt is ataxic from stroke  No injury. Hb stable  Bun.cr elevated, given fluid bolus and maint fluids will SL  -Cr better with fluids  Had fall night of 8/17, seen by house ashleigh, labs stable, CTH showed nothing acute    HERNAN.   prerenal  Hb stable.   Held ace inh and hydrochlorothiazide  Cr at baseline   Discontinue hydrochlorothiazide in the future     Atrial fibrillation  - continue BB and apixaban.     DMII. A1C is 7.1.   range today range.   - hold metformin while inpatient  - sliding scale insulin  - added prandial aspart --> increased to 1 unit per 10 gram carbs 8/6, adjust as needed.      Hypertension  Good control  - discontinued hydrochlorothiazide   - continue lisinopril 20 mg daily  - BP stable on metoprolol 25 mg daily.    Cough  Sore throat:  No infection seen on CT chest.Neg for strep AG.  Afebrile.   - Cepacol. Prn.  - Prn antitussives.  - IS.    Prostatomegaly on CT (unsure if new but was on flomax PTA).  Urinary retention causing frequent cath.  Feliz traums 8/15  Hx of tx for bph and LUTS for 2 years with flomax  UA 8/3: 37 wbc, 176 rbc  - Indewelling feliz.  - Continue flomax.  - Bladder scan given confusion.  - Urology consult. Seen 8/3, rec feliz and consider voiding trial in several days. Cont flomax. CT scan reviewed and agree with radiology interpretation . No indication for urgent urologic intervention. Urology signed off  - follow up ucx shows no growth  - consider voiding trial in 2-3 days, if discharge with  "feliz outpatient urology follow up  [facility] urology or at [facility] urology  - pulled at catheter. Some blood in feliz. No clots. RN to flush, follow closely.   -8/5, still with bloody urine, ucx ngtd.    - 8/10 UA abnormal, UCx negative, discontinue empiric iv ceftriaxone, keep penile tip around feliz catheter clean.  - patient noted to have coke color urine, ua + hematuria, now clearing  - penile discharge again noted, UA abnormal 8/19 and started on ceftriaxone pending urine cultures NGTD  - discontinued feliz   - needing intermittent straight cath    Pulmonary nodule: 3mm, accidental.  - Repeat CT in 12 months.  - pcp follow up      DVT Prophylaxis: apixaban.    Code Status: Full Code     Disposition:   -- TCU on 8/24    Interval History   -- stable and cooperative  -- discussed with RN  -- straight intermittent cath for urinary retention    -Data reviewed today: I reviewed all new labs and imaging over the last 24 hours. I personally reviewed no images or EKG's today.    Physical Exam    , Blood pressure 113/72, pulse 87, temperature (!) 95.8  F (35.4  C), temperature source Axillary, resp. rate 16, height 1.715 m (5' 7.5\"), weight 84 kg (185 lb 3 oz), SpO2 97 %.  Vitals:    08/01/22 0944   Weight: 84 kg (185 lb 3 oz)     Vital Signs with Ranges  Temp:  [95.8  F (35.4  C)-98  F (36.7  C)] 95.8  F (35.4  C)  Pulse:  [59-99] 87  Resp:  [16] 16  BP: ()/(50-72) 113/72  SpO2:  [95 %-97 %] 97 %  I/O's Last 24 hours  I/O last 3 completed shifts:  In: 540 [P.O.:540]  Out: 900 [Urine:900]    Constitutional:  More alert, less dysarthric, follows commands  Respiratory: Clear to auscultation bilaterally, no crackles or wheezing  Cardiovascular: Regular rate and rhythm, normal S1 and S2   GI: Normal bowel sounds, soft, non-distended, non-tender  Skin/Integumen: No rashes, no cyanosis, no edema  Other: Bilateral UE ataxia, dysarthria     Medications   All medications were reviewed.    - MEDICATION INSTRUCTIONS -       - " MEDICATION INSTRUCTIONS -       - MEDICATION INSTRUCTIONS -         apixaban ANTICOAGULANT  5 mg Oral BID     aspirin  81 mg Oral Daily     atorvastatin  10 mg Oral or Feeding Tube QPM     cefTRIAXone  1 g Intravenous Q24H     [Held by provider] hydrochlorothiazide  25 mg Oral Daily     insulin aspart   Subcutaneous TID w/meals     insulin aspart  1-3 Units Subcutaneous TID AC     insulin aspart  1-3 Units Subcutaneous At Bedtime     lisinopril  20 mg Oral Daily     metoprolol succinate ER  25 mg Oral Daily     QUEtiapine  50 mg Oral At Bedtime     sodium chloride (PF)  3 mL Intracatheter Q8H     tamsulosin  0.4 mg Oral Daily        Data   Recent Labs   Lab 08/23/22  0751 08/23/22  0151 08/22/22  2055 08/21/22  1227 08/21/22  0902 08/18/22  0841 08/18/22  0218 08/17/22  0814 08/17/22  0755   WBC  --   --   --   --  7.0  --  9.0  --  8.6   HGB  --   --   --   --  12.8*  --  13.1*  --  13.3   MCV  --   --   --   --  94  --  94  --  94   PLT  --   --   --   --  232  --  226  --  295   NA  --   --   --   --  141  --  140  --  138   POTASSIUM  --   --   --   --  4.7  --  4.7  --  4.7   CHLORIDE  --   --   --   --  110*  --  109  --  108   CO2  --   --   --   --  26  --  25  --  27   BUN  --   --   --   --  67*  --  66*  --  50*   CR  --   --   --   --  1.11  --  1.42*  --  1.08   ANIONGAP  --   --   --   --  5  --  6  --  3   MAURICIO  --   --   --   --  8.4*  --  8.7  --  8.9   * 192* 148*   < > 171*   < > 180*   < > 213*    < > = values in this interval not displayed.       No results found for this or any previous visit (from the past 24 hour(s)).    Jules Welch MD  Text Page  (7am to 6pm)

## 2022-08-23 NOTE — PROGRESS NOTES
Care Management Follow Up    Length of Stay (days): 22    Expected Discharge Date: 08/24/2022     Concerns to be Addressed:       Patient plan of care discussed at interdisciplinary rounds: Yes   Pt is ready for TCU placement.  Anticipated Discharge Disposition: TCU   Anticipated Discharge Services: Transportation Services  Anticipated Discharge DME:      Patient/family educated on Medicare website which has current facility and service quality ratings:    Education Provided on the Discharge Plan:  Yes, Pt's wife called this am and updated that TCU bed not secured yet but that pt is ready to discharge once a placment can be found.  Patient/Family in Agreement with the Plan: yes    Referrals Placed by CM/SW:  SW contacted admissions at [facility] and relayed pt's referral data for assessment.  Anticipate return call from admissions SW regarding referral.  SW received return call from [facility] admissions stating that they cannot accept pt due to their current acuity on the unit.    Private pay costs discussed:     Additional Information:  SW resent referral to [facility]  for review. Wife requesting TCU close to home in [place].  SW sent referral to [facility] and resent to [facility] for review. KIMBERLY called [facility] with request over phone and faxed updated progress notes for review.    HELEN Hall  [facility]  Care Transitions  [phone]

## 2022-08-24 ENCOUNTER — APPOINTMENT (OUTPATIENT)
Dept: PHYSICAL THERAPY | Facility: CLINIC | Age: 87
DRG: 065 | End: 2022-08-24
Payer: COMMERCIAL

## 2022-08-24 ENCOUNTER — APPOINTMENT (OUTPATIENT)
Dept: OCCUPATIONAL THERAPY | Facility: CLINIC | Age: 87
DRG: 065 | End: 2022-08-24
Payer: COMMERCIAL

## 2022-08-24 LAB
GLUCOSE BLDC GLUCOMTR-MCNC: 165 MG/DL (ref 70–99)
GLUCOSE BLDC GLUCOMTR-MCNC: 188 MG/DL (ref 70–99)
GLUCOSE BLDC GLUCOMTR-MCNC: 194 MG/DL (ref 70–99)
GLUCOSE BLDC GLUCOMTR-MCNC: 220 MG/DL (ref 70–99)
GLUCOSE BLDC GLUCOMTR-MCNC: 254 MG/DL (ref 70–99)

## 2022-08-24 PROCEDURE — 250N000013 HC RX MED GY IP 250 OP 250 PS 637: Performed by: HOSPITALIST

## 2022-08-24 PROCEDURE — 97530 THERAPEUTIC ACTIVITIES: CPT | Mod: GP

## 2022-08-24 PROCEDURE — 250N000013 HC RX MED GY IP 250 OP 250 PS 637: Performed by: STUDENT IN AN ORGANIZED HEALTH CARE EDUCATION/TRAINING PROGRAM

## 2022-08-24 PROCEDURE — 97116 GAIT TRAINING THERAPY: CPT | Mod: GP

## 2022-08-24 PROCEDURE — 250N000013 HC RX MED GY IP 250 OP 250 PS 637: Performed by: INTERNAL MEDICINE

## 2022-08-24 PROCEDURE — 97530 THERAPEUTIC ACTIVITIES: CPT | Mod: GO | Performed by: OCCUPATIONAL THERAPIST

## 2022-08-24 PROCEDURE — 99232 SBSQ HOSP IP/OBS MODERATE 35: CPT | Performed by: INTERNAL MEDICINE

## 2022-08-24 PROCEDURE — 250N000011 HC RX IP 250 OP 636: Performed by: PHYSICIAN ASSISTANT

## 2022-08-24 PROCEDURE — 120N000001 HC R&B MED SURG/OB

## 2022-08-24 RX ADMIN — ATORVASTATIN CALCIUM 10 MG: 10 TABLET, FILM COATED ORAL at 22:09

## 2022-08-24 RX ADMIN — LISINOPRIL 20 MG: 20 TABLET ORAL at 09:16

## 2022-08-24 RX ADMIN — CEFTRIAXONE SODIUM 1 G: 1 INJECTION, POWDER, FOR SOLUTION INTRAMUSCULAR; INTRAVENOUS at 22:09

## 2022-08-24 RX ADMIN — INSULIN ASPART 4 UNITS: 100 INJECTION, SOLUTION INTRAVENOUS; SUBCUTANEOUS at 09:33

## 2022-08-24 RX ADMIN — APIXABAN 5 MG: 5 TABLET, FILM COATED ORAL at 09:16

## 2022-08-24 RX ADMIN — TAMSULOSIN HYDROCHLORIDE 0.4 MG: 0.4 CAPSULE ORAL at 09:16

## 2022-08-24 RX ADMIN — QUETIAPINE FUMARATE 50 MG: 50 TABLET ORAL at 22:09

## 2022-08-24 RX ADMIN — ASPIRIN 81 MG CHEWABLE TABLET 81 MG: 81 TABLET CHEWABLE at 09:16

## 2022-08-24 RX ADMIN — INSULIN ASPART 3 UNITS: 100 INJECTION, SOLUTION INTRAVENOUS; SUBCUTANEOUS at 17:56

## 2022-08-24 RX ADMIN — APIXABAN 5 MG: 5 TABLET, FILM COATED ORAL at 22:09

## 2022-08-24 RX ADMIN — INSULIN ASPART 5 UNITS: 100 INJECTION, SOLUTION INTRAVENOUS; SUBCUTANEOUS at 12:51

## 2022-08-24 RX ADMIN — METOPROLOL SUCCINATE 25 MG: 25 TABLET, EXTENDED RELEASE ORAL at 09:16

## 2022-08-24 ASSESSMENT — ACTIVITIES OF DAILY LIVING (ADL)
ADLS_ACUITY_SCORE: 52
ADLS_ACUITY_SCORE: 48
ADLS_ACUITY_SCORE: 48
ADLS_ACUITY_SCORE: 52
ADLS_ACUITY_SCORE: 48
ADLS_ACUITY_SCORE: 48
ADLS_ACUITY_SCORE: 52
ADLS_ACUITY_SCORE: 52

## 2022-08-24 NOTE — PROGRESS NOTES
Care Management Follow Up    Length of Stay (days): 23    Expected Discharge Date: 08/25/2022     Concerns to be Addressed:       Patient plan of care discussed at interdisciplinary rounds: Yes    Anticipated Discharge Disposition: Acute Rehab     Anticipated Discharge Services: Transportation Services  Anticipated Discharge DME:      Patient/family educated on Medicare website which has current facility and service quality ratings:    Education Provided on the Discharge Plan:    Patient/Family in Agreement with the Plan: yes    Referrals Placed by CM/SW:    Private pay costs discussed: not applicable      Additional Information:  Writer received call from Nancy storey inquiring if patient has received covid vaccines as it is not in epic system. Writer placed call to patient's spouse who confirms patient has received pfizer vaccine and booster (2/10/21, 3/3/21, and 11/15/21) and will bring in card today for staff to make a copy and enter in system. Spouse Cherry also stated she would like for his to get his second booster if able - writer relayed to bedside RN.     Writer updated Nancy on vaccination status and resent SNF referral per her request as they have not read updated notes recently. Writer faxed via [employer] and will await response.     Addendum 1501: Writer received follow up from Nancy storey stating RN note stated patient was delirious. Writer spoke with bedside RN who noted patient has not had any behaviors, has been sitter free with no problems, and that at baseline has some memory impairment. Nancy would like to see how patient does overnight and assess in the AM.    Writer also spoke with patient's spouse who states she called [employer] insurance to inquire if they would approve a waiver for patient to be able to admit to a TCU that is out of [facility] network - states they applied for this waiver and are awaiting results.     Will continue to follow.    LEANDRA Jarrett, LGKIMBERLY  Social  Worker  [facility]

## 2022-08-25 ENCOUNTER — APPOINTMENT (OUTPATIENT)
Dept: SPEECH THERAPY | Facility: CLINIC | Age: 87
DRG: 065 | End: 2022-08-25
Payer: COMMERCIAL

## 2022-08-25 ENCOUNTER — APPOINTMENT (OUTPATIENT)
Dept: PHYSICAL THERAPY | Facility: CLINIC | Age: 87
DRG: 065 | End: 2022-08-25
Payer: COMMERCIAL

## 2022-08-25 LAB
GLUCOSE BLDC GLUCOMTR-MCNC: 120 MG/DL (ref 70–99)
GLUCOSE BLDC GLUCOMTR-MCNC: 145 MG/DL (ref 70–99)
GLUCOSE BLDC GLUCOMTR-MCNC: 177 MG/DL (ref 70–99)
GLUCOSE BLDC GLUCOMTR-MCNC: 194 MG/DL (ref 70–99)

## 2022-08-25 PROCEDURE — 97116 GAIT TRAINING THERAPY: CPT | Mod: GP

## 2022-08-25 PROCEDURE — 120N000001 HC R&B MED SURG/OB

## 2022-08-25 PROCEDURE — 250N000013 HC RX MED GY IP 250 OP 250 PS 637: Performed by: HOSPITALIST

## 2022-08-25 PROCEDURE — 250N000013 HC RX MED GY IP 250 OP 250 PS 637: Performed by: INTERNAL MEDICINE

## 2022-08-25 PROCEDURE — 92526 ORAL FUNCTION THERAPY: CPT | Mod: GN | Performed by: SPEECH-LANGUAGE PATHOLOGIST

## 2022-08-25 PROCEDURE — 92507 TX SP LANG VOICE COMM INDIV: CPT | Mod: GN | Performed by: SPEECH-LANGUAGE PATHOLOGIST

## 2022-08-25 PROCEDURE — 99231 SBSQ HOSP IP/OBS SF/LOW 25: CPT | Performed by: INTERNAL MEDICINE

## 2022-08-25 PROCEDURE — 97530 THERAPEUTIC ACTIVITIES: CPT | Mod: GP

## 2022-08-25 PROCEDURE — 250N000013 HC RX MED GY IP 250 OP 250 PS 637: Performed by: STUDENT IN AN ORGANIZED HEALTH CARE EDUCATION/TRAINING PROGRAM

## 2022-08-25 RX ADMIN — INSULIN ASPART 9 UNITS: 100 INJECTION, SOLUTION INTRAVENOUS; SUBCUTANEOUS at 17:41

## 2022-08-25 RX ADMIN — TAMSULOSIN HYDROCHLORIDE 0.4 MG: 0.4 CAPSULE ORAL at 09:26

## 2022-08-25 RX ADMIN — INSULIN ASPART 4 UNITS: 100 INJECTION, SOLUTION INTRAVENOUS; SUBCUTANEOUS at 09:30

## 2022-08-25 RX ADMIN — METOPROLOL SUCCINATE 25 MG: 25 TABLET, EXTENDED RELEASE ORAL at 09:26

## 2022-08-25 RX ADMIN — ASPIRIN 81 MG CHEWABLE TABLET 81 MG: 81 TABLET CHEWABLE at 09:25

## 2022-08-25 RX ADMIN — ATORVASTATIN CALCIUM 10 MG: 10 TABLET, FILM COATED ORAL at 19:46

## 2022-08-25 RX ADMIN — LISINOPRIL 20 MG: 20 TABLET ORAL at 09:26

## 2022-08-25 RX ADMIN — QUETIAPINE FUMARATE 50 MG: 50 TABLET ORAL at 21:16

## 2022-08-25 RX ADMIN — APIXABAN 5 MG: 5 TABLET, FILM COATED ORAL at 21:16

## 2022-08-25 RX ADMIN — INSULIN ASPART 4 UNITS: 100 INJECTION, SOLUTION INTRAVENOUS; SUBCUTANEOUS at 13:42

## 2022-08-25 RX ADMIN — APIXABAN 5 MG: 5 TABLET, FILM COATED ORAL at 09:26

## 2022-08-25 ASSESSMENT — ACTIVITIES OF DAILY LIVING (ADL)
ADLS_ACUITY_SCORE: 52
ADLS_ACUITY_SCORE: 57
ADLS_ACUITY_SCORE: 56
ADLS_ACUITY_SCORE: 57
ADLS_ACUITY_SCORE: 57
ADLS_ACUITY_SCORE: 56
ADLS_ACUITY_SCORE: 57
ADLS_ACUITY_SCORE: 56
ADLS_ACUITY_SCORE: 57
ADLS_ACUITY_SCORE: 56

## 2022-08-25 NOTE — PROGRESS NOTES
"Care Management Follow Up    Length of Stay (days): 24    Expected Discharge Date: 08/26/2022     Concerns to be Addressed:       Patient plan of care discussed at interdisciplinary rounds: Yes    Anticipated Discharge Disposition: Acute Rehab     Anticipated Discharge Services: Transportation Services  Anticipated Discharge DME:      Patient/family educated on Medicare website which has current facility and service quality ratings:    Education Provided on the Discharge Plan:    Patient/Family in Agreement with the Plan: yes    Referrals Placed by CM/SW:    Private pay costs discussed: Not applicable    Additional Information:  Writer followed up with Nancy Aleman liaison to inquire status of assessment for TCU placement - awaiting call from Nancy.     Patient's spouse also reached out to insurance to inquire if they would approve of a waiver to allow for patient to discharge to an out of network (non- [facility]) facility for coverage. Patient's spouse provided writer with the insurance response     \"If there are no In Network facilities willing to accept the member within a reasonable distance. We can approve for an Out of network facility. The facility would need to request and [facility].     [facility] is who the hospital did the first auth with so they would just call them and explain the situation. Then ask for an out of network skilled care be approved as in network. \"    Patient's spouse requested that a referral be sent to [facility] as this is her preferred location. Writer sent via [employer] and updated them on the above message.     Will continue to follow.    LEANDRA Jarrett, LGSW    [facility]          "

## 2022-08-26 ENCOUNTER — APPOINTMENT (OUTPATIENT)
Dept: PHYSICAL THERAPY | Facility: CLINIC | Age: 87
DRG: 065 | End: 2022-08-26
Payer: COMMERCIAL

## 2022-08-26 ENCOUNTER — APPOINTMENT (OUTPATIENT)
Dept: SPEECH THERAPY | Facility: CLINIC | Age: 87
DRG: 065 | End: 2022-08-26
Payer: COMMERCIAL

## 2022-08-26 LAB
GLUCOSE BLDC GLUCOMTR-MCNC: 146 MG/DL (ref 70–99)
GLUCOSE BLDC GLUCOMTR-MCNC: 150 MG/DL (ref 70–99)
GLUCOSE BLDC GLUCOMTR-MCNC: 165 MG/DL (ref 70–99)
GLUCOSE BLDC GLUCOMTR-MCNC: 176 MG/DL (ref 70–99)
GLUCOSE BLDC GLUCOMTR-MCNC: 189 MG/DL (ref 70–99)

## 2022-08-26 PROCEDURE — 250N000013 HC RX MED GY IP 250 OP 250 PS 637: Performed by: HOSPITALIST

## 2022-08-26 PROCEDURE — 92526 ORAL FUNCTION THERAPY: CPT | Mod: GN | Performed by: SPEECH-LANGUAGE PATHOLOGIST

## 2022-08-26 PROCEDURE — 120N000001 HC R&B MED SURG/OB

## 2022-08-26 PROCEDURE — 92507 TX SP LANG VOICE COMM INDIV: CPT | Mod: GN | Performed by: SPEECH-LANGUAGE PATHOLOGIST

## 2022-08-26 PROCEDURE — 250N000013 HC RX MED GY IP 250 OP 250 PS 637: Performed by: INTERNAL MEDICINE

## 2022-08-26 PROCEDURE — 99231 SBSQ HOSP IP/OBS SF/LOW 25: CPT | Performed by: INTERNAL MEDICINE

## 2022-08-26 PROCEDURE — 250N000013 HC RX MED GY IP 250 OP 250 PS 637: Performed by: STUDENT IN AN ORGANIZED HEALTH CARE EDUCATION/TRAINING PROGRAM

## 2022-08-26 PROCEDURE — 97116 GAIT TRAINING THERAPY: CPT | Mod: GP

## 2022-08-26 RX ADMIN — TAMSULOSIN HYDROCHLORIDE 0.4 MG: 0.4 CAPSULE ORAL at 10:05

## 2022-08-26 RX ADMIN — INSULIN ASPART 3 UNITS: 100 INJECTION, SOLUTION INTRAVENOUS; SUBCUTANEOUS at 18:11

## 2022-08-26 RX ADMIN — ATORVASTATIN CALCIUM 10 MG: 10 TABLET, FILM COATED ORAL at 20:52

## 2022-08-26 RX ADMIN — APIXABAN 5 MG: 5 TABLET, FILM COATED ORAL at 10:05

## 2022-08-26 RX ADMIN — QUETIAPINE FUMARATE 50 MG: 50 TABLET ORAL at 20:52

## 2022-08-26 RX ADMIN — LISINOPRIL 20 MG: 20 TABLET ORAL at 10:05

## 2022-08-26 RX ADMIN — INSULIN ASPART 1 UNITS: 100 INJECTION, SOLUTION INTRAVENOUS; SUBCUTANEOUS at 10:05

## 2022-08-26 RX ADMIN — METOPROLOL SUCCINATE 25 MG: 25 TABLET, EXTENDED RELEASE ORAL at 10:05

## 2022-08-26 RX ADMIN — INSULIN ASPART 2 UNITS: 100 INJECTION, SOLUTION INTRAVENOUS; SUBCUTANEOUS at 13:03

## 2022-08-26 RX ADMIN — ASPIRIN 81 MG CHEWABLE TABLET 81 MG: 81 TABLET CHEWABLE at 10:05

## 2022-08-26 RX ADMIN — APIXABAN 5 MG: 5 TABLET, FILM COATED ORAL at 20:52

## 2022-08-26 ASSESSMENT — ACTIVITIES OF DAILY LIVING (ADL)
ADLS_ACUITY_SCORE: 53
ADLS_ACUITY_SCORE: 53
ADLS_ACUITY_SCORE: 55
ADLS_ACUITY_SCORE: 53

## 2022-08-26 NOTE — PROGRESS NOTES
Reason for Admission: Right ischemic stroke  Orientation/Cognitive:A&O  To self and time, garbled speech  Mobility Level/Assist Equipment: Ax2 GB walker  Fall Risk (Y/N): Y  Behavior Concerns:redirectible when trying to get out of bed  Pain Management:Denies pain  Tele/VS/O2: VSS on RA  ABNL Lab/BG:B  Diet: Minced and moist w/ mod thick liquids   Bowel/Bladder: Incontinent at times, uses the urinal   Skin Concerns: Scattered scabs and bruises  Drains/Devices: PIV:S/L  Tests/Procedures for next shift: NA  Anticipated DC date & active delays: Awaiting TCU placement

## 2022-08-26 NOTE — PROGRESS NOTES
"CLINICAL NUTRITION SERVICES - REASSESSMENT NOTE      Recommendations Ordered by Registered Dietitian (RD):     Pt to receive daily assistance with meal ordering    Will continue to send magic cup with dinner meal - change flavor to berry       Malnutrition: (8/22)  % Weight Loss:  None noted  % Intake:  <75% for > 7 days (moderate malnutrition)  Subcutaneous Fat Loss:  None observed per RD 8/8  Muscle Loss:  None observed per RD 8/8, suspect some component of age related sarcopenia though this does not meet malnutrition criteria   Fluid Retention:  None noted     Malnutrition Diagnosis: Patient does not meet two of the above criteria necessary for diagnosing malnutrition       EVALUATION OF PROGRESS TOWARD GOALS   Diet:    Moderate CHO, Minced/Moist (L5), Moderately Thick liquids (L3)  Room Service with Assist  Supplement ---> Dinner: vanilla Magic Cup    Intake/Tolerance:    Chart reviewed  Per flowsheets, meal intake over the past few days has been %  Has been ordering meals TID    Visited with pt this afternoon - sitting up in hi chair  Tells me that he just finished lunch - still drinking his milk - \"this stuff is thicker than cream!\"  Notes that he is taking the magic cup - \"don't really have anything against it\"  Would like to try the berry flavored magic cup      NEW FINDINGS:     TCU pending placement    No wt since admit      Previous Goals (8/22):   Pt to consume 75% meals and 100% of the supplement  Evaluation: Met    Previous Nutrition Diagnosis (8/22):   No nutrition diagnosis identified at this time   Evaluation: No change        CURRENT NUTRITION DIAGNOSIS  No nutrition diagnosis identified at this time     INTERVENTIONS  Recommendations / Nutrition Prescription  Minced/Moist (L5), Moderately thick liquids (L3)    Pt to receive daily assistance with meal ordering    Will continue to send magic cup with dinner meal - change flavor to berry      Goals  Pt to consume 75% meals      MONITORING AND " EVALUATION:  Progress towards goals will be monitored and evaluated per protocol and Practice Guidelines

## 2022-08-26 NOTE — PROGRESS NOTES
[facility]  Hospitalist Progress Note  Jules Welch MD  08/26/2022    Assessment & Plan   Nathan Macias is a 89 year old male with a history of afib on Eliquis who was brought to the ED with dysarthria, aphasia and ataxia and noted to have an acute ischemic stroke of the RIGHT cerebellum. He is admitted for further evaluation and management.      Acute Ischemic stroke of RIGHT cerebellum  - TTE with normal EF, indeterminate diastolic function and neg bubble.  - Expressive aphasia (persistent), not a candidate for stroke intervention.  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 No evidence of malignancy on screen.   - Exam per neuro;  dysarthria, decreased JAGDISH in RUE and RLE, ataxia with finger to nose and heel to shin with RUE and RLE. normal on the left  - dysarthria and ataxia with very slow improvement, Strength intact. Taking po.   - sitter since fall on 8/6 but has been without sitter for a number of days, no restraints  - head ct post fall stable.   - Continue PTA statin. Cont PtA dose as LDL 40   - Cont PTA BP meds, eliquis  - Secondary stroke prevention with apixaban + ASA 81 mg daily x90 days, then just apixaban after that.   - PT/OT/SLP recommending TCU  - Long term goals < 130/80, LDL goal <70, A1c goal <7  - Follow up with general neurology in 6-8 weeks   - repeat CTH non contrast showed continued evolution of right cerebellar infarction  - making clinical improvement with improved aphasia and less delirium and agitation.  - he walked down the causey with walker and following full commands    Delirium - resolved  Probable underlyng cognitive issues.  Wife reports that the patient has been having issues with short-term memories for about a year (forgetting keys and groceries etc).  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 Needed sitter started.   -needed prn zyprex  aon 8/2 and 8/3.   Impulsive. Pulled out iv, pulled at catheter.   8/4 No agitation, cooperative. Remains impulsive at times.   ucx ngtd  Cefuroxime for possible uti- stopped given ucx ngt   -add qpm meletonin  - Delirium bundle, decrease frequency of vitals and telemetry  - Feliz as below for retention.  - Formal cognitive testing as outpatient.  - had fall night on 8/17   - zyprexa discontinued and continue on seroquel at bedtime and prn.      Unwitnessed fall 8/6 and 8/17  No neuro changes. No change in status prefall  Bed alarm not on or not working  Pt is ataxic from stroke  No injury. Hb stable  Bun.cr elevated, given fluid bolus and maint fluids will SL  -Cr better with fluids  Had fall night of 8/17, seen by house ashleigh, labs stable, CTH showed nothing acute    HERNAN.   prerenal  Hb stable.   Held ace inh and hydrochlorothiazide  Cr at baseline   Discontinue hydrochlorothiazide in the future     Atrial fibrillation  - continue BB and apixaban.     DMII. A1C is 7.1.   range today range.   - hold metformin while inpatient  - sliding scale insulin  - added prandial aspart --> increased to 1 unit per 10 gram carbs 8/6, adjust as needed.      Hypertension  Good control  - discontinued hydrochlorothiazide   - continue lisinopril 20 mg daily  - BP stable on metoprolol 25 mg daily.    Cough  Sore throat:  No infection seen on CT chest.Neg for strep AG.  Afebrile.   - Cepacol. Prn.  - Prn antitussives.  - IS.    Prostatomegaly on CT (unsure if new but was on flomax PTA).  Urinary retention causing frequent cath.  Feliz traums 8/15  Hx of tx for bph and LUTS for 2 years with flomax  UA 8/3: 37 wbc, 176 rbc  - Indewelling feliz.  - Continue flomax.  - Bladder scan given confusion.  - Urology consult. Seen 8/3, rec feliz and consider voiding trial in several days. Cont flomax. CT scan reviewed and agree with radiology interpretation . No indication for urgent urologic intervention. Urology signed off  - follow up ucx  "shows no growth  - consider voiding trial in 2-3 days, if discharge with feliz outpatient urology follow up  [facility]rology or at [facility]rology  - pulled at catheter. Some blood in feliz. No clots. RN to flush, follow closely.   -8/5, still with bloody urine, ucx ngtd.    - 8/10 UA abnormal, UCx negative, discontinue empiric iv ceftriaxone, keep penile tip around feliz catheter clean.  - patient noted to have coke color urine, ua + hematuria, now clearing  - penile discharge again noted, UA abnormal 8/19 and started on ceftriaxone pending urine cultures NGTD  - discontinued feliz   - needing intermittent straight cath    Pulmonary nodule: 3mm, accidental.  - Repeat CT in 12 months.  - pcp follow up      DVT Prophylaxis: apixaban.    Code Status: Full Code     Disposition:   -- sw notes reviewed, awaiting [employer] approval for out of network TCU  -- when TCU bed available.  -- updated wife Cherry on 8/26    Interval History   -- stable and cooperative  -- discussed with CC    -Data reviewed today: I reviewed all new labs and imaging over the last 24 hours. I personally reviewed no images or EKG's today.    Physical Exam    , Blood pressure 133/60, pulse 73, temperature 96.8  F (36  C), temperature source Axillary, resp. rate 16, height 1.715 m (5' 7.5\"), weight 84 kg (185 lb 3 oz), SpO2 96 %.  Vitals:    08/01/22 0944   Weight: 84 kg (185 lb 3 oz)     Vital Signs with Ranges  Temp:  [96.8  F (36  C)-97.4  F (36.3  C)] 96.8  F (36  C)  Pulse:  [52-73] 73  Resp:  [15-16] 16  BP: (125-133)/(54-60) 133/60  SpO2:  [96 %-98 %] 96 %  I/O's Last 24 hours  I/O last 3 completed shifts:  In: 240 [P.O.:240]  Out: 200 [Urine:200]    Constitutional:  More alert, less dysarthric, follows commands  Respiratory: Clear to auscultation bilaterally, no crackles or wheezing  Cardiovascular: Regular rate and rhythm, normal S1 and S2   GI: Normal bowel sounds, soft, non-distended, non-tender  Skin/Integumen: No rashes, no cyanosis, no " edema  Other: Bilateral UE ataxia, dysarthria     Medications   All medications were reviewed.    - MEDICATION INSTRUCTIONS -       - MEDICATION INSTRUCTIONS -       - MEDICATION INSTRUCTIONS -         apixaban ANTICOAGULANT  5 mg Oral BID     aspirin  81 mg Oral Daily     atorvastatin  10 mg Oral or Feeding Tube QPM     [Held by provider] hydrochlorothiazide  25 mg Oral Daily     insulin aspart   Subcutaneous TID w/meals     insulin aspart  1-3 Units Subcutaneous TID AC     insulin aspart  1-3 Units Subcutaneous At Bedtime     lisinopril  20 mg Oral Daily     metoprolol succinate ER  25 mg Oral Daily     QUEtiapine  50 mg Oral At Bedtime     sodium chloride (PF)  3 mL Intracatheter Q8H     tamsulosin  0.4 mg Oral Daily        Data   Recent Labs   Lab 08/26/22  0742 08/26/22  0149 08/25/22  2122 08/21/22  1227 08/21/22  0902   WBC  --   --   --   --  7.0   HGB  --   --   --   --  12.8*   MCV  --   --   --   --  94   PLT  --   --   --   --  232   NA  --   --   --   --  141   POTASSIUM  --   --   --   --  4.7   CHLORIDE  --   --   --   --  110*   CO2  --   --   --   --  26   BUN  --   --   --   --  67*   CR  --   --   --   --  1.11   ANIONGAP  --   --   --   --  5   MAURICIO  --   --   --   --  8.4*   * 146* 145*   < > 171*    < > = values in this interval not displayed.       No results found for this or any previous visit (from the past 24 hour(s)).    Jules Welch MD  Text Page  (7am to 6pm)

## 2022-08-27 ENCOUNTER — APPOINTMENT (OUTPATIENT)
Dept: OCCUPATIONAL THERAPY | Facility: CLINIC | Age: 87
DRG: 065 | End: 2022-08-27
Payer: COMMERCIAL

## 2022-08-27 ENCOUNTER — APPOINTMENT (OUTPATIENT)
Dept: SPEECH THERAPY | Facility: CLINIC | Age: 87
DRG: 065 | End: 2022-08-27
Payer: COMMERCIAL

## 2022-08-27 LAB
GLUCOSE BLDC GLUCOMTR-MCNC: 138 MG/DL (ref 70–99)
GLUCOSE BLDC GLUCOMTR-MCNC: 157 MG/DL (ref 70–99)
GLUCOSE BLDC GLUCOMTR-MCNC: 157 MG/DL (ref 70–99)
GLUCOSE BLDC GLUCOMTR-MCNC: 206 MG/DL (ref 70–99)
GLUCOSE BLDC GLUCOMTR-MCNC: 257 MG/DL (ref 70–99)

## 2022-08-27 PROCEDURE — 250N000013 HC RX MED GY IP 250 OP 250 PS 637: Performed by: STUDENT IN AN ORGANIZED HEALTH CARE EDUCATION/TRAINING PROGRAM

## 2022-08-27 PROCEDURE — 250N000013 HC RX MED GY IP 250 OP 250 PS 637: Performed by: INTERNAL MEDICINE

## 2022-08-27 PROCEDURE — 92526 ORAL FUNCTION THERAPY: CPT | Mod: GN | Performed by: SPEECH-LANGUAGE PATHOLOGIST

## 2022-08-27 PROCEDURE — 120N000001 HC R&B MED SURG/OB

## 2022-08-27 PROCEDURE — 97535 SELF CARE MNGMENT TRAINING: CPT | Mod: GO

## 2022-08-27 PROCEDURE — 92507 TX SP LANG VOICE COMM INDIV: CPT | Mod: GN | Performed by: SPEECH-LANGUAGE PATHOLOGIST

## 2022-08-27 PROCEDURE — 99232 SBSQ HOSP IP/OBS MODERATE 35: CPT | Performed by: HOSPITALIST

## 2022-08-27 PROCEDURE — 250N000013 HC RX MED GY IP 250 OP 250 PS 637: Performed by: HOSPITALIST

## 2022-08-27 PROCEDURE — 250N000011 HC RX IP 250 OP 636: Performed by: INTERNAL MEDICINE

## 2022-08-27 RX ADMIN — ASPIRIN 81 MG CHEWABLE TABLET 81 MG: 81 TABLET CHEWABLE at 08:05

## 2022-08-27 RX ADMIN — METOPROLOL SUCCINATE 25 MG: 25 TABLET, EXTENDED RELEASE ORAL at 08:04

## 2022-08-27 RX ADMIN — LISINOPRIL 20 MG: 20 TABLET ORAL at 08:05

## 2022-08-27 RX ADMIN — TAMSULOSIN HYDROCHLORIDE 0.4 MG: 0.4 CAPSULE ORAL at 08:04

## 2022-08-27 RX ADMIN — ACETAMINOPHEN 1000 MG: 500 TABLET, FILM COATED ORAL at 16:25

## 2022-08-27 RX ADMIN — APIXABAN 5 MG: 5 TABLET, FILM COATED ORAL at 08:04

## 2022-08-27 RX ADMIN — OLANZAPINE 5 MG: 10 INJECTION, POWDER, FOR SOLUTION INTRAMUSCULAR at 21:02

## 2022-08-27 RX ADMIN — INSULIN ASPART 4 UNITS: 100 INJECTION, SOLUTION INTRAVENOUS; SUBCUTANEOUS at 13:22

## 2022-08-27 RX ADMIN — INSULIN ASPART 3 UNITS: 100 INJECTION, SOLUTION INTRAVENOUS; SUBCUTANEOUS at 18:04

## 2022-08-27 ASSESSMENT — ACTIVITIES OF DAILY LIVING (ADL)
ADLS_ACUITY_SCORE: 51
ADLS_ACUITY_SCORE: 50
ADLS_ACUITY_SCORE: 51
ADLS_ACUITY_SCORE: 55
ADLS_ACUITY_SCORE: 50
ADLS_ACUITY_SCORE: 51
ADLS_ACUITY_SCORE: 50
ADLS_ACUITY_SCORE: 51
ADLS_ACUITY_SCORE: 55
ADLS_ACUITY_SCORE: 51

## 2022-08-27 NOTE — CODE/RAPID RESPONSE
Pt alert and engaging with staff this AM/afternoon. Pt friendly and cooperative. Up in chair most of shift watching tv and speaking with wife on phone.  This afternoon, BP was noted to be 74/35. Pt was sitting up in recliner, but unresponsive to stimuli initially. Pt did respond to sternal rub after 3 minutes by pushing staff away, but again kept eyes closed and would not respond to staff attempts to wake. After 5 minutes, pt beginning drooling and spitting, then attempting to get out of chair but again with eyes closed and no verbal response to staff. RRT was called for noted change in pt condition from AM shift.

## 2022-08-27 NOTE — PROGRESS NOTES
[facility]    Hospitalist Progress Note    Interval History     Patient awake and alert.  Has severe expressive aphasia.  So orientation questions or review of systems could not be done.  No other focal deficits.  Gait not assessed.  No new complaints.    -Data reviewed today: I reviewed all new labs and imaging results over the last 24 hours. I personally reviewed CT head and MRI brain that show cerebellar stroke    Physical Exam   Temp: 98.7  F (37.1  C) Temp src: Oral BP: 130/67 Pulse: 57   Resp: 16 SpO2: 97 % O2 Device: None (Room air)    Vitals:    08/01/22 0944   Weight: 84 kg (185 lb 3 oz)     Vital Signs with Ranges  Temp:  [96.9  F (36.1  C)-98.9  F (37.2  C)] 98.7  F (37.1  C)  Pulse:  [57-70] 57  Resp:  [16-18] 16  BP: ()/(51-70) 130/67  SpO2:  [94 %-98 %] 97 %  I/O last 3 completed shifts:  In: 610 [P.O.:610]  Out: 275 [Urine:275]    Physical Exam  Constitutional:       Appearance: Normal appearance.   HENT:      Head: Normocephalic.   Eyes:      Pupils: Pupils are equal, round, and reactive to light.   Cardiovascular:      Rate and Rhythm: Normal rate and regular rhythm.      Pulses: Normal pulses.      Heart sounds: Normal heart sounds.   Pulmonary:      Effort: Pulmonary effort is normal. No respiratory distress.      Breath sounds: Normal breath sounds.   Abdominal:      General: Abdomen is flat. Bowel sounds are normal. There is no distension.      Tenderness: There is no abdominal tenderness. There is no guarding.   Musculoskeletal:         General: Normal range of motion.      Cervical back: Normal range of motion.   Skin:     General: Skin is warm and dry.   Neurological:      General: No focal deficit present.      Comments: Expressive aphasia   Psychiatric:         Mood and Affect: Mood normal.           Medications     - MEDICATION INSTRUCTIONS -       - MEDICATION INSTRUCTIONS -       - MEDICATION INSTRUCTIONS -         apixaban ANTICOAGULANT  5 mg Oral BID      aspirin  81 mg Oral Daily     atorvastatin  10 mg Oral or Feeding Tube QPM     [Held by provider] hydrochlorothiazide  25 mg Oral Daily     insulin aspart   Subcutaneous TID w/meals     insulin aspart  1-3 Units Subcutaneous TID AC     insulin aspart  1-3 Units Subcutaneous At Bedtime     lisinopril  20 mg Oral Daily     metoprolol succinate ER  25 mg Oral Daily     QUEtiapine  50 mg Oral At Bedtime     sodium chloride (PF)  3 mL Intracatheter Q8H     tamsulosin  0.4 mg Oral Daily       Data   Recent Labs   Lab 08/27/22  1127 08/27/22  0837 08/26/22  2149 08/21/22  1227 08/21/22  0902   WBC  --   --   --   --  7.0   HGB  --   --   --   --  12.8*   MCV  --   --   --   --  94   PLT  --   --   --   --  232   NA  --   --   --   --  141   POTASSIUM  --   --   --   --  4.7   CHLORIDE  --   --   --   --  110*   CO2  --   --   --   --  26   BUN  --   --   --   --  67*   CR  --   --   --   --  1.11   ANIONGAP  --   --   --   --  5   MAURICIO  --   --   --   --  8.4*   * 157* 189*   < > 171*    < > = values in this interval not displayed.       No results found for this or any previous visit (from the past 24 hour(s)).      Assessment & Plan   Nathan Macias is a 89 year old male with a history of afib on Eliquis who was brought to the ED with dysarthria, aphasia and ataxia and noted to have an acute ischemic stroke of the RIGHT cerebellum. He is admitted for further evaluation and management.      Acute Ischemic stroke of RIGHT cerebellum  - TTE with normal EF, indeterminate diastolic function and neg bubble.  - Expressive aphasia (persistent), not a candidate for stroke intervention.  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 No evidence of malignancy on screen.   - Exam per neuro;  dysarthria, decreased JAGDISH in RUE and RLE, ataxia with finger to nose and heel to shin with RUE and RLE. normal on the left  - dysarthria and ataxia with very  slow improvement, Strength intact. Taking po.   - sitter since fall on 8/6 but has been without sitter for a number of days, no restraints  - head ct post fall stable.   - Continue PTA statin. Cont PtA dose as LDL 40   - Cont PTA BP meds, eliquis  - Secondary stroke prevention with apixaban + ASA 81 mg daily x90 days, then just apixaban after that.   - PT/OT/SLP recommending TCU  - Long term goals < 130/80, LDL goal <70, A1c goal <7  - Follow up with general neurology in 6-8 weeks   - repeat CTH non contrast showed continued evolution of right cerebellar infarction  - making clinical improvement with improved aphasia and less delirium and agitation.  - he walked down the causey with walker and following full commands.  He is currently assist of 2.    Delirium - resolved  Probable underlyng cognitive issues.  Wife reports that the patient has been having issues with short-term memories for about a year (forgetting keys and groceries etc).  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 Needed sitter started.   -needed prn zyprex aon 8/2 and 8/3.   Impulsive. Pulled out iv, pulled at catheter.   8/4 No agitation, cooperative. Remains impulsive at times.   ucx ngtd  Cefuroxime for possible uti- stopped given ucx ngt   -add qpm meletonin  - Delirium bundle, decrease frequency of vitals and telemetry  - [place] as below for retention.  - Formal cognitive testing as outpatient.  - had fall night on 8/17   - zyprexa discontinued and continue on seroquel at bedtime and prn.      Unwitnessed fall 8/6 and 8/17  No neuro changes. No change in status prefall  Bed alarm not on or not working  Pt is ataxic from stroke  No injury. Hb stable  Bun.cr elevated, given fluid bolus and maint fluids will SL  -Cr better with fluids  Had fall night of 8/17, seen by house ashleigh, labs stable, CTH showed nothing acute    HERNAN.   prerenal  Hb stable.   Held ace inh and hydrochlorothiazide  Cr at baseline   Discontinue  hydrochlorothiazide in the future     Atrial fibrillation  - continue BB and apixaban.     DMII. A1C is 7.1.   range today range.   - hold metformin while inpatient  - sliding scale insulin  - added prandial aspart --> increased to 1 unit per 10 gram carbs 8/6, adjust as needed.      Hypertension  Good control  - discontinued hydrochlorothiazide   - continue lisinopril 20 mg daily  - BP stable on metoprolol 25 mg daily.    Cough  Sore throat:  No infection seen on CT chest.Neg for strep AG.  Afebrile.   - Cepacol. Prn.  - Prn antitussives.  - IS.    Prostatomegaly on CT (unsure if new but was on flomax PTA).  Urinary retention causing frequent cath.  Feliz traums 8/15  Hx of tx for bph and LUTS for 2 years with flomax  UA 8/3: 37 wbc, 176 rbc  - Indewelling feliz.  - Continue flomax.  - Bladder scan given confusion.  - Urology consult. Seen 8/3, rec feliz and consider voiding trial in several days. Cont flomax. CT scan reviewed and agree with radiology interpretation . No indication for urgent urologic intervention. Urology signed off  - follow up ucx shows no growth  - consider voiding trial in 2-3 days, if discharge with feliz outpatient urology follow up  [facility] urology or at [facility] urology  - pulled at catheter. Some blood in feliz. No clots. RN to flush, follow closely.   -8/5, still with bloody urine, ucx ngtd.    - 8/10 UA abnormal, UCx negative, discontinue empiric iv ceftriaxone, keep penile tip around feliz catheter clean.  - patient noted to have coke color urine, ua + hematuria, now clearing  - penile discharge again noted, UA abnormal 8/19 and started on ceftriaxone pending urine cultures NGTD  - discontinued feliz   - needing intermittent straight cath    Pulmonary nodule: 3mm, accidental.  - Repeat CT in 12 months.  - pcp follow up      DVT Prophylaxis: apixaban.    Code Status: Full Code     Disposition:   -- sw notes reviewed, awaiting [employer] approval for out of network TCU  -- when TCU bed  available.  -- updated angel Carey on 8/26          DVT Prophylaxis: Pneumatic Compression Devices  Code Status: Full Code  Disposition: Expected discharge when TCU bed available      Lindsay Lou MD, MD  [phone](p) [phone]( c)

## 2022-08-27 NOTE — PROGRESS NOTES
Care Management Follow Up    Length of Stay (days): 26    Expected Discharge Date: 08/27/2022     Concerns to be Addressed:       Patient plan of care discussed at interdisciplinary rounds: Yes    Anticipated Discharge Disposition: Transitional Care     Anticipated Discharge Services: Transportation Services  Anticipated Discharge DME:      Referrals Placed by CM/SW:    Private pay costs discussed:     Additional Information:  SW contacted [facility] liaison regarding pt's referral. Last update liason received was on the 26yh. She will reach out to the team to clarify if any available beds near [place] for pt placement. Authorization will have to wait until Monday anyway as pt is [employer].    HELEN Hall  [facility]  Care Transitions  [phone]

## 2022-08-28 LAB
GLUCOSE BLDC GLUCOMTR-MCNC: 137 MG/DL (ref 70–99)
GLUCOSE BLDC GLUCOMTR-MCNC: 148 MG/DL (ref 70–99)
GLUCOSE BLDC GLUCOMTR-MCNC: 163 MG/DL (ref 70–99)

## 2022-08-28 PROCEDURE — 250N000012 HC RX MED GY IP 250 OP 636 PS 637: Performed by: INTERNAL MEDICINE

## 2022-08-28 PROCEDURE — 250N000013 HC RX MED GY IP 250 OP 250 PS 637: Performed by: STUDENT IN AN ORGANIZED HEALTH CARE EDUCATION/TRAINING PROGRAM

## 2022-08-28 PROCEDURE — 250N000013 HC RX MED GY IP 250 OP 250 PS 637: Performed by: HOSPITALIST

## 2022-08-28 PROCEDURE — 99233 SBSQ HOSP IP/OBS HIGH 50: CPT | Performed by: HOSPITALIST

## 2022-08-28 PROCEDURE — 250N000013 HC RX MED GY IP 250 OP 250 PS 637: Performed by: INTERNAL MEDICINE

## 2022-08-28 PROCEDURE — 120N000001 HC R&B MED SURG/OB

## 2022-08-28 RX ADMIN — ATORVASTATIN CALCIUM 10 MG: 10 TABLET, FILM COATED ORAL at 21:10

## 2022-08-28 RX ADMIN — METOPROLOL SUCCINATE 25 MG: 25 TABLET, EXTENDED RELEASE ORAL at 09:25

## 2022-08-28 RX ADMIN — QUETIAPINE FUMARATE 50 MG: 50 TABLET ORAL at 21:10

## 2022-08-28 RX ADMIN — INSULIN ASPART 4 UNITS: 100 INJECTION, SOLUTION INTRAVENOUS; SUBCUTANEOUS at 18:35

## 2022-08-28 RX ADMIN — LISINOPRIL 20 MG: 20 TABLET ORAL at 09:26

## 2022-08-28 RX ADMIN — INSULIN ASPART 1 UNITS: 100 INJECTION, SOLUTION INTRAVENOUS; SUBCUTANEOUS at 10:48

## 2022-08-28 RX ADMIN — APIXABAN 5 MG: 5 TABLET, FILM COATED ORAL at 09:25

## 2022-08-28 RX ADMIN — INSULIN ASPART 1 UNITS: 100 INJECTION, SOLUTION INTRAVENOUS; SUBCUTANEOUS at 13:31

## 2022-08-28 RX ADMIN — APIXABAN 5 MG: 5 TABLET, FILM COATED ORAL at 21:10

## 2022-08-28 RX ADMIN — ASPIRIN 81 MG CHEWABLE TABLET 81 MG: 81 TABLET CHEWABLE at 09:25

## 2022-08-28 RX ADMIN — TAMSULOSIN HYDROCHLORIDE 0.4 MG: 0.4 CAPSULE ORAL at 09:25

## 2022-08-28 ASSESSMENT — ACTIVITIES OF DAILY LIVING (ADL)
ADLS_ACUITY_SCORE: 50
ADLS_ACUITY_SCORE: 53
ADLS_ACUITY_SCORE: 50
ADLS_ACUITY_SCORE: 53
ADLS_ACUITY_SCORE: 50
ADLS_ACUITY_SCORE: 53

## 2022-08-28 NOTE — PROGRESS NOTES
[facility]    Hospitalist Progress Note    Interval History     Patient awake and alert.  Had an episode of hypotension yesterday which was likely false result since he normalized with his blood pressure right away.  He also had an episode of emesis last night and an episode of left upper quadrant pain but denies any acute complaints currently.  Continues to have severe expressive aphasia but intermittently answers questions appropriately and follows commands.    -Data reviewed today: I reviewed all new labs and imaging results over the last 24 hours. I personally reviewed CT head and MRI brain that show cerebellar stroke    Physical Exam   Temp: 97.1  F (36.2  C) Temp src: Oral BP: 113/51 Pulse: 61   Resp: 16 SpO2: 95 % O2 Device: None (Room air)    Vitals:    08/01/22 0944   Weight: 84 kg (185 lb 3 oz)     Vital Signs with Ranges  Temp:  [97  F (36.1  C)-98  F (36.7  C)] 97.1  F (36.2  C)  Pulse:  [55-86] 61  Resp:  [16] 16  BP: ()/(35-78) 113/51  SpO2:  [95 %-98 %] 95 %  I/O last 3 completed shifts:  In: 570 [P.O.:570]  Out: 400 [Urine:400]    Physical Exam  Constitutional:       Appearance: Normal appearance.   HENT:      Head: Normocephalic.   Eyes:      Pupils: Pupils are equal, round, and reactive to light.   Cardiovascular:      Rate and Rhythm: Normal rate and regular rhythm.      Pulses: Normal pulses.      Heart sounds: Normal heart sounds.   Pulmonary:      Effort: Pulmonary effort is normal. No respiratory distress.      Breath sounds: Normal breath sounds.   Abdominal:      General: Abdomen is flat. Bowel sounds are normal. There is no distension.      Tenderness: There is no abdominal tenderness. There is no guarding.   Musculoskeletal:         General: Normal range of motion.      Cervical back: Normal range of motion.   Skin:     General: Skin is warm and dry.   Neurological:      General: No focal deficit present.      Comments: Expressive aphasia   Psychiatric:          Mood and Affect: Mood normal.           Medications     - MEDICATION INSTRUCTIONS -       - MEDICATION INSTRUCTIONS -       - MEDICATION INSTRUCTIONS -         apixaban ANTICOAGULANT  5 mg Oral BID     aspirin  81 mg Oral Daily     atorvastatin  10 mg Oral or Feeding Tube QPM     [Held by provider] hydrochlorothiazide  25 mg Oral Daily     insulin aspart   Subcutaneous TID w/meals     insulin aspart  1-3 Units Subcutaneous TID AC     insulin aspart  1-3 Units Subcutaneous At Bedtime     lisinopril  20 mg Oral Daily     metoprolol succinate ER  25 mg Oral Daily     QUEtiapine  50 mg Oral At Bedtime     sodium chloride (PF)  3 mL Intracatheter Q8H     tamsulosin  0.4 mg Oral Daily       Data   Recent Labs   Lab 08/27/22  2105 08/27/22  1708 08/27/22  1524   * 206* 257*       No results found for this or any previous visit (from the past 24 hour(s)).      Assessment & Plan   Nathan Macias is a 89 year old male with a history of afib on Eliquis who was brought to the ED with dysarthria, aphasia and ataxia and noted to have an acute ischemic stroke of the RIGHT cerebellum. He is admitted for further evaluation and management.      Acute Ischemic stroke of RIGHT cerebellum  - TTE with normal EF, indeterminate diastolic function and neg bubble.  - Expressive aphasia (persistent), not a candidate for stroke intervention.  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 No evidence of malignancy on screen.   - Exam per neuro;  dysarthria, decreased JAGDISH in RUE and RLE, ataxia with finger to nose and heel to shin with RUE and RLE. normal on the left  - dysarthria and ataxia with very slow improvement, Strength intact. Taking po.   - sitter since fall on 8/6 but has been without sitter for a number of days, no restraints  - head ct post fall stable.   - Continue PTA statin. Cont PtA dose as LDL 40   - Cont PTA BP meds, eliquis  -  Secondary stroke prevention with apixaban + ASA 81 mg daily x90 days, then just apixaban after that.   - PT/OT/SLP recommending TCU  - Long term goals < 130/80, LDL goal <70, A1c goal <7  - Follow up with general neurology in 6-8 weeks   - repeat CTH non contrast showed continued evolution of right cerebellar infarction  - making clinical improvement with improved aphasia and less delirium and agitation.  - he walked down the causey with walker and following full commands.  He is currently assist of 2.    Delirium - resolved  Probable underlyng cognitive issues.  Wife reports that the patient has been having issues with short-term memories for about a year (forgetting keys and groceries etc).  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 Needed sitter started.   -needed prn zyprex aon 8/2 and 8/3.   Impulsive. Pulled out iv, pulled at catheter.   8/4 No agitation, cooperative. Remains impulsive at times.   ucx ngtd  Cefuroxime for possible uti- stopped given ucx ngt   -add qpm meletonin  - Delirium bundle, decrease frequency of vitals and telemetry  - Pollack as below for retention.  - Formal cognitive testing as outpatient.  - had fall night on 8/17   - zyprexa discontinued and continue on seroquel at bedtime and prn.      Unwitnessed fall 8/6 and 8/17  No neuro changes. No change in status prefall  Bed alarm not on or not working  Pt is ataxic from stroke  No injury. Hb stable  Bun.cr elevated, given fluid bolus and maint fluids will SL  -Cr better with fluids  Had fall night of 8/17, seen by house ashleigh, labs stable, CTH showed nothing acute    HERNAN.   prerenal  Hb stable.   Held ace inh and hydrochlorothiazide  Cr at baseline   Discontinue hydrochlorothiazide in the future     Atrial fibrillation  - continue BB and apixaban.     DMII. A1C is 7.1.   range today range.   - hold metformin while inpatient  - sliding scale insulin with high resistance  - added prandial aspart  1 unit per 10 gram  carbs 8/6, adjust as needed.      Hypertension  Good control  - discontinued hydrochlorothiazide   - continue lisinopril 20 mg daily  - BP stable on metoprolol 25 mg daily.    Cough  Sore throat:  No infection seen on CT chest.Neg for strep AG.  Afebrile.   - Cepacol. Prn.  - Prn antitussives.  - IS.    Prostatomegaly on CT (unsure if new but was on flomax PTA).  Urinary retention causing frequent cath.  Feliz traums 8/15  Hx of tx for bph and LUTS for 2 years with flomax  UA 8/3: 37 wbc, 176 rbc  - Indewelling feliz.  - Continue flomax.  - Bladder scan given confusion.  - Urology consult. Seen 8/3, rec feliz and consider voiding trial in several days. Cont flomax. CT scan reviewed and agree with radiology interpretation . No indication for urgent urologic intervention. Urology signed off  - follow up ucx shows no growth  - consider voiding trial in 2-3 days, if discharge with feliz outpatient urology follow up  [facility] urology or at [facility] urology  - pulled at catheter. Some blood in feliz. No clots. RN to flush, follow closely.   -8/5, still with bloody urine, ucx ngtd.    - 8/10 UA abnormal, UCx negative, discontinue empiric iv ceftriaxone, keep penile tip around feliz catheter clean.  - patient noted to have coke color urine, ua + hematuria, now clearing  - penile discharge again noted, UA abnormal 8/19 and started on ceftriaxone pending urine cultures NGTD  - discontinued feliz   - needing intermittent straight cath    Pulmonary nodule: 3mm, accidental.  - Repeat CT in 12 months.  - pcp follow up      DVT Prophylaxis: apixaban.    Code Status: Full Code     Disposition:   -- sw notes reviewed, awaiting [employer] approval for out of network TCU  -- when TCU bed available.  -- updated wife Cherry on 8/26      DVT Prophylaxis: Pneumatic Compression Devices  Code Status: Full Code  Disposition: Expected discharge when TCU bed available      Lindsay Lou MD, MD  [phone](p)  [phone]( c)

## 2022-08-28 NOTE — PROGRESS NOTES
Care Management Follow Up    Length of Stay (days): 27    Expected Discharge Date: 08/29/2022     Concerns to be Addressed:       Patient plan of care discussed at interdisciplinary rounds: Yes    Anticipated Discharge Disposition: Transitional Care TBD     Anticipated Discharge Services: Transportation Services  Anticipated Discharge DME:      Patient/family educated on Medicare website which has current facility and service quality ratings:    Education Provided on the Discharge Plan:  yes  Patient/Family in Agreement with the Plan: yes. SW spoke with pt's wife regarding referrals . She stated she was agreeable to send more referrals out even though [facility] TCU's are farther away.  Referrals Placed by CM/SW: additional referral sent out.  Pt's wife sent information from [facility] that if a contacted facilityis not secured, a non contracted facility is able to petition for waiver to admit pt to their facility. This in not an authorization but an option if a non- [employer] TCU would accept pt clinically for admission and petition [facility] out of network authorization for placement. SW attempted to explain to wife that this was not an actual authorization for [facility] TCU but an option if TCU cannot be secured in network.    Private pay costs discussed:     Additional Information:  Call placed and message left with [facility] liaison regarding referral and pt's vaccination card. Return call requested.    HELEN Hall  [facility]  Care Transitions  [phone]

## 2022-08-29 ENCOUNTER — APPOINTMENT (OUTPATIENT)
Dept: PHYSICAL THERAPY | Facility: CLINIC | Age: 87
DRG: 065 | End: 2022-08-29
Payer: COMMERCIAL

## 2022-08-29 ENCOUNTER — APPOINTMENT (OUTPATIENT)
Dept: OCCUPATIONAL THERAPY | Facility: CLINIC | Age: 87
DRG: 065 | End: 2022-08-29
Payer: COMMERCIAL

## 2022-08-29 ENCOUNTER — APPOINTMENT (OUTPATIENT)
Dept: SPEECH THERAPY | Facility: CLINIC | Age: 87
DRG: 065 | End: 2022-08-29
Payer: COMMERCIAL

## 2022-08-29 LAB
CREAT SERPL-MCNC: 1.13 MG/DL (ref 0.66–1.25)
GFR SERPL CREATININE-BSD FRML MDRD: 62 ML/MIN/1.73M2
GLUCOSE BLDC GLUCOMTR-MCNC: 115 MG/DL (ref 70–99)
GLUCOSE BLDC GLUCOMTR-MCNC: 131 MG/DL (ref 70–99)
GLUCOSE BLDC GLUCOMTR-MCNC: 161 MG/DL (ref 70–99)
GLUCOSE BLDC GLUCOMTR-MCNC: 169 MG/DL (ref 70–99)
GLUCOSE BLDC GLUCOMTR-MCNC: 77 MG/DL (ref 70–99)
GLUCOSE BLDC GLUCOMTR-MCNC: 77 MG/DL (ref 70–99)

## 2022-08-29 PROCEDURE — 250N000013 HC RX MED GY IP 250 OP 250 PS 637: Performed by: HOSPITALIST

## 2022-08-29 PROCEDURE — 97116 GAIT TRAINING THERAPY: CPT | Mod: GP | Performed by: PHYSICAL THERAPIST

## 2022-08-29 PROCEDURE — 82565 ASSAY OF CREATININE: CPT | Performed by: INTERNAL MEDICINE

## 2022-08-29 PROCEDURE — 258N000003 HC RX IP 258 OP 636: Performed by: HOSPITALIST

## 2022-08-29 PROCEDURE — 97530 THERAPEUTIC ACTIVITIES: CPT | Mod: GO

## 2022-08-29 PROCEDURE — 36415 COLL VENOUS BLD VENIPUNCTURE: CPT | Performed by: INTERNAL MEDICINE

## 2022-08-29 PROCEDURE — 99232 SBSQ HOSP IP/OBS MODERATE 35: CPT | Performed by: HOSPITALIST

## 2022-08-29 PROCEDURE — 120N000001 HC R&B MED SURG/OB

## 2022-08-29 PROCEDURE — 97530 THERAPEUTIC ACTIVITIES: CPT | Mod: GP | Performed by: PHYSICAL THERAPIST

## 2022-08-29 PROCEDURE — 250N000013 HC RX MED GY IP 250 OP 250 PS 637: Performed by: STUDENT IN AN ORGANIZED HEALTH CARE EDUCATION/TRAINING PROGRAM

## 2022-08-29 PROCEDURE — 92507 TX SP LANG VOICE COMM INDIV: CPT | Mod: GN | Performed by: SPEECH-LANGUAGE PATHOLOGIST

## 2022-08-29 PROCEDURE — 97535 SELF CARE MNGMENT TRAINING: CPT | Mod: GO

## 2022-08-29 PROCEDURE — 250N000013 HC RX MED GY IP 250 OP 250 PS 637: Performed by: INTERNAL MEDICINE

## 2022-08-29 PROCEDURE — 92526 ORAL FUNCTION THERAPY: CPT | Mod: GN | Performed by: SPEECH-LANGUAGE PATHOLOGIST

## 2022-08-29 RX ADMIN — INSULIN ASPART 4 UNITS: 100 INJECTION, SOLUTION INTRAVENOUS; SUBCUTANEOUS at 18:01

## 2022-08-29 RX ADMIN — APIXABAN 5 MG: 5 TABLET, FILM COATED ORAL at 20:55

## 2022-08-29 RX ADMIN — QUETIAPINE FUMARATE 50 MG: 50 TABLET ORAL at 20:57

## 2022-08-29 RX ADMIN — ATORVASTATIN CALCIUM 10 MG: 10 TABLET, FILM COATED ORAL at 20:56

## 2022-08-29 RX ADMIN — INSULIN ASPART 5 UNITS: 100 INJECTION, SOLUTION INTRAVENOUS; SUBCUTANEOUS at 09:36

## 2022-08-29 RX ADMIN — ASPIRIN 81 MG CHEWABLE TABLET 81 MG: 81 TABLET CHEWABLE at 09:30

## 2022-08-29 RX ADMIN — TAMSULOSIN HYDROCHLORIDE 0.4 MG: 0.4 CAPSULE ORAL at 09:30

## 2022-08-29 RX ADMIN — SODIUM CHLORIDE 500 ML: 9 INJECTION, SOLUTION INTRAVENOUS at 12:06

## 2022-08-29 RX ADMIN — INSULIN ASPART 6 UNITS: 100 INJECTION, SOLUTION INTRAVENOUS; SUBCUTANEOUS at 14:22

## 2022-08-29 RX ADMIN — APIXABAN 5 MG: 5 TABLET, FILM COATED ORAL at 09:34

## 2022-08-29 ASSESSMENT — ACTIVITIES OF DAILY LIVING (ADL)
ADLS_ACUITY_SCORE: 51
ADLS_ACUITY_SCORE: 49
ADLS_ACUITY_SCORE: 53
ADLS_ACUITY_SCORE: 53
ADLS_ACUITY_SCORE: 51
ADLS_ACUITY_SCORE: 53
ADLS_ACUITY_SCORE: 53
ADLS_ACUITY_SCORE: 49
ADLS_ACUITY_SCORE: 53

## 2022-08-29 NOTE — PROGRESS NOTES
Care Management Follow Up    Length of Stay (days): 28    Expected Discharge Date: 08/30/2022     Concerns to be Addressed:       Patient plan of care discussed at interdisciplinary rounds: Yes    Anticipated Discharge Disposition: Transitional Care     Anticipated Discharge Services: Transportation Services  Anticipated Discharge DME:      Patient/family educated on Medicare website which has current facility and service quality ratings:    Education Provided on the Discharge Plan:    Patient/Family in Agreement with the Plan: yes    Referrals Placed by CM/SW:    Private pay costs discussed: Not applicable    Additional Information:  Writer spent time following up on referrals.     [facility]: Left Voicemail   [facility]: Message sent to [facility] Lia[facility]- [facility] due to behaviors   [facility]: admissions is reviewing referral      LEANDRA Avalos, LGSW   Social Work   [facility]

## 2022-08-29 NOTE — PROGRESS NOTES
[facility]    Hospitalist Progress Note    Interval History     Patient awake and alert.  Comfortably.  Continues to have aphasia.  Patient tends to have intermittent episodes of hypotension.  They are usually asymptomatic.    -Data reviewed today: I reviewed all new labs and imaging results over the last 24 hours. I personally reviewed CT head and MRI brain that show cerebellar stroke    Physical Exam   Temp: 98  F (36.7  C) Temp src: Oral BP: (!) 85/39 Pulse: 95   Resp: 16 SpO2: 95 % O2 Device: None (Room air)    Vitals:    08/01/22 0944   Weight: 84 kg (185 lb 3 oz)     Vital Signs with Ranges  Temp:  [97.8  F (36.6  C)-98  F (36.7  C)] 98  F (36.7  C)  Pulse:  [64-97] 95  Resp:  [16] 16  BP: ()/(39-52) 85/39  SpO2:  [95 %-96 %] 95 %  I/O last 3 completed shifts:  In: -   Out: 500 [Urine:500]    Physical Exam  Constitutional:       Comments: Old and frail.   HENT:      Head: Normocephalic.   Eyes:      Pupils: Pupils are equal, round, and reactive to light.   Cardiovascular:      Rate and Rhythm: Normal rate and regular rhythm.      Pulses: Normal pulses.      Heart sounds: Normal heart sounds.   Pulmonary:      Effort: Pulmonary effort is normal. No respiratory distress.      Breath sounds: Normal breath sounds.   Abdominal:      General: Abdomen is flat. Bowel sounds are normal. There is no distension.      Tenderness: There is no abdominal tenderness. There is no guarding.   Musculoskeletal:         General: Normal range of motion.      Cervical back: Normal range of motion.   Skin:     General: Skin is warm and dry.   Neurological:      General: No focal deficit present.      Comments: Expressive aphasia.  Has generalized weakness.  Ataxia as well.     Psychiatric:         Mood and Affect: Mood normal.           Medications     - MEDICATION INSTRUCTIONS -       - MEDICATION INSTRUCTIONS -       - MEDICATION INSTRUCTIONS -         apixaban ANTICOAGULANT  5 mg Oral BID      aspirin  81 mg Oral Daily     atorvastatin  10 mg Oral or Feeding Tube QPM     [Held by provider] hydrochlorothiazide  25 mg Oral Daily     insulin aspart  1-10 Units Subcutaneous TID AC     insulin aspart  1-7 Units Subcutaneous At Bedtime     insulin aspart   Subcutaneous TID w/meals     lisinopril  20 mg Oral Daily     metoprolol succinate ER  25 mg Oral Daily     QUEtiapine  50 mg Oral At Bedtime     sodium chloride (PF)  3 mL Intracatheter Q8H     tamsulosin  0.4 mg Oral Daily       Data   Recent Labs   Lab 08/29/22  0837 08/29/22  0735 08/29/22  0143 08/28/22  2108   CR 1.13  --   --   --    GLC  --  115* 131* 137*       No results found for this or any previous visit (from the past 24 hour(s)).      Assessment & Plan   Nathan Macias is a 89 year old male with a history of afib on Eliquis who was brought to the ED with dysarthria, aphasia and ataxia and noted to have an acute ischemic stroke of the RIGHT cerebellum. He is admitted for further evaluation and management.      Acute Ischemic stroke of RIGHT cerebellum  - TTE with normal EF, indeterminate diastolic function and neg bubble.  - Expressive aphasia (persistent), not a candidate for stroke intervention.  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 No evidence of malignancy on screen.   - Exam per neuro;  dysarthria, decreased JAGDISH in RUE and RLE, ataxia with finger to nose and heel to shin with RUE and RLE. normal on the left  - dysarthria and ataxia with very slow improvement, Strength intact. Taking po.   - sitter since fall on 8/6 but has been without sitter for a number of days, no restraints  - head ct post fall stable.   - Continue PTA statin. Cont PtA dose as LDL 40   - Cont PTA BP meds, eliquis  - Secondary stroke prevention with apixaban + ASA 81 mg daily x90 days, then just apixaban after that.   - PT/OT/SLP recommending TCU  - Long term goals < 130/80, LDL  goal <70, A1c goal <7  - Follow up with general neurology in 6-8 weeks   - repeat CTH non contrast showed continued evolution of right cerebellar infarction  - making clinical improvement with improved aphasia and less delirium and agitation.  - he walked down the causey with walker and following full commands.  He is currently assist of 2.    Delirium - resolved  Probable underlyng cognitive issues.  Wife reports that the patient has been having issues with short-term memories for about a year (forgetting keys and groceries etc).  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 Needed sitter started.   -needed prn zyprex aon 8/2 and 8/3.   Impulsive. Pulled out iv, pulled at catheter.   8/4 No agitation, cooperative. Remains impulsive at times.   ucx ngtd  Cefuroxime for possible uti- stopped given ucx ngt   -add qpm meletonin  - Delirium bundle, decrease frequency of vitals and telemetry  - Pollack as below for retention.  - Formal cognitive testing as outpatient.  - had fall night on 8/17   - zyprexa discontinued and continue on seroquel at bedtime and prn.      Unwitnessed fall 8/6 and 8/17  No neuro changes. No change in status prefall  Bed alarm not on or not working  Pt is ataxic from stroke  No injury. Hb stable  Bun.cr elevated, given fluid bolus and maint fluids will SL  -Cr better with fluids  Had fall night of 8/17, seen by house ashleigh, labs stable, CTH showed nothing acute    HERNAN.   prerenal  Hb stable.   Held ace inh and hydrochlorothiazide  Cr at baseline   Discontinue hydrochlorothiazide in the future     Atrial fibrillation  - continue BB and apixaban.     DMII. A1C is 7.1.   range today range.   - hold metformin while inpatient  - sliding scale insulin with high resistance  - added prandial aspart  1 unit per 10 gram carbs 8/6, adjust as needed.      Hypertension  Good control  - discontinued hydrochlorothiazide   - continue lisinopril 20 mg daily  - BP stable on metoprolol 25 mg  daily.    And has been having intermittent episodes of hypotension that are completely asymptomatic.  Will hold lisinopril and metoprolol for now and reevaluate.    Cough  Sore throat:  No infection seen on CT chest.Neg for strep AG.  Afebrile.   - Cepacol. Prn.  - Prn antitussives.  - IS.    Prostatomegaly on CT (unsure if new but was on flomax PTA).  Urinary retention causing frequent cath.  Feliz traums 8/15  Hx of tx for bph and LUTS for 2 years with flomax  UA 8/3: 37 wbc, 176 rbc  - Indewelling feliz.  - Continue flomax.  - Bladder scan given confusion.  - Urology consult. Seen 8/3, rec feliz and consider voiding trial in several days. Cont flomax. CT scan reviewed and agree with radiology interpretation . No indication for urgent urologic intervention. Urology signed off  - follow up ucx shows no growth  - consider voiding trial in 2-3 days, if discharge with feliz outpatient urology follow up  [facility] urology or at [facility] urology  - pulled at catheter. Some blood in feliz. No clots. RN to flush, follow closely.   -8/5, still with bloody urine, ucx ngtd.    - 8/10 UA abnormal, UCx negative, discontinue empiric iv ceftriaxone, keep penile tip around feliz catheter clean.  - patient noted to have coke color urine, ua + hematuria, now clearing  - penile discharge again noted, UA abnormal 8/19 and started on ceftriaxone pending urine cultures NGTD  - discontinued feliz   - needing intermittent straight cath    Pulmonary nodule: 3mm, accidental.  - Repeat CT in 12 months.  - pcp follow up      DVT Prophylaxis: apixaban.    Code Status: Full Code     Disposition:   -- sw notes reviewed, awaiting [employer] approval for out of network TCU  -- when TCU bed available.  -- updated wife Cherry on 8/26      DVT Prophylaxis: Pneumatic Compression Devices  Code Status: Full Code  Disposition: Expected discharge when TCU bed available      Lindsay Lou MD, MD  [phone](p)  [phone]( c)

## 2022-08-30 ENCOUNTER — APPOINTMENT (OUTPATIENT)
Dept: PHYSICAL THERAPY | Facility: CLINIC | Age: 87
DRG: 065 | End: 2022-08-30
Payer: COMMERCIAL

## 2022-08-30 VITALS
HEART RATE: 94 BPM | OXYGEN SATURATION: 97 % | DIASTOLIC BLOOD PRESSURE: 48 MMHG | TEMPERATURE: 98.3 F | HEIGHT: 68 IN | WEIGHT: 185.19 LBS | SYSTOLIC BLOOD PRESSURE: 86 MMHG | RESPIRATION RATE: 18 BRPM | BODY MASS INDEX: 28.07 KG/M2

## 2022-08-30 LAB
GLUCOSE BLDC GLUCOMTR-MCNC: 126 MG/DL (ref 70–99)
GLUCOSE BLDC GLUCOMTR-MCNC: 156 MG/DL (ref 70–99)
GLUCOSE BLDC GLUCOMTR-MCNC: 206 MG/DL (ref 70–99)

## 2022-08-30 PROCEDURE — 250N000013 HC RX MED GY IP 250 OP 250 PS 637: Performed by: HOSPITALIST

## 2022-08-30 PROCEDURE — 250N000011 HC RX IP 250 OP 636: Performed by: INTERNAL MEDICINE

## 2022-08-30 PROCEDURE — 99239 HOSP IP/OBS DSCHRG MGMT >30: CPT | Performed by: HOSPITALIST

## 2022-08-30 PROCEDURE — 97530 THERAPEUTIC ACTIVITIES: CPT | Mod: GP | Performed by: PHYSICAL THERAPIST

## 2022-08-30 PROCEDURE — 250N000013 HC RX MED GY IP 250 OP 250 PS 637: Performed by: STUDENT IN AN ORGANIZED HEALTH CARE EDUCATION/TRAINING PROGRAM

## 2022-08-30 PROCEDURE — 0013A HC ADMIN COVID VAC MODERNA, 3RD DOSE IMM COMP PT: CPT | Performed by: INTERNAL MEDICINE

## 2022-08-30 PROCEDURE — 91301 HC RX IP 250 OP 636: CPT | Performed by: INTERNAL MEDICINE

## 2022-08-30 RX ORDER — QUETIAPINE FUMARATE 50 MG/1
50 TABLET, FILM COATED ORAL AT BEDTIME
Start: 2022-08-30 | End: 2024-03-07

## 2022-08-30 RX ORDER — DIPHENHYDRAMINE HYDROCHLORIDE 50 MG/ML
50 INJECTION INTRAMUSCULAR; INTRAVENOUS
Status: DISCONTINUED | OUTPATIENT
Start: 2022-08-30 | End: 2022-08-30 | Stop reason: HOSPADM

## 2022-08-30 RX ORDER — DIPHENHYDRAMINE HCL 25 MG
50 CAPSULE ORAL
Status: DISCONTINUED | OUTPATIENT
Start: 2022-08-30 | End: 2022-08-30 | Stop reason: HOSPADM

## 2022-08-30 RX ORDER — AMOXICILLIN 250 MG
1 CAPSULE ORAL 2 TIMES DAILY PRN
Start: 2022-08-30 | End: 2024-03-07

## 2022-08-30 RX ORDER — QUETIAPINE FUMARATE 25 MG/1
12.5 TABLET, FILM COATED ORAL EVERY 6 HOURS PRN
Start: 2022-08-30 | End: 2022-09-15

## 2022-08-30 RX ADMIN — APIXABAN 5 MG: 5 TABLET, FILM COATED ORAL at 09:09

## 2022-08-30 RX ADMIN — TAMSULOSIN HYDROCHLORIDE 0.4 MG: 0.4 CAPSULE ORAL at 09:09

## 2022-08-30 RX ADMIN — INSULIN ASPART 4 UNITS: 100 INJECTION, SOLUTION INTRAVENOUS; SUBCUTANEOUS at 13:13

## 2022-08-30 RX ADMIN — CX-024414 0.25 ML: 0.2 INJECTION, SUSPENSION INTRAMUSCULAR at 13:52

## 2022-08-30 RX ADMIN — ASPIRIN 81 MG CHEWABLE TABLET 81 MG: 81 TABLET CHEWABLE at 09:09

## 2022-08-30 RX ADMIN — INSULIN ASPART 3 UNITS: 100 INJECTION, SOLUTION INTRAVENOUS; SUBCUTANEOUS at 09:09

## 2022-08-30 ASSESSMENT — ACTIVITIES OF DAILY LIVING (ADL)
ADLS_ACUITY_SCORE: 55
ADLS_ACUITY_SCORE: 51
ADLS_ACUITY_SCORE: 55

## 2022-08-30 NOTE — PROGRESS NOTES
Care Management Discharge Note    Discharge Date: 08/30/2022       Discharge Disposition: Transitional Care    Discharge Services: Transportation Services    Discharge DME:      Discharge Transportation: family or friend will provide    Private pay costs discussed: transportation costs    PAS Confirmation Code: [id number]  Patient/family educated on Medicare website which has current facility and service quality ratings: yes    Education Provided on the Discharge Plan:    Persons Notified of Discharge Plans: patient, spouse, care team  Patient/Family in Agreement with the Plan: yes    Handoff Referral Completed:    Additional Information:  Writer received call [facility] can accept patient today and requested for hospital to give second covid booster. Writer updated patient's spouse who is very excited that placement has been found and is in agreement with discharge plans and patient receiving booster. Patient's spouse requested for transportation to be set up by writer. Writer set up via [facility] stretcher at 1600 (d/t dementia, Ax2, risk for falls, cannot be alone in back of rig). Writer provided spouse with address and phone number. Writer relayed message from admissions that patient is admitting into a private room and was informed no private room fee.    Hospitalist is in agreement with discharge plans and placed orders. Writer faxed orders to [facility] and confirmed [address] ride. PCS faxed and on chart. PAS completed and on chart.    PAS-RR    D: Per DHS regulation, KIMBERLY completed and submitted PAS-RR to [place] [employer] Direct Connect via the [employer].  PAS-RR confirmation # is : [id number]    I: KIMBERLY spoke with patient's spouse and they are aware a PAS-RR has been submitted.  KIMBERLY reviewed with patient's spousethat they may be contacted for a follow up appointment within 10 days of hospital discharge if their SNF stay is < 30 days.  Contact information for [employer] was also  provided.    A: Patient's spouse verbalized understanding.    P: Further questions may be directed to Senior LinkAge Line at #[phone], option #4 for [facility]- staff.      LEANDRA Jarrett, [facility]    [facility]

## 2022-08-30 NOTE — DISCHARGE SUMMARY
[facility]    Discharge Summary  Hospitalist    Date of Admission:  8/1/2022  Date of Discharge:  8/30/2022    Discharge Diagnoses      Cerebrovascular accident (CVA), unspecified mechanism (H)  Aphasia  Type 1 diabetes, HbA1c goal < 8% (H)  Type 2 diabetes, HbA1C goal < 8% (H)    History of Present Illness   Nathan Macias is an 89 year old male who presented with acute right cerebellar stroke with dysarthria, aphasia and ataxia    Hospital Course   Nathan Macias was admitted on 8/1/2022.  The following problems were addressed during his hospitalization:    Nathan Macias is a 89 year old male with a history of afib on Eliquis who was brought to the ED with dysarthria, aphasia and ataxia and noted to have an acute ischemic stroke of the RIGHT cerebellum. He is admitted for further evaluation and management.      Acute Ischemic stroke of RIGHT cerebellum  - TTE with normal EF, indeterminate diastolic function and neg bubble.  - Expressive aphasia (persistent), not a candidate for stroke intervention.  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 No evidence of malignancy on screen.   - Exam per neuro;  dysarthria, decreased JAGDISH in RUE and RLE, ataxia with finger to nose and heel to shin with RUE and RLE. normal on the left  - dysarthria and ataxia with very slow improvement, Strength intact. Taking po.   - sitter since fall on 8/6 but has been without sitter for a number of days, no restraints  - head ct post fall stable.   - Continue PTA statin. Cont PtA dose as LDL 40   - Cont PTA BP meds, eliquis  - Secondary stroke prevention with apixaban + ASA 81 mg daily x90 days, then just apixaban after that.   - PT/OT/SLP recommending TCU  - Long term goals < 130/80, LDL goal <70, A1c goal <7  - Follow up with general neurology in 6-8 weeks   - repeat CTH non contrast showed continued evolution of  right cerebellar infarction  - making clinical improvement with improved aphasia and less delirium and agitation.  - he walked down the causey with walker and following full commands.  He is currently assist of 2.     Delirium - resolved  Probable underlyng cognitive issues.  Wife reports that the patient has been having issues with short-term memories for about a year (forgetting keys and groceries etc).  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 Needed sitter started.   -needed prn zyprex aon 8/2 and 8/3.   Impulsive. Pulled out iv, pulled at catheter.   8/4 No agitation, cooperative. Remains impulsive at times.   ucx ngtd  Cefuroxime for possible uti- stopped given ucx ngt   -add qpm meletonin  - Delirium bundle, decrease frequency of vitals and telemetry  - Pollack as below for retention.  - Formal cognitive testing as outpatient.  - had fall night on 8/17   - zyprexa discontinued and continue on seroquel at bedtime and prn.       Unwitnessed fall 8/6 and 8/17  No neuro changes. No change in status prefall  Bed alarm not on or not working  Pt is ataxic from stroke  No injury. Hb stable  Bun.cr elevated, given fluid bolus and maint fluids will SL  -Cr better with fluids  Had fall night of 8/17, seen by house ashleigh, labs stable, CTH showed nothing acute     HERNAN.   prerenal  Hb stable.   Held ace inh and hydrochlorothiazide  Cr at baseline   Discontinue hydrochlorothiazide and lisinopril      Atrial fibrillation  - continue BB and apixaban.     DMII. A1C is 7.1.   range today range.   - hold metformin while inpatient  - sliding scale insulin with high resistance  - added prandial aspart  1 unit per 10 gram carbs 8/6, adjust as needed.      Hypertension  Good control  - discontinued hydrochlorothiazide   - continue lisinopril 20 mg daily  - BP stable on metoprolol 25 mg daily.     And has been having intermittent episodes of hypotension that are completely asymptomatic.      Cough  Sore  throat:  No infection seen on CT chest.Neg for strep AG.  Afebrile.   - Cepacol. Prn.  - Prn antitussives.  - IS.     Prostatomegaly on CT (unsure if new but was on flomax PTA).  Urinary retention causing frequent cath.  Feliz traums 8/15  Hx of tx for bph and LUTS for 2 years with flomax  UA 8/3: 37 wbc, 176 rbc  - Indewelling feliz.  - Continue flomax.  - Bladder scan given confusion.  - Urology consult. Seen 8/3, rec feliz and consider voiding trial in several days. Cont flomax. CT scan reviewed and agree with radiology interpretation . No indication for urgent urologic intervention. Urology signed off  - follow up ucx shows no growth  - consider voiding trial in 2-3 days, if discharge with feliz outpatient urology follow up  [facility] urology or at [facility] urology  - pulled at catheter. Some blood in feliz. No clots. RN to flush, follow closely.   -8/5, still with bloody urine, ucx ngtd.    - 8/10 UA abnormal, UCx negative, discontinue empiric iv ceftriaxone, keep penile tip around feliz catheter clean.  - patient noted to have coke color urine, ua + hematuria, now clearing  - penile discharge again noted, UA abnormal 8/19 and started on ceftriaxone pending urine cultures NGTD  - discontinued feliz   - needing intermittent straight cath     Pulmonary nodule: 3mm, accidental.  - Repeat CT in 12 months.  - pcp follow up      DVT Prophylaxis: apixaban.     Code Status: Full Code     Disposition:   Discharged to TCU today        DVT Prophylaxis: Pneumatic Compression Devices  Code Status: Full Code  Disposition: Expected discharge when TCU bed available        Code Status   Full Code       Primary Care Physician   [facility]    Physical Exam   Temp: (!) 96.4  F (35.8  C) Temp src: Oral BP: 139/77 Pulse: 87   Resp: 16 SpO2: 97 % O2 Device: None (Room air)    Vitals:    08/01/22 0944   Weight: 84 kg (185 lb 3 oz)     Vital Signs with Ranges  Temp:  [96.4  F (35.8  C)-97.8  F (36.6  C)] 96.4  F  (35.8  C)  Pulse:  [73-87] 87  Resp:  [16-18] 16  BP: ()/(48-77) 139/77  SpO2:  [95 %-97 %] 97 %  I/O last 3 completed shifts:  In: 720 [P.O.:220; IV Piggyback:500]  Out: 375 [Urine:375]    Physical Exam  Constitutional:       Appearance: Normal appearance.   HENT:      Head: Normocephalic.   Eyes:      Pupils: Pupils are equal, round, and reactive to light.   Cardiovascular:      Rate and Rhythm: Normal rate and regular rhythm.      Pulses: Normal pulses.      Heart sounds: Normal heart sounds.   Pulmonary:      Effort: Pulmonary effort is normal. No respiratory distress.      Breath sounds: Normal breath sounds.   Abdominal:      General: Abdomen is flat. Bowel sounds are normal. There is no distension.      Tenderness: There is no abdominal tenderness. There is no guarding.   Musculoskeletal:         General: Normal range of motion.      Cervical back: Normal range of motion.   Skin:     General: Skin is warm and dry.   Neurological:      General: No focal deficit present.      Comments: Significant expressive aphasia with generalized weakness.   Psychiatric:         Mood and Affect: Mood normal.           Discharge Disposition   Discharged to short-term care facility  Condition at discharge: Stable    Consultations This Hospital Stay   PATIENT LEARNING CENTER IP CONSULT  NEUROLOGY IP STROKE CONSULT  SPEECH LANGUAGE PATH ADULT IP CONSULT  PHARMACY IP CONSULT  PHARMACY IP CONSULT  PHARMACY IP CONSULT  PHYSICAL THERAPY ADULT IP CONSULT  OCCUPATIONAL THERAPY ADULT IP CONSULT  REHAB ADMISSIONS LIAISON IP CONSULT  CARE MANAGEMENT / SOCIAL WORK IP CONSULT  UROLOGY IP CONSULT  PHYSICAL THERAPY ADULT IP CONSULT  OCCUPATIONAL THERAPY ADULT IP CONSULT  NUTRITION SERVICES ADULT IP CONSULT  PSYCHIATRY IP CONSULT  SPEECH LANGUAGE PATH ADULT IP CONSULT    Time Spent on this Encounter   Lindsay BALTAZAR MD, personally saw the patient today and spent greater than 30 minutes discharging this patient.    Discharge Orders       General info for SNF    Length of Stay Estimate: Short Term Care: Estimated # of Days <30  Condition at Discharge: Improving  Level of care:skilled   Rehabilitation Potential: Good  Admission H&P remains valid and up-to-date: Yes  Recent Chemotherapy: N/A  Use Nursing Home Standing Orders: Yes     Mantoux instructions    Give two-step Mantoux (PPD) Per Facility Policy Yes     Reason for your hospital stay    -Acute ischemic stroke of RIGHT cerebellum  Etiology of stroke is likely large artery atherosclerosis and R vertebral artery stenosis, artery to artery embolism. No evidence of malignancy on screen.   -underlying cognitive disorder. Needs further assessment     Glucose monitor nursing POCT    Before meals and at bedtime     Intake and output    Every shift     Activity - Up with nursing assistance     Follow Up and recommended labs and tests    1. Follow up with primary care doctor 1 week after discharge from TCU/ARU  2. Follow up with [facility] to follow up cerebellar stroke in 6 weeks. Please schedule  3. Bmp and cBC in 3 days. Bmp in 1 week     Full Code     Physical Therapy Adult Consult    Evaluate and treat as clinically indicated.    Reason:  acute CVA     Occupational Therapy Adult Consult    Evaluate and treat as clinically indicated.    Reason:  acute cva, hx of underlying cognitive disorder, cognitive and safety evaluation     Fall precautions     Diet    Follow this diet upon discharge: Orders Placed This Encounter      Combination Diet Moderate Consistent Carb (60 g CHO per Meal) Diet; Pureed Diet (level 4); Mildly Thick (level 2) (no straws)     Discharge Medications   Current Discharge Medication List      START taking these medications    Details   acetaminophen (TYLENOL) 500 MG tablet Take 2 tablets (1,000 mg) by mouth every 6 hours as needed for mild pain    Associated Diagnoses: Cerebrovascular accident (CVA), unspecified mechanism (H)      aspirin (ASA) 81 MG  chewable tablet Take 1 tablet (81 mg) by mouth daily for 87 days  Qty: 87 tablet, Refills: 0    Associated Diagnoses: Cerebrovascular accident (CVA), unspecified mechanism (H)      !! insulin aspart (NOVOLOG PEN) 100 UNIT/ML pen Inject 1-3 Units Subcutaneous At Bedtime  Qty: 15 mL    Associated Diagnoses: Type 2 diabetes, HbA1C goal < 8% (H)      !! insulin aspart (NOVOLOG PEN) 100 UNIT/ML pen Inject 1-3 Units Subcutaneous 3 times daily (before meals)  Qty: 15 mL    Associated Diagnoses: Type 2 diabetes, HbA1C goal < 8% (H)      melatonin 1 MG TABS tablet Take 1 tablet (1 mg) by mouth every evening    Associated Diagnoses: Cerebrovascular accident (CVA), unspecified mechanism (H)      !! QUEtiapine (SEROQUEL) 25 MG tablet Take 0.5 tablets (12.5 mg) by mouth every 6 hours as needed    Associated Diagnoses: Cerebrovascular accident (CVA), unspecified mechanism (H); Aphasia      !! QUEtiapine (SEROQUEL) 50 MG tablet Take 1 tablet (50 mg) by mouth At Bedtime    Associated Diagnoses: Cerebrovascular accident (CVA), unspecified mechanism (H); Aphasia      senna-docusate (SENOKOT-S/PERICOLACE) 8.6-50 MG tablet Take 1 tablet by mouth 2 times daily as needed for constipation    Associated Diagnoses: Cerebrovascular accident (CVA), unspecified mechanism (H); Aphasia       !! - Potential duplicate medications found. Please discuss with provider.      CONTINUE these medications which have NOT CHANGED    Details   apixaban ANTICOAGULANT (ELIQUIS) 5 MG tablet Take 5 mg by mouth 2 times daily      atorvastatin (LIPITOR) 20 MG tablet Take 10 mg by mouth daily      loratadine (CLARITIN) 10 MG tablet Take 10 mg by mouth daily      metFORMIN (GLUCOPHAGE XR) 500 MG 24 hr tablet Take 500 mg by mouth daily (with breakfast)      metoprolol succinate ER (TOPROL XL) 50 MG 24 hr tablet Take 25 mg by mouth daily      tamsulosin (FLOMAX) 0.4 MG capsule Take 0.4 mg by mouth daily         STOP taking these medications        lisinopril-hydrochlorothiazide (ZESTORETIC) 20-25 MG tablet Comments:   Reason for Stopping:             Allergies   No Known Allergies  Data   Recent Labs   Lab Test 08/21/22  0902 08/18/22  0218 08/17/22  0755 08/02/22  0747 08/01/22  0939   WBC 7.0 9.0 8.6   < > 5.6   HGB 12.8* 13.1* 13.3   < > 11.0*   MCV 94 94 94   < > 92    226 295   < > 147*   INR  --   --   --   --  1.37*    < > = values in this interval not displayed.      Recent Labs   Lab Test 08/30/22  1206 08/30/22  0818 08/30/22  0230 08/29/22  1219 08/29/22  0837 08/21/22  1227 08/21/22  0902 08/18/22  0841 08/18/22 0218 08/17/22  0814 08/17/22  0755   NA  --   --   --   --   --   --  141  --  140  --  138   POTASSIUM  --   --   --   --   --   --  4.7  --  4.7  --  4.7   CHLORIDE  --   --   --   --   --   --  110*  --  109  --  108   CO2  --   --   --   --   --   --  26  --  25  --  27   BUN  --   --   --   --   --   --  67*  --  66*  --  50*   CR  --   --   --   --  1.13  --  1.11  --  1.42*  --  1.08   ANIONGAP  --   --   --   --   --   --  5  --  6  --  3   MAURICIO  --   --   --   --   --   --  8.4*  --  8.7  --  8.9   * 156* 126*   < >  --    < > 171*   < > 180*   < > 213*    < > = values in this interval not displayed.         Results for orders placed or performed during the hospital encounter of 08/01/22   CT Head w/o Contrast    Narrative    CT SCAN OF THE HEAD WITHOUT CONTRAST   8/1/2022 9:23 AM     HISTORY: Code stroke., Aphasia    TECHNIQUE:  Axial images of the head and coronal reformations without  IV contrast material. Radiation dose for this scan was reduced using  automated exposure control, adjustment of the mA and/or kV according  to patient size, or iterative reconstruction technique.    COMPARISON: Head CT 3/22/2004    FINDINGS:  Mild volume loss is present. White matter hypoattenuation  likely represents mild to moderate chronic small vessel ischemic  change. Parenchyma is otherwise unremarkable. Small old  infarct  involving the left prefrontal cortex in the anterior cerebral artery  distribution, questionably present on the prior exam. No evidence of  acute ischemia, hemorrhage, mass, mass effect or hydrocephalus. The  visualized calvarium, tympanic cavities, mastoid cavities, and  extracranial soft tissues are unremarkable. Left maxillary sinus  retention cyst. Bilateral lens replacements.      Impression    IMPRESSION:    1. No CT evidence of acute ischemia or hemorrhage (ASPECTS 10).  2. Small old infarct involving the left prefrontal cortex in the  anterior cerebral artery distribution.    Findings were discussed by phone between Dr. Alejandro and Dr. Vasquez  at 9:36 AM on 8/1/2022.    MORRO ALEJANDRO MD         SYSTEM ID:  [id number]   CTA Head Neck w Contrast    Narrative    CT ANGIOGRAM OF THE HEAD AND NECK WITH CONTRAST  8/1/2022 9:32 AM     HISTORY: Code stroke, aphasia.    TECHNIQUE:  CT angiography with an injection of 75mL Isovue-370 IV  with scans through the head and neck. Images were transferred to a  separate 3-D workstation where multiplanar reformations and 3-D images  were created. Estimates of carotid stenoses are made relative to the  distal internal carotid artery diameters except as noted. Radiation  dose for this scan was reduced using automated exposure control,  adjustment of the mA and/or kV according to patient size, or iterative  reconstruction technique.    COMPARISON: None.     CT ANGIOGRAM HEAD FINDINGS:    The left anterior cerebral artery is occluded at the origin. No other  large vessel occlusions or high-grade stenoses are identified The  internal carotid arteries, right anterior cerebral artery, middle  cerebral arteries, vertebral arteries, basilar artery, and posterior  cerebral arteries are patent. Fetal origin of the right posterior  cerebral artery. Scattered atherosclerotic plaquing with multiple mild  stenoses. No aneurysm or vascular malformation is identified.  Probable  infundibulum of the left middle cerebral artery M1 segment. Dural  venous sinuses are unremarkable.     CT ANGIOGRAM NECK FINDINGS:   The bilateral common carotid, internal carotid, external carotid, and  vertebral arteries are patent. No evidence of large vessel occlusion  or high-grade stenosis. Scattered atherosclerotic plaquing. No  evidence of dissection.     Cervical spine degenerative changes with partially calcified pannus  formation and erosion of the dens. Left maxillary sinus retention  cyst.       Impression    IMPRESSION:   CTA Head:   1. 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   2. No other large vessel occlusions or high-grade stenoses are  identified.  CTA Neck:   1. No evidence of large vessel occlusion or high-grade stenosis.     MORRO CORREA MD         SYSTEM ID:  [id number]   CT Head Perfusion w Contrast    Narrative    CT BRAIN PERFUSION  8/1/2022 9:33 AM    HISTORY: Code stroke.    TECHNIQUE: Time sequential axial CT images of the head were acquired  during the administration of 125 mL Isovue-370 total IV. Color  perfusion maps of the brain were created from this time sequential  axial source data.     Radiation dose for this scan was reduced using automated exposure  control, adjustment of the mA and/or kV according to patient size, or  iterative reconstruction technique.    COMPARISON: None.      Impression    IMPRESSION: Mild transit time prolongation is present corresponding to  the left anterior cerebral artery distribution with compensated  cerebral blood volumes. Otherwise, no focal perfusion defects are  identified.    MORRO CORREA MD         SYSTEM ID:  [id number]   MR Brain w/o & w Contrast    Narrative    MRI BRAIN WITHOUT AND WITH CONTRAST  8/1/2022 10:36 AM    HISTORY:  Hyperacute rule out stroke.     TECHNIQUE:  Multiplanar, multisequence MRI of the brain without and  with 8 mL Gadavist.    COMPARISON: Same day head  CT    FINDINGS:  Small area of diffusion restriction within the right  cerebellar hemisphere. No other areas of diffusion restriction. No  convincing evidence of hemorrhage. Mild parenchymal volume loss is  present with white matter T2 hyperintensities which likely represent  chronic small vessel ischemic change. Probable old basal  ganglia/periventricular lacunar infarcts. Small old infarct involving  the left prefrontal cortex.    Marrow signal is within normal limits. Left maxillary sinus retention  cyst. Trace right mastoid cavity opacification. Bilateral lens  replacements.      Impression    IMPRESSION:  1. Acute right cerebellar infarct.  2. 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  3. Chronic changes as detailed.    MORRO CORREA MD         SYSTEM ID:  [id number]   CT Chest/Abdomen/Pelvis w Contrast    Narrative    CT CHEST/ABDOMEN/PELVIS WITH CONTRAST 8/2/2022 2:52 PM    CLINICAL HISTORY: Concern for malignancy.    TECHNIQUE: CT scan of the chest, abdomen, and pelvis was performed  following injection of IV contrast. Multiplanar reformats were  obtained. Dose reduction techniques were used.     CONTRAST: 93 mL Isovue 370    COMPARISON: None.    FINDINGS:   LUNGS AND PLEURA: Mild scattered atelectasis and/or fibrosis. Mild  bronchial wall thickening and bronchiectasis. No consolidation or  effusion. Tiny dense nodule in the right anterolaterally image 131  series 3 likely a tiny benign granuloma. Ultimately this is  incompletely characterized.    MEDIASTINUM/AXILLAE: Tiny hiatal hernia. No adenopathy or aneurysm.    CORONARY ARTERY CALCIFICATIONS: Moderate.    HEPATOBILIARY: No significant mass or bile duct dilatation. No  calcified gallstones.     PANCREAS: No significant mass, duct dilatation, or inflammatory  change.    SPLEEN: Normal size.    ADRENAL GLANDS: No significant nodules.    KIDNEYS/BLADDER: No significant mass, stones, or hydronephrosis.    BOWEL: No obstruction or inflammatory change.    PELVIC ORGANS:  No pelvic masses. Prostatomegaly measuring 6.1 x 5.7 x  5.4 cm.    ADDITIONAL FINDINGS: A few mildly enlarged [place] hepatis lymph nodes  are noted, one measuring 1.5 cm in short axis. These are nonspecific.    MUSCULOSKELETAL: No frankly destructive bony lesions.      Impression    IMPRESSION:  1.  Mild nonspecific adenopathy in the [place]ia hepatis. Mildly enlarged  lymph node here are somewhat common.  2.  Prostatomegaly measuring 6.1 x 5.7 x 5.4 cm.  3.  Tiny hiatal hernia.  4.  3 mm nodule in the right lung.    Recommendations for one or multiple incidental lung nodules < 6mm :    Low risk patients: No routine follow-up.    High risk patients: Optional follow-up CT at 12 months; if  unchanged, no further follow-up.    *Low Risk: Minimal or absent history of smoking or other known risk  factors.  *Nonsolid (ground glass) or partly solid nodules may require longer  follow-up to exclude indolent adenocarcinoma.  *Recommendations based on Guidelines for the Management of Incidental  Pulmonary Nodules Detected at CT: From the Fleischner Society 2017,  Radiology 2017.  1.    ELAINE ORTEGA MD         SYSTEM ID:  [id number]   XR Chest 2 Views    Narrative    XR CHEST 2 VIEWS 8/3/2022 1:02 PM    HISTORY: cough    COMPARISON: 8/2/2022      Impression    IMPRESSION: Mild linear atelectasis in the right lung base is stable.  No new infiltrate. No pleural effusion or pneumothorax. Normal heart  size.    LIA PARK MD         SYSTEM ID:  [id number]   XR Video Swallow with SLP or OT    Narrative    VIDEO SWALLOWING EVALUATION   8/5/2022 11:29 AM     HISTORY: Right CVA.    COMPARISON: None.    FLUOROSCOPY TIME: 1.1 minutes.  SPOT IMAGES OR CINE RUNS: 8    FINDINGS:    Thin: Aspiration.    Mildly thick: Penetration.    Moderately thick: No penetration or aspiration.    Pudding: No penetration or aspiration.    Solid/semisolid: No penetration or aspiration.    Refer to speech pathology report for additional details.     MORRO YAO  MD MADELINE         SYSTEM ID:  [id number]   CT Head w/o Contrast    Narrative    EXAM: CT HEAD W/O CONTRAST  LOCATION: [facility]  DATE/TIME: 8/6/2022 4:19 PM    INDICATION: Right cerebellar infarct.  COMPARISON: Head MRI 08/01/2022  TECHNIQUE: Routine CT Head without IV contrast. Multiplanar reformats. Dose reduction techniques were used.    FINDINGS:  INTRACRANIAL CONTENTS: No intracranial hemorrhage, extraaxial collection, or mass effect.  Moderately large right cerebellar infarct demonstrating expected evolution. It measures approximately 2.5 x 13.7 mm in transverse by AP dimensions. No significant   mass effect. Encephalomalacia in the left entorhinal region again visualized. Mild presumed chronic small vessel ischemic change. Mild generalized volume loss.     VISUALIZED ORBITS/SINUSES/MASTOIDS: No intraorbital abnormality. Left maxillary retention cyst. No middle ear or mastoid effusion.    BONES/SOFT TISSUES: No acute abnormality.      Impression    IMPRESSION:  1.  No new abnormality.  2.  Expected evolution of the moderately large right cerebellar infarct. No mass effect.  3.  Left entorhinal encephalomalacia again noted.   CT Head w/o Contrast    Narrative    EXAM: CT HEAD W/O CONTRAST  LOCATION: [facility]  DATE/TIME: 8/13/2022 4:31 PM    INDICATION: stroke, ongoing deilirum, on anticoagulation  COMPARISON: 08/06/2022.  TECHNIQUE: Routine CT Head without IV contrast. Multiplanar reformats. Dose reduction techniques were used.    FINDINGS:  INTRACRANIAL CONTENTS: No intracranial hemorrhage, extraaxial collection, or mass effect.  Further evolution of the subacute right cerebellar infarct. No new area suggestive of acute ischemia by CT. There is diffuse scattered low-attenuation within the   periventricular and subcortical white matter consistent with diffuse small vessel ischemic disease. Ventricular system, basal cisterns and the cortical sulci are  consistent with diffuse volume loss.     VISUALIZED ORBITS/SINUSES/MASTOIDS: No intraorbital abnormality. Large mucous retention cyst left maxillary sinus. No middle ear or mastoid effusion.    BONES/SOFT TISSUES: No acute abnormality.      Impression    IMPRESSION:  1.  Continuing evolution right cerebellar subacute infarct.  2.  No CT evidence of hemorrhage, midline shift or focal area suggestive of acute infarct.  3.  Diffuse age related changes.   CT Head w/o Contrast    Narrative    EXAM: CT HEAD W/O CONTRAST  LOCATION: [facility]  DATE/TIME: 2022 12:06 AM    INDICATION: Unwitnessed fall with head strike, patient is anticoagulated with DOAC.  COMPARISON: Head CT 2022 and brain MRA 2022  TECHNIQUE: Routine CT Head without IV contrast. Multiplanar reformats. Dose reduction techniques were used.    FINDINGS:  INTRACRANIAL CONTENTS: No intracranial hemorrhage, extraaxial collection, or mass effect.  No CT evidence of acute infarct. Evolving subacute right cerebellar infarct again noted. Chronic left anterior-inferior frontal lobe infarct again noted. Mild to   moderate volume loss and mild burden presumed chronic small vessel ischemic changes are stable.    VISUALIZED ORBITS/SINUSES/MASTOIDS: No intraorbital abnormality. No paranasal sinus mucosal disease. No middle ear or mastoid effusion.    BONES/SOFT TISSUES: No acute abnormality.      Impression    IMPRESSION:  1.  No acute hemorrhage or mass effect.    2.  Evolving subacute right cerebellar infarct.    3.  Age-related and chronic ischemic changes are otherwise stable.   Echocardiogram Complete - For age > 60 yrs     Value    LVEF  55%    Narrative    [id number]  [id number]  [id number]  [id number]^JAMES^[facility]  Echocardiography Laboratory  [address] [place] [address]     Name: BREONNA FOSTER  MRN: [id number]  : 1933  Study Date: 2022 10:13 AM  Age: 89  yrs  Gender: Male  Patient Location: [facility]  Reason For Study: Cerebrovascular Incident  Ordering Physician: JAMES BOO  Referring Physician: JAMES BOO  Performed By: Venkat Avina RDCS     BSA: 2.0 m2  Height: 68 in  Weight: 185 lb  HR: 95  BP: 134/49 mmHg  ______________________________________________________________________________  Procedure  Complete Portable Echo Adult. Optison (NDC #[id number]) given intravenously.  ______________________________________________________________________________  Interpretation Summary     1. The left ventricle is normal in structure, function and size. The visual  ejection fraction is estimated at 55%.  2. The right ventricle is normal in structure, function and size.  3. No valve disease.     No previous echo for comparison.  ______________________________________________________________________________  Left Ventricle  The left ventricle is normal in structure, function and size. There is  borderline concentric left ventricular hypertrophy. The visual ejection  fraction is estimated at 55%. Left ventricular diastolic function is  indeterminate. Normal left ventricular wall motion.     Right Ventricle  The right ventricle is normal in structure, function and size.     Atria  Normal left atrial size. Right atrial size is normal. There is no atrial shunt  seen.     Mitral Valve  There is no mitral regurgitation noted.     Tricuspid Valve  There is mild (1+) tricuspid regurgitation. The right ventricular systolic  pressure is approximated at 26.2 mmHg plus the right atrial pressure.     Aortic Valve  The aortic valve is normal in structure and function.     Pulmonic Valve  The pulmonic valve is normal in structure and function.     Vessels  Normal ascending, transverse (arch), and descending aorta. The inferior vena  cava was normal in size with preserved respiratory variability.     Pericardium  There is no pericardial effusion.     Rhythm  Rhythm undetermined,  wide QRS.  ______________________________________________________________________________  MMode/2D Measurements & Calculations  IVSd: 1.2 cm     LVIDd: 4.9 cm  LVIDs: 4.2 cm  LVPWd: 1.4 cm  FS: 13.6 %  LV mass(C)d: 258.1 grams  LV mass(C)dI: 130.5 grams/m2  LVOT diam: 2.3 cm  LVOT area: 4.1 cm2  LA Volume (BP): 59.2 ml  LA Volume Index (BP): 29.9 ml/m2  RWT: 0.58     Doppler Measurements & Calculations  PA acc time: 0.14 sec     TR max carrol: 255.9 cm/sec  TR max P.2 mmHg     ______________________________________________________________________________  Report approved by: Edwin Jesus 2022 11:36 AM

## 2022-08-30 NOTE — PROGRESS NOTES
"Care Management Follow Up    Length of Stay (days): 29    Expected Discharge Date: 08/31/2022     Concerns to be Addressed:       Patient plan of care discussed at interdisciplinary rounds: Yes    Anticipated Discharge Disposition: Transitional Care     Anticipated Discharge Services: Transportation Services  Anticipated Discharge DME:      Patient/family educated on Medicare website which has current facility and service quality ratings:    Education Provided on the Discharge Plan:    Patient/Family in Agreement with the Plan: yes    Referrals Placed by CM/SW:    Private pay costs discussed: Not applicable    Additional Information:  Writer left voicemail with outstanding TCU referral - [facility] noting patient is medically ready for discharge and requesting return call.    Addendum 10:30- Patient's spouse approached writer expressing her frustrations with the discharge process. Writer educated a call is out to [facility] and again reiterated the barriers for placement being patient's humana insurance is only accepted at a limited number of facilities and bed availability is not as accessible. Patient's spouse states she feels \"the hospital is just keeping him here to get money from him and that she just wants to take him home at this point.\" Writer empathized it is a frustrating process and understands her frustrations. Spouse requested to speak with hospitalist - writer marge hospitalist.     Writer sent out additional humana referrals and resent to [facility] (previously declined d/t bed availability) as they have a memory care TCU that would be appropriate for patient.    Walter Lozoya reports they are assessing patient now and will reach back out to writer.    Will continue to follow.    LEANDRA Jarrett, LGSW    [facility]        "

## 2022-09-01 ENCOUNTER — TRANSITIONAL CARE UNIT VISIT (OUTPATIENT)
Dept: GERIATRICS | Facility: CLINIC | Age: 87
End: 2022-09-01
Payer: COMMERCIAL

## 2022-09-01 ENCOUNTER — TELEPHONE (OUTPATIENT)
Dept: GERIATRICS | Facility: CLINIC | Age: 87
End: 2022-09-01

## 2022-09-01 VITALS
HEIGHT: 68 IN | DIASTOLIC BLOOD PRESSURE: 71 MMHG | RESPIRATION RATE: 18 BRPM | TEMPERATURE: 97.9 F | HEART RATE: 79 BPM | SYSTOLIC BLOOD PRESSURE: 136 MMHG | BODY MASS INDEX: 23.92 KG/M2 | WEIGHT: 157.8 LBS | OXYGEN SATURATION: 97 %

## 2022-09-01 VITALS
TEMPERATURE: 98.2 F | HEIGHT: 68 IN | SYSTOLIC BLOOD PRESSURE: 127 MMHG | WEIGHT: 157.8 LBS | BODY MASS INDEX: 23.92 KG/M2 | DIASTOLIC BLOOD PRESSURE: 70 MMHG | RESPIRATION RATE: 18 BRPM | OXYGEN SATURATION: 99 % | HEART RATE: 90 BPM

## 2022-09-01 DIAGNOSIS — I63.9 CEREBROVASCULAR ACCIDENT (CVA), UNSPECIFIED MECHANISM (H): Primary | ICD-10-CM

## 2022-09-01 DIAGNOSIS — R91.8 PULMONARY NODULES: ICD-10-CM

## 2022-09-01 DIAGNOSIS — I48.91 ATRIAL FIBRILLATION, UNSPECIFIED TYPE (H): ICD-10-CM

## 2022-09-01 DIAGNOSIS — E11.69 TYPE 2 DIABETES MELLITUS WITH OTHER SPECIFIED COMPLICATION, WITH LONG-TERM CURRENT USE OF INSULIN (H): ICD-10-CM

## 2022-09-01 DIAGNOSIS — I10 ESSENTIAL HYPERTENSION: ICD-10-CM

## 2022-09-01 DIAGNOSIS — N40.1 BENIGN PROSTATIC HYPERPLASIA WITH URINARY RETENTION: ICD-10-CM

## 2022-09-01 DIAGNOSIS — K59.01 SLOW TRANSIT CONSTIPATION: ICD-10-CM

## 2022-09-01 DIAGNOSIS — R53.81 PHYSICAL DECONDITIONING: ICD-10-CM

## 2022-09-01 DIAGNOSIS — R41.0 DELIRIUM: ICD-10-CM

## 2022-09-01 DIAGNOSIS — R41.89 COGNITIVE IMPAIRMENT: ICD-10-CM

## 2022-09-01 DIAGNOSIS — N17.9 ACUTE KIDNEY INJURY (H): ICD-10-CM

## 2022-09-01 DIAGNOSIS — R33.8 BENIGN PROSTATIC HYPERPLASIA WITH URINARY RETENTION: ICD-10-CM

## 2022-09-01 DIAGNOSIS — E78.5 HYPERLIPIDEMIA LDL GOAL <130: ICD-10-CM

## 2022-09-01 DIAGNOSIS — M62.81 GENERALIZED MUSCLE WEAKNESS: ICD-10-CM
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
## 2022-09-01 DIAGNOSIS — Z79.4 TYPE 2 DIABETES MELLITUS WITH OTHER SPECIFIED COMPLICATION, WITH LONG-TERM CURRENT USE OF INSULIN (H): ICD-10-CM

## 2022-09-01 PROCEDURE — 99310 SBSQ NF CARE HIGH MDM 45: CPT

## 2022-09-01 NOTE — PROGRESS NOTES
[facility] GERIATRICS    PRIMARY CARE PROVIDER AND CLINIC:  [facility], [address] / [facility] [address]  Chief Complaint   Patient presents with     [facility] Medical Record Number:  [id number]  Place of Service where encounter took place:  Critical access hospital (TCU) [[id number]]    HPI:  Nathan Macias  is a 89 year old  (1/12/1933), admitted to the above facility from  [facility]. Hospital stay 8/1/22 through 8/30/22.    Past medical history includes diabetes mellitus type II, chronic atrial fibrillation on AC, hypertension presented to the hospitalized for the evaluation of ataxia, dysarthria, and aphasia. Was found to have an acute ischemic CVA in the right cerebellum.  TTE was normal with a negative bubble study.  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 He was maintained on apixaban and statin. Aspirin was added for a planned 90-day course only.  Residual neurologic deficits of dysarthria, expressive aphasia, and ataxia.  Hospital course complicated by delirium felt secondary to CVA and probable underlying cognitive impairment, and urinary retention, initially requiring Pollack catheter placement.  Mood maintained on Seroquel and melatonin at bedtime. Also, hospital course significant for acute kidney injury stable now with discontinuation of hydrochlorothiazide and lisinopril. Discharged to [facility]CU on 8/30 for ongoing care.     He is being seen today for initial TCU visit.  History limited 2/2 expressive aphasia.  No acute distress.  Denies pain or discomfort.  Hemodynamically remained stable.  No complaints of headache, dizziness, chest pain or shortness of breath.  Appetite stable per report.  Denies abdominal pain or constipation.  Rehab screening underway.  Staff without acute concerns at this  time.    CODE STATUS/ADVANCE DIRECTIVES DISCUSSION:  Full Code  CPR/Full code   ALLERGIES: No Known Allergies   PAST MEDICAL HISTORY:   Past Medical History:   Diagnosis Date     A-fib (H)      BPH (benign prostatic hyperplasia)      CAD (coronary artery disease)      DMII (diabetes mellitus, type 2) (H)      TIA (transient ischemic attack)       PAST SURGICAL HISTORY:   has a past surgical history that includes back surgery and Tongue surgery.  FAMILY HISTORY: family history is not on file.  SOCIAL HISTORY:   reports that he quit smoking about 28 years ago. His smoking use included cigars and pipe. He quit after 25.00 years of use. He does not have any smokeless tobacco history on file. He reports that he does not drink alcohol.  Patient's living condition: lives with spouse    Post Discharge Medication Reconciliation Status:  Post Medication Reconciliation Status: Discharge medications reconciled and changed, see notes/orders      Current Outpatient Medications   Medication Sig     acetaminophen (TYLENOL) 500 MG tablet Take 2 tablets (1,000 mg) by mouth every 6 hours as needed for mild pain     apixaban ANTICOAGULANT (ELIQUIS) 5 MG tablet Take 5 mg by mouth 2 times daily     aspirin (ASA) 81 MG chewable tablet Take 1 tablet (81 mg) by mouth daily for 87 days     atorvastatin (LIPITOR) 20 MG tablet Take 10 mg by mouth daily     loratadine (CLARITIN) 10 MG tablet Take 10 mg by mouth daily     melatonin 1 MG TABS tablet Take 1 tablet (1 mg) by mouth every evening     metFORMIN (GLUCOPHAGE XR) 500 MG 24 hr tablet Take 500 mg by mouth daily (with breakfast)     metoprolol succinate ER (TOPROL XL) 50 MG 24 hr tablet Take 25 mg by mouth daily     QUEtiapine (SEROQUEL) 25 MG tablet Take 0.5 tablets (12.5 mg) by mouth every 6 hours as needed     QUEtiapine (SEROQUEL) 50 MG tablet Take 1 tablet (50 mg) by mouth At Bedtime     senna-docusate (SENOKOT-S/PERICOLACE) 8.6-50 MG tablet Take 1 tablet by mouth 2 times daily as  "needed for constipation     tamsulosin (FLOMAX) 0.4 MG capsule Take 0.4 mg by mouth daily     No current facility-administered medications for this visit.       ROS:  Limited secondary to aphasia impairment but today pt reports no pain.     Vitals:  /71   Pulse 79   Temp 97.9  F (36.6  C)   Resp 18   Ht 1.727 m (5' 8\")   Wt 71.6 kg (157 lb 12.8 oz)   SpO2 97%   BMI 23.99 kg/m       Exam:  GENERAL APPEARANCE: Well groomed, well-nourished, appropriately dressed   HEENT-EARS: No discharge/drainage, [place]  HEENT-NECK: No lymphadenopathy  HEENT-EYES: PERRLA positive, no drainage/discharge  HEENT-NOSE/MOUTH/THROAT: No nasal drainage or erythema, mucous membranes moist, no throat erythema   RESPIRATORY: Lungs clear to auscultation, even and unlabored, symmetrical chest wall expansion  CARDIOVASCULAR: RRR, no peripheral edema, S1/S2 normal   GASTROINTESTINAL: Soft, nontender, nondistended, bowel sounds present x4 quadrants  GENITOURINARY: Voiding freely   MUSCULOSKELETAL: No joint deformities, able to move all extremities   INTEGUMENTARY: Visualized areas intact  NEUROLOGICAL: Alert and awake, positive for expressive aphasia   PSYCHOLOGICAL: Cooperative, calm     Lab/Diagnostic data:  Last Comprehensive Metabolic Panel:  Sodium   Date Value Ref Range Status   08/21/2022 141 133 - 144 mmol/L Final     Potassium   Date Value Ref Range Status   08/21/2022 4.7 3.4 - 5.3 mmol/L Final     Chloride   Date Value Ref Range Status   08/21/2022 110 (H) 94 - 109 mmol/L Final     Carbon Dioxide (CO2)   Date Value Ref Range Status   08/21/2022 26 20 - 32 mmol/L Final     Anion Gap   Date Value Ref Range Status   08/21/2022 5 3 - 14 mmol/L Final     Glucose   Date Value Ref Range Status   08/21/2022 171 (H) 70 - 99 mg/dL Final     GLUCOSE BY METER POCT   Date Value Ref Range Status   08/30/2022 206 (H) 70 - 99 mg/dL Final     Urea Nitrogen   Date Value Ref Range Status   08/21/2022 67 (H) 7 - 30 mg/dL Final     Creatinine "   Date Value Ref Range Status   08/29/2022 1.13 0.66 - 1.25 mg/dL Final     GFR Estimate   Date Value Ref Range Status   08/29/2022 62 >60 mL/min/1.73m2 Final     Comment:     Effective December 21, 2021 eGFRcr in adults is calculated using the 2021 CKD-EPI creatinine equation which includes age and gender (Sabrina et al., NE, DOI: 10.1056/XXPAxe6647329)     Calcium   Date Value Ref Range Status   08/21/2022 8.4 (L) 8.5 - 10.1 mg/dL Final     CBC RESULTS: Recent Labs   Lab Test 08/21/22  0902   WBC 7.0   RBC 4.16*   HGB 12.8*   HCT 38.9*   MCV 94   MCH 30.8   MCHC 32.9   RDW 13.4        Lab Results   Component Value Date    A1C 7.1 08/01/2022    A1C 7.3 08/01/2022     Recent Labs   Lab Test 08/02/22  0747 08/01/22  1430   CHOL 94 100   HDL 36* 38*   LDL 40 40   TRIG 91 110       ASSESSMENT/PLAN:  Acute ischemic stroke of right cerebellum  Delirium likely due to acute CVA and underlying memory impairment  Probable underlying cognitive impairment  Comment: Residual aphasia, dysarthria, and ataxia, underlying history of memory impairment  Plan: Continue apixaban, statin, Seroquel ,and aspirin per order, OT/PT, neurology f/u, monitor for safety, monitor swallowing and nutritional status    Fall  Generalized weakness  Physical deconditioning  Comment: Ataxia 2/2 stroke, had a fall in the hospital 8/6 and 8/17 per chart review   Plan: OT/PT, fall precautions, monitor for safety    HERNAN  Comment: Stable, hydrochlorothiazide and lisinopril discontinued in the hospital  Plan: Monitor BMP, monitor for signs of worsening kidney function    A. Fib  Hypertension  Comment: Chronic, BP and pulse stable   Plan: Continue apixaban and metoprolol, monitor BP and pulse, labs periodically    Type 2 diabetes mellitus   Hyperlipidemia  Comment: Chronic, blood glucose stable  Plan: Continue metformin, statin, and apixaban, discontinue sliding scale insulin, labs periodically, monitor blood glucose    BPH  Acute urinary  retention  Comment: Pollack discontinued before hospital discharge, voiding fine now  Plan: Continue Flomax, PVR as needed, urology follow-up if indicated, monitor for signs of retention    Pulmonary nodule, 3mm  Comment: Incidental finding, recommended repeat CT in 12 months  Plan: PCP follow-up, monitor for changes in respiratory status    Slow transit constipation  Comment: Stable  Plan: Continue current care, monitor for changes in bowel habits    Orders:  Discontinue sliding scale insulin          Electronically signed by:  Boo Senior,ERIC,APRN,LENCHOP-BC.             The above note was completed in part using Dragon voice recognition software. Although reviewed after completion, some word and grammatical errors may occur. Please contact the author of this note with any questions.

## 2022-09-01 NOTE — LETTER
9/1/2022        RE: Nathan Macias  [address]  [place] [address]        [facility] GERIATRICS    PRIMARY CARE PROVIDER AND CLINIC:  [facility], [address] / [facility] [address]  Chief Complaint   Patient presents with     [facility] Medical Record Number:  [id number]  Place of Service where encounter took place:  [facility] (TCU) [[id number]]    HPI:  Nathan Macias  is a 89 year old  (1/12/1933), admitted to the above facility from  [facility]. Hospital stay 8/1/22 through 8/30/22.    Past medical history includes diabetes mellitus type II, chronic atrial fibrillation on AC, hypertension presented to the hospitalized for the evaluation of ataxia, dysarthria, and aphasia. Was found to have an acute ischemic CVA in the right cerebellum.  TTE was normal with a negative bubble study.  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 He was maintained on apixaban and statin. Aspirin was added for a planned 90-day course only.  Residual neurologic deficits of dysarthria, expressive aphasia, and ataxia.  Hospital course complicated by delirium felt secondary to CVA and probable underlying cognitive impairment, and urinary retention, initially requiring Pollack catheter placement.  Mood maintained on Seroquel and melatonin at bedtime. Also, hospital course significant for acute kidney injury stable now with discontinuation of hydrochlorothiazide and lisinopril. Discharged to [facility]CU on 8/30 for ongoing care.     He is being seen today for initial TCU visit.  History limited 2/2 expressive aphasia.  No acute distress.  Denies pain or discomfort.  Hemodynamically remained stable.  No complaints of headache, dizziness, chest pain or shortness of breath.  Appetite stable per report.  Denies  abdominal pain or constipation.  Rehab screening underway.  Staff without acute concerns at this time.    CODE STATUS/ADVANCE DIRECTIVES DISCUSSION:  Full Code  CPR/Full code   ALLERGIES: No Known Allergies   PAST MEDICAL HISTORY:   Past Medical History:   Diagnosis Date     A-fib (H)      BPH (benign prostatic hyperplasia)      CAD (coronary artery disease)      DMII (diabetes mellitus, type 2) (H)      TIA (transient ischemic attack)       PAST SURGICAL HISTORY:   has a past surgical history that includes back surgery and Tongue surgery.  FAMILY HISTORY: family history is not on file.  SOCIAL HISTORY:   reports that he quit smoking about 28 years ago. His smoking use included cigars and pipe. He quit after 25.00 years of use. He does not have any smokeless tobacco history on file. He reports that he does not drink alcohol.  Patient's living condition: lives with spouse    Post Discharge Medication Reconciliation Status:  Post Medication Reconciliation Status: Discharge medications reconciled and changed, see notes/orders      Current Outpatient Medications   Medication Sig     acetaminophen (TYLENOL) 500 MG tablet Take 2 tablets (1,000 mg) by mouth every 6 hours as needed for mild pain     apixaban ANTICOAGULANT (ELIQUIS) 5 MG tablet Take 5 mg by mouth 2 times daily     aspirin (ASA) 81 MG chewable tablet Take 1 tablet (81 mg) by mouth daily for 87 days     atorvastatin (LIPITOR) 20 MG tablet Take 10 mg by mouth daily     loratadine (CLARITIN) 10 MG tablet Take 10 mg by mouth daily     melatonin 1 MG TABS tablet Take 1 tablet (1 mg) by mouth every evening     metFORMIN (GLUCOPHAGE XR) 500 MG 24 hr tablet Take 500 mg by mouth daily (with breakfast)     metoprolol succinate ER (TOPROL XL) 50 MG 24 hr tablet Take 25 mg by mouth daily     QUEtiapine (SEROQUEL) 25 MG tablet Take 0.5 tablets (12.5 mg) by mouth every 6 hours as needed     QUEtiapine (SEROQUEL) 50 MG tablet Take 1 tablet (50 mg) by mouth At Bedtime      "senna-docusate (SENOKOT-S/PERICOLACE) 8.6-50 MG tablet Take 1 tablet by mouth 2 times daily as needed for constipation     tamsulosin (FLOMAX) 0.4 MG capsule Take 0.4 mg by mouth daily     No current facility-administered medications for this visit.       ROS:  Limited secondary to aphasia impairment but today pt reports no pain.     Vitals:  /71   Pulse 79   Temp 97.9  F (36.6  C)   Resp 18   Ht 1.727 m (5' 8\")   Wt 71.6 kg (157 lb 12.8 oz)   SpO2 97%   BMI 23.99 kg/m       Exam:  GENERAL APPEARANCE: Well groomed, well-nourished, appropriately dressed   HEENT-EARS: No discharge/drainage, [place]  HEENT-NECK: No lymphadenopathy  HEENT-EYES: PERRLA positive, no drainage/discharge  HEENT-NOSE/MOUTH/THROAT: No nasal drainage or erythema, mucous membranes moist, no throat erythema   RESPIRATORY: Lungs clear to auscultation, even and unlabored, symmetrical chest wall expansion  CARDIOVASCULAR: RRR, no peripheral edema, S1/S2 normal   GASTROINTESTINAL: Soft, nontender, nondistended, bowel sounds present x4 quadrants  GENITOURINARY: Voiding freely   MUSCULOSKELETAL: No joint deformities, able to move all extremities   INTEGUMENTARY: Visualized areas intact  NEUROLOGICAL: Alert and awake, positive for expressive aphasia   PSYCHOLOGICAL: Cooperative, calm     Lab/Diagnostic data:  Last Comprehensive Metabolic Panel:  Sodium   Date Value Ref Range Status   08/21/2022 141 133 - 144 mmol/L Final     Potassium   Date Value Ref Range Status   08/21/2022 4.7 3.4 - 5.3 mmol/L Final     Chloride   Date Value Ref Range Status   08/21/2022 110 (H) 94 - 109 mmol/L Final     Carbon Dioxide (CO2)   Date Value Ref Range Status   08/21/2022 26 20 - 32 mmol/L Final     Anion Gap   Date Value Ref Range Status   08/21/2022 5 3 - 14 mmol/L Final     Glucose   Date Value Ref Range Status   08/21/2022 171 (H) 70 - 99 mg/dL Final     GLUCOSE BY METER POCT   Date Value Ref Range Status   08/30/2022 206 (H) 70 - 99 mg/dL Final     Urea " Nitrogen   Date Value Ref Range Status   08/21/2022 67 (H) 7 - 30 mg/dL Final     Creatinine   Date Value Ref Range Status   08/29/2022 1.13 0.66 - 1.25 mg/dL Final     GFR Estimate   Date Value Ref Range Status   08/29/2022 62 >60 mL/min/1.73m2 Final     Comment:     Effective December 21, 2021 eGFRcr in adults is calculated using the 2021 CKD-EPI creatinine equation which includes age and gender (Sabrina et al., NE, DOI: 10.[facility]6/ECTQoh4353991)     Calcium   Date Value Ref Range Status   08/21/2022 8.4 (L) 8.5 - 10.1 mg/dL Final     CBC RESULTS: Recent Labs   Lab Test 08/21/22  0902   WBC 7.0   RBC 4.16*   HGB 12.8*   HCT 38.9*   MCV 94   MCH 30.8   MCHC 32.9   RDW 13.4        Lab Results   Component Value Date    A1C 7.1 08/01/2022    A1C 7.3 08/01/2022     Recent Labs   Lab Test 08/02/22  0747 08/01/22  1430   CHOL 94 100   HDL 36* 38*   LDL 40 40   TRIG 91 110       ASSESSMENT/PLAN:  Acute ischemic stroke of right cerebellum  Delirium likely due to acute CVA and underlying memory impairment  Probable underlying cognitive impairment  Comment: Residual aphasia, dysarthria, and ataxia, underlying history of memory impairment  Plan: Continue apixaban, statin, Seroquel ,and aspirin per order, OT/PT, neurology f/u, monitor for safety, monitor swallowing and nutritional status    Fall  Generalized weakness  Physical deconditioning  Comment: Ataxia 2/2 stroke, had a fall in the hospital 8/6 and 8/17 per chart review   Plan: OT/PT, fall precautions, monitor for safety    HERNAN  Comment: Stable, hydrochlorothiazide and lisinopril discontinued in the hospital  Plan: Monitor BMP, monitor for signs of worsening kidney function    A. Fib  Hypertension  Comment: Chronic, BP and pulse stable   Plan: Continue apixaban and metoprolol, monitor BP and pulse, labs periodically    Type 2 diabetes mellitus   Hyperlipidemia  Comment: Chronic, blood glucose stable  Plan: Continue metformin, statin, and apixaban, discontinue sliding  scale insulin, labs periodically, monitor blood glucose    BPH  Acute urinary retention  Comment: Pollcak discontinued before hospital discharge, voiding fine now  Plan: Continue Flomax, PVR as needed, urology follow-up if indicated, monitor for signs of retention    Pulmonary nodule, 3mm  Comment: Incidental finding, recommended repeat CT in 12 months  Plan: PCP follow-up, monitor for changes in respiratory status    Slow transit constipation  Comment: Stable  Plan: Continue current care, monitor for changes in bowel habits    Orders:  Discontinue sliding scale insulin          Electronically signed by:  Boo Senior DNP,MARY,LENCHOP-BC.             The above note was completed in part using Dragon voice recognition software. Although reviewed after completion, some word and grammatical errors may occur. Please contact the author of this note with any questions.                         Sincerely,        MARY Littlejohn CNP

## 2022-09-01 NOTE — PROGRESS NOTES
[facility] GERIATRICS    PRIMARY CARE PROVIDER AND CLINIC:  [facility], [address] / [facility] [address]  Chief Complaint   Patient presents with     Nursing Home Acute     MD visit      [place] Medical Record Number:  [id number]  Place of Service where encounter took place:  Critical access hospital ([facility])    Nathan Macias  is a 89 year old  (1/12/1933), admitted to the above facility from  [facility]. Hospital stay 8/1/22 through 8/30/22..       Hospital course was reviewed by me, is as per the hospital discharge summary and nurse practitioner note.    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  TTE was normal with a negative bubble study.  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  Patient was maintained on apixaban and statin aspirin was added for a planned 90-day course only.  Residual neurologic deficits of dysarthria, expressive aphasia, ataxia.  Patient also developed delirium felt secondary to CVA and probable underlying cognitive impairment, and urinary retention, initially requiring Pollack catheter placement.  He was ultimately maintained on Seroquel at bedtime and at at bedtime  Hemoglobin A1c was 7.1.  Acute kidney injury present on admission resolved with discontinuation of ACE inhibitor and lisinopril    History is limited secondary to expressive aphasia.  Patient states he feels fine.  He denies pain, chest pain, shortness breath, difficulty swallowing.    CODE STATUS/ADVANCE DIRECTIVES DISCUSSION:  Full Code  CPR/Full code   ALLERGIES: No Known Allergies   PAST MEDICAL HISTORY:   Past Medical History:   Diagnosis Date     A-fib (H)      BPH  (benign prostatic hyperplasia)      CAD (coronary artery disease)      DMII (diabetes mellitus, type 2) (H)      TIA (transient ischemic attack)    Peptic ulcer disease      PAST SURGICAL HISTORY:   has a past surgical history that includes back surgery and Tongue surgery.  FAMILY HISTORY: Unable to obtain secondary to patient's expressive aphasia  SOCIAL HISTORY:   reports that he quit smoking about 28 years ago. His smoking use included cigars and pipe. He quit after 25.00 years of use. He does not have any smokeless tobacco history on file. He reports that he does not drink alcohol.  Patient's living condition: lives with spouse         Current Outpatient Medications   Medication Sig     acetaminophen (TYLENOL) 500 MG tablet Take 2 tablets (1,000 mg) by mouth every 6 hours as needed for mild pain     apixaban ANTICOAGULANT (ELIQUIS) 5 MG tablet Take 5 mg by mouth 2 times daily     aspirin (ASA) 81 MG chewable tablet Take 1 tablet (81 mg) by mouth daily for 87 days     atorvastatin (LIPITOR) 20 MG tablet Take 10 mg by mouth daily     insulin aspart (NOVOLOG PEN) 100 UNIT/ML pen Inject 1-3 Units Subcutaneous At Bedtime     insulin aspart (NOVOLOG PEN) 100 UNIT/ML pen Inject 1-3 Units Subcutaneous 3 times daily (before meals)     loratadine (CLARITIN) 10 MG tablet Take 10 mg by mouth daily     melatonin 1 MG TABS tablet Take 1 tablet (1 mg) by mouth every evening     metFORMIN (GLUCOPHAGE XR) 500 MG 24 hr tablet Take 500 mg by mouth daily (with breakfast)     metoprolol succinate ER (TOPROL XL) 50 MG 24 hr tablet Take 25 mg by mouth daily     QUEtiapine (SEROQUEL) 25 MG tablet Take 0.5 tablets (12.5 mg) by mouth every 6 hours as needed     QUEtiapine (SEROQUEL) 50 MG tablet Take 1 tablet (50 mg) by mouth At Bedtime     senna-docusate (SENOKOT-S/PERICOLACE) 8.6-50 MG tablet Take 1 tablet by mouth 2 times daily as needed for constipation     tamsulosin (FLOMAX) 0.4 MG capsule Take 0.4 mg by mouth daily     No current  "facility-administered medications for this visit.       ROS:  Limited secondary to aphasia    Vitals:  /70   Pulse 90   Temp 98.2  F (36.8  C)   Resp 18   Ht 1.727 m (5' 8\")   Wt 71.6 kg (157 lb 12.8 oz)   SpO2 99%   BMI 23.99 kg/m    Exam:  Thin appearing male, sitting in his room.  Alert, pleasant  HEENT: Pharynx benign  No eye redness or drainage  Lungs clear  CV irregular, heart rate 70s  Abdomen soft  Extremities no signs of acute arthritis  Neuro: Alert, pleasant, smiling.  Speech is dysarthric.  Patient has significant expressive aphasia.  There are no tremors.  There is no focal weakness.  Gait was not assessed by me.    Lab/Diagnostic data:    Most Recent 3 CBC's:Recent Labs   Lab Test 08/21/22  0902 08/18/22  0218 08/17/22  0755   WBC 7.0 9.0 8.6   HGB 12.8* 13.1* 13.3   MCV 94 94 94    226 295     Most Recent 3 BMP's:Recent Labs   Lab Test 08/30/22  1206 08/30/22  0818 08/30/22  0230 08/29/22  1219 08/29/22  0837 08/21/22  1227 08/21/22  0902 08/18/22  0841 08/18/22  0218 08/17/22  0814 08/17/22  0755   NA  --   --   --   --   --   --  141  --  140  --  138   POTASSIUM  --   --   --   --   --   --  4.7  --  4.7  --  4.7   CHLORIDE  --   --   --   --   --   --  110*  --  109  --  108   CO2  --   --   --   --   --   --  26  --  25  --  27   BUN  --   --   --   --   --   --  67*  --  66*  --  50*   CR  --   --   --   --  1.13  --  1.11  --  1.42*  --  1.08   ANIONGAP  --   --   --   --   --   --  5  --  6  --  3   MAURICIO  --   --   --   --   --   --  8.4*  --  8.7  --  8.9   * 156* 126*   < >  --    < > 171*   < > 180*   < > 213*    < > = values in this interval not displayed.     Most Recent 2 LFT's:No lab results found.  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  Residual aphasia, dysarthria, ataxia.  " History of memory impairment, proceeding CVA.  Postop delirium related to acute CVA and underlying memory impairment  Plan: Continue chronic apixaban and anticipated 90-day course of aspirin.  Continue statin.  Continue therapies.  Monitor swallowing and nutritional status.  Continue Seroquel, wean off as possible    Diabetes mellitus type 2  Plan: Continue metformin, monitor blood glucoses    Hypertension  Chronic atrial fibrillation  Stable.  Vital signs including heart rate  Plan: Continue metoprolol and apixaban PTA losartan and HCT were discontinued during hospital stay    Acute kidney injury  Resolved with holding above medications  Plan: Monitor BMP    Urinary retention  History of BPH  Plan: Monitor voiding, continue tamsulosin which should be administered in the evening        Bryan Hdz MD

## 2022-09-01 NOTE — TELEPHONE ENCOUNTER
[facility] Geriatrics Triage Nurse Telephone Encounter    Provider: MARY Alvarez CNP  Facility: [facility] Facility Type:  TCU    Caller: [place]      Allergies:  No Known Allergies     Reason for call: Pt was standing up from w/c on his own an fell to day, sustained skin on his Rt upper arm measuring 1cm x1.5cm, steri strips applied, no further injury VS stable. Neuro's WNl.     Verbal Order/Direction given by Provider: Continue to monitor per facility protocol.     Provider giving Order:  MARY Alvarez CNP    Verbal Order given to: Phoenix Covarrubias RN

## 2022-09-04 ENCOUNTER — TRANSITIONAL CARE UNIT VISIT (OUTPATIENT)
Dept: GERIATRICS | Facility: CLINIC | Age: 87
End: 2022-09-04
Payer: COMMERCIAL

## 2022-09-04 DIAGNOSIS — E11.59 TYPE 2 DIABETES MELLITUS WITH OTHER CIRCULATORY COMPLICATION, WITHOUT LONG-TERM CURRENT USE OF INSULIN (H): ICD-10-CM

## 2022-09-04 DIAGNOSIS — R33.9 URINARY RETENTION: ICD-10-CM

## 2022-09-04 DIAGNOSIS — I63.9 CEREBELLAR INFARCTION (H): Primary | ICD-10-CM

## 2022-09-04 DIAGNOSIS — I48.19 ATRIAL FIBRILLATION, PERSISTENT (H): ICD-10-CM

## 2022-09-04 DIAGNOSIS — I10 BENIGN ESSENTIAL HYPERTENSION: ICD-10-CM

## 2022-09-04 PROCEDURE — 99305 1ST NF CARE MODERATE MDM 35: CPT | Performed by: INTERNAL MEDICINE

## 2022-09-04 NOTE — LETTER
9/4/2022        RE: Nathan Macias  [id number]  [facility] [address]        [facility] GERIATRICS    PRIMARY CARE PROVIDER AND CLINIC:  [facility], [address] / [facility] [address]  Chief Complaint   Patient presents with     Nursing Home Acute     MD visit      [place] Medical Record Number:  [id number]  Place of Service where encounter took place:  [facility] ([facility])    Nathan Macias  is a 89 year old  (1/12/1933), admitted to the above facility from  [facility]. Hospital stay 8/1/22 through 8/30/22..       Hospital course was reviewed by me, is as per the hospital discharge summary and nurse practitioner note.    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  TTE was normal with a negative bubble study.  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  Patient was maintained on apixaban and statin aspirin was added for a planned 90-day course only.  Residual neurologic deficits of dysarthria, expressive aphasia, ataxia.  Patient also developed delirium felt secondary to CVA and probable underlying cognitive impairment, and urinary retention, initially requiring Pollack catheter placement.  He was ultimately maintained on Seroquel at bedtime and at at bedtime  Hemoglobin A1c was 7.1.  Acute kidney injury present on admission resolved with discontinuation of ACE inhibitor and lisinopril    History is limited secondary to expressive aphasia.  Patient states he feels fine.  He denies pain, chest pain, shortness breath, difficulty swallowing.    CODE STATUS/ADVANCE DIRECTIVES DISCUSSION:  Full Code  CPR/Full code   ALLERGIES: No  Known Allergies   PAST MEDICAL HISTORY:   Past Medical History:   Diagnosis Date     A-fib (H)      BPH (benign prostatic hyperplasia)      CAD (coronary artery disease)      DMII (diabetes mellitus, type 2) (H)      TIA (transient ischemic attack)    Peptic ulcer disease      PAST SURGICAL HISTORY:   has a past surgical history that includes back surgery and Tongue surgery.  FAMILY HISTORY: Unable to obtain secondary to patient's expressive aphasia  SOCIAL HISTORY:   reports that he quit smoking about 28 years ago. His smoking use included cigars and pipe. He quit after 25.00 years of use. He does not have any smokeless tobacco history on file. He reports that he does not drink alcohol.  Patient's living condition: lives with spouse         Current Outpatient Medications   Medication Sig     acetaminophen (TYLENOL) 500 MG tablet Take 2 tablets (1,000 mg) by mouth every 6 hours as needed for mild pain     apixaban ANTICOAGULANT (ELIQUIS) 5 MG tablet Take 5 mg by mouth 2 times daily     aspirin (ASA) 81 MG chewable tablet Take 1 tablet (81 mg) by mouth daily for 87 days     atorvastatin (LIPITOR) 20 MG tablet Take 10 mg by mouth daily     insulin aspart (NOVOLOG PEN) 100 UNIT/ML pen Inject 1-3 Units Subcutaneous At Bedtime     insulin aspart (NOVOLOG PEN) 100 UNIT/ML pen Inject 1-3 Units Subcutaneous 3 times daily (before meals)     loratadine (CLARITIN) 10 MG tablet Take 10 mg by mouth daily     melatonin 1 MG TABS tablet Take 1 tablet (1 mg) by mouth every evening     metFORMIN (GLUCOPHAGE XR) 500 MG 24 hr tablet Take 500 mg by mouth daily (with breakfast)     metoprolol succinate ER (TOPROL XL) 50 MG 24 hr tablet Take 25 mg by mouth daily     QUEtiapine (SEROQUEL) 25 MG tablet Take 0.5 tablets (12.5 mg) by mouth every 6 hours as needed     QUEtiapine (SEROQUEL) 50 MG tablet Take 1 tablet (50 mg) by mouth At Bedtime     senna-docusate (SENOKOT-S/PERICOLACE) 8.6-50 MG tablet Take 1 tablet by mouth 2 times daily as  "needed for constipation     tamsulosin (FLOMAX) 0.4 MG capsule Take 0.4 mg by mouth daily     No current facility-administered medications for this visit.       ROS:  Limited secondary to aphasia    Vitals:  /70   Pulse 90   Temp 98.2  F (36.8  C)   Resp 18   Ht 1.727 m (5' 8\")   Wt 71.6 kg (157 lb 12.8 oz)   SpO2 99%   BMI 23.99 kg/m    Exam:  Thin appearing male, sitting in his room.  Alert, pleasant  HEENT: Pharynx benign  No eye redness or drainage  Lungs clear  CV irregular, heart rate 70s  Abdomen soft  Extremities no signs of acute arthritis  Neuro: Alert, pleasant, smiling.  Speech is dysarthric.  Patient has significant expressive aphasia.  There are no tremors.  There is no focal weakness.  Gait was not assessed by me.    Lab/Diagnostic data:    Most Recent 3 CBC's:Recent Labs   Lab Test 08/21/22  0902 08/18/22  0218 08/17/22  0755   WBC 7.0 9.0 8.6   HGB 12.8* 13.1* 13.3   MCV 94 94 94    226 295     Most Recent 3 BMP's:Recent Labs   Lab Test 08/30/22  1206 08/30/22  0818 08/30/22  0230 08/29/22  1219 08/29/22  0837 08/21/22  1227 08/21/22  0902 08/18/22  0841 08/18/22  0218 08/17/22  0814 08/17/22  0755   NA  --   --   --   --   --   --  141  --  140  --  138   POTASSIUM  --   --   --   --   --   --  4.7  --  4.7  --  4.7   CHLORIDE  --   --   --   --   --   --  110*  --  109  --  108   CO2  --   --   --   --   --   --  26  --  25  --  27   BUN  --   --   --   --   --   --  67*  --  66*  --  50*   CR  --   --   --   --  1.13  --  1.11  --  1.42*  --  1.08   ANIONGAP  --   --   --   --   --   --  5  --  6  --  3   MAURICIO  --   --   --   --   --   --  8.4*  --  8.7  --  8.9   * 156* 126*   < >  --    < > 171*   < > 180*   < > 213*    < > = values in this interval not displayed.     Most Recent 2 LFT's:No lab results found.  Most Recent Hemoglobin A1c:Recent Labs   Lab Test 08/01/22  1430   A1C 7.1*       ASSESSMENT/PLAN:      Acute right cerebellar infarction, etiology unclear but " likely artery to artery involving the right vertebral artery.  Residual aphasia, dysarthria, ataxia.  History of memory impairment, proceeding CVA.  Postop delirium related to acute CVA and underlying memory impairment  Plan: Continue chronic apixaban and anticipated 90-day course of aspirin.  Continue statin.  Continue therapies.  Monitor swallowing and nutritional status.  Continue Seroquel, wean off as possible    Diabetes mellitus type 2  Plan: Continue metformin, monitor blood glucoses    Hypertension  Chronic atrial fibrillation  Stable.  Vital signs including heart rate  Plan: Continue metoprolol and apixaban PTA losartan and HCT were discontinued during hospital stay    Acute kidney injury  Resolved with holding above medications  Plan: Monitor BMP    Urinary retention  History of BPH  Plan: Monitor voiding, continue tamsulosin which should be administered in the evening        Bryan Hdz MD                        Sincerely,        Bryan Hdz MD

## 2022-09-06 PROBLEM — Z79.4 TYPE 2 DIABETES MELLITUS WITH OTHER SPECIFIED COMPLICATION, WITH LONG-TERM CURRENT USE OF INSULIN (H): Status: ACTIVE | Noted: 2022-09-06

## 2022-09-06 PROBLEM — I48.91 ATRIAL FIBRILLATION, UNSPECIFIED TYPE (H): Status: ACTIVE | Noted: 2022-09-06

## 2022-09-06 PROBLEM — E11.69 TYPE 2 DIABETES MELLITUS WITH OTHER SPECIFIED COMPLICATION, WITH LONG-TERM CURRENT USE OF INSULIN (H): Status: ACTIVE | Noted: 2022-09-06

## 2022-09-15 ENCOUNTER — DISCHARGE SUMMARY NURSING HOME (OUTPATIENT)
Dept: GERIATRICS | Facility: CLINIC | Age: 87
End: 2022-09-15
Payer: COMMERCIAL

## 2022-09-15 VITALS
HEIGHT: 68 IN | HEART RATE: 78 BPM | DIASTOLIC BLOOD PRESSURE: 74 MMHG | WEIGHT: 163.2 LBS | TEMPERATURE: 97.6 F | OXYGEN SATURATION: 98 % | RESPIRATION RATE: 16 BRPM | SYSTOLIC BLOOD PRESSURE: 134 MMHG | BODY MASS INDEX: 24.74 KG/M2

## 2022-09-15 DIAGNOSIS — N17.9 ACUTE KIDNEY INJURY (H): ICD-10-CM

## 2022-09-15 DIAGNOSIS — R53.81 PHYSICAL DECONDITIONING: ICD-10-CM

## 2022-09-15 DIAGNOSIS — I48.91 ATRIAL FIBRILLATION, UNSPECIFIED TYPE (H): ICD-10-CM

## 2022-09-15 DIAGNOSIS — R41.89 COGNITIVE IMPAIRMENT: ICD-10-CM

## 2022-09-15 DIAGNOSIS — R91.8 PULMONARY NODULES: ICD-10-CM

## 2022-09-15 DIAGNOSIS — I10 ESSENTIAL HYPERTENSION: ICD-10-CM

## 2022-09-15 DIAGNOSIS — E11.69 TYPE 2 DIABETES MELLITUS WITH OTHER SPECIFIED COMPLICATION, WITH LONG-TERM CURRENT USE OF INSULIN (H): ICD-10-CM

## 2022-09-15 DIAGNOSIS — N40.1 BENIGN PROSTATIC HYPERPLASIA WITH URINARY RETENTION: ICD-10-CM

## 2022-09-15 DIAGNOSIS — R41.0 DELIRIUM: ICD-10-CM
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
## 2022-09-15 DIAGNOSIS — I63.9 CEREBROVASCULAR ACCIDENT (CVA), UNSPECIFIED MECHANISM (H): Primary | ICD-10-CM

## 2022-09-15 DIAGNOSIS — Z79.4 TYPE 2 DIABETES MELLITUS WITH OTHER SPECIFIED COMPLICATION, WITH LONG-TERM CURRENT USE OF INSULIN (H): ICD-10-CM

## 2022-09-15 DIAGNOSIS — E78.5 HYPERLIPIDEMIA LDL GOAL <130: ICD-10-CM

## 2022-09-15 DIAGNOSIS — R33.8 BENIGN PROSTATIC HYPERPLASIA WITH URINARY RETENTION: ICD-10-CM

## 2022-09-15 DIAGNOSIS — R33.9 URINARY RETENTION: ICD-10-CM

## 2022-09-15 PROCEDURE — 99316 NF DSCHRG MGMT 30 MIN+: CPT

## 2022-09-15 RX ORDER — ACETAMINOPHEN 500 MG
1000 TABLET ORAL 3 TIMES DAILY PRN
Qty: 30 TABLET | Refills: 0 | COMMUNITY
Start: 2022-09-15 | End: 2024-03-07

## 2022-09-15 NOTE — LETTER
9/15/2022        RE: Nathan Macias  [address]  [facility] [address]        [facility] GERIATRICS DISCHARGE SUMMARY  PATIENT'S NAME: Nathan Macias  YOB: 1933  MEDICAL RECORD NUMBER:  [id number]  Place of Service where encounter took place:  [facility] [[id number]]    PRIMARY CARE PROVIDER AND CLINIC RESPONSIBLE AFTER TRANSFER:   [facility], [address] / [facility] [address]    Non-FMG Provider     Transferring providers: Boo Senior DNP APRN CNP; Bryan Hdz MD  Recent Hospitalization/ED:  [facility] Hospital stay 8/1/22 to 8/30/22.  Date of SNF Admission: August 30, 2022  Date of SNF (anticipated) Discharge: September 16, 2022  Discharged to: Previous independent home  Cognitive Scores: SBT 17/28, CPT 4.3/5.6  Physical Function: Ambulating over 600 feet with FWW  DME: No new DME needed    CODE STATUS/ADVANCE DIRECTIVES DISCUSSION:  Full Code   ALLERGIES: Patient has no known allergies.    NURSING FACILITY COURSE   Medication Changes/Rationale:     Insulin discontinued    Summary of nursing facility stay:   Patient hospitalized for the evaluation of ataxia, ,dysarthria, and aphasia. He was found to have an acute ischemic CVA in the right cerebellum.  TTE was normal with a negative bubble study.  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 He is on apixaban, statin and aspirn-only 90-day course.  Residual neurologic deficits of dysarthria, expressive aphasia, and ataxia.  Hospital course was significant for delirium (stable on Seroquel), urinary retention ( s/p Pollack catheter now resolved), and acute kidney injury now resolved with discontinuation of offending medications.    He is being seen today for discharge orders.  History limited due to expressive aphasia.  He did fine throughout his TCU  stay.  Able to ambulate over 600 feet using FWW.  Had a fall in TCU sustaining right upper extremity skin tear (improved now) [id number] x1.5cm.  Denies pain.  Hemodynamically stable.  No reports of chest pain or shortness of breath. [facility] and [facility].  Tolerated ordered diet. He is discharging home tomorrow with wife with orders in place for HCC. Nursing staff and  without acute concerns.        Discharge Medications:  Post Medication Reconciliation Status: Medication reconciliation previously completed during another office visit     Current Outpatient Medications   Medication Sig Dispense Refill     acetaminophen (TYLENOL) 500 MG tablet Take 2 tablets (1,000 mg) by mouth 3 times daily as needed for mild pain 30 tablet 0     apixaban ANTICOAGULANT (ELIQUIS) 5 MG tablet Take 5 mg by mouth 2 times daily       aspirin (ASA) 81 MG chewable tablet Take 1 tablet (81 mg) by mouth daily for 87 days 87 tablet 0     atorvastatin (LIPITOR) 20 MG tablet Take 10 mg by mouth daily       melatonin 1 MG TABS tablet Take 1 tablet (1 mg) by mouth every evening       metFORMIN (GLUCOPHAGE XR) 500 MG 24 hr tablet Take 500 mg by mouth daily (with breakfast)       metoprolol succinate ER (TOPROL XL) 50 MG 24 hr tablet Take 25 mg by mouth daily       QUEtiapine (SEROQUEL) 50 MG tablet Take 1 tablet (50 mg) by mouth At Bedtime       senna-docusate (SENOKOT-S/PERICOLACE) 8.6-50 MG tablet Take 1 tablet by mouth 2 times daily as needed for constipation       tamsulosin (FLOMAX) 0.4 MG capsule Take 0.4 mg by mouth daily       loratadine (CLARITIN) 10 MG tablet Take 10 mg by mouth daily        Controlled medications:   not applicable/none     Past Medical History:   Past Medical History:   Diagnosis Date     A-fib (H)      BPH (benign prostatic hyperplasia)      CAD (coronary artery disease)      DMII (diabetes mellitus, type 2) (H)      TIA (transient ischemic attack)      Physical Exam:  "  Vitals: /74   Pulse 78   Temp 97.6  F (36.4  C)   Resp 16   Ht 1.727 m (5' 8\")   Wt 74 kg (163 lb 3.2 oz)   SpO2 98%   BMI 24.81 kg/m    BMI: Body mass index is 24.81 kg/m .     Exam:  GENERAL APPEARANCE: No acute distress, well groomed, appropriately dressed   HEENT-EARS: No discharge/drainage, [place]  HEENT-NECK: No lymphadenopathy  HEENT-EYES: PERRLA positive, no drainage/discharge  HEENT-NOSE/MOUTH/THROAT: No nasal drainage or erythema, mucous membranes moist, no throat erythema   RESPIRATORY: Lungs clear to auscultation bilateral, even and unlabored, symmetrical chest wall expansion  CARDIOVASCULAR: RRR, no peripheral edema, S1/S2 normal, pulses positive  GASTROINTESTINAL: Soft, nontender, nondistended, bowel sounds positive all quadrants  MUSCULOSKELETAL: No joint deformities, able to move all extremities   INTEGUMENTARY: Right upper extremity minimal bruising, no complications  NEUROLOGICAL: Alert and awake, positive for expressive aphasia  PSYCHOLOGICAL: Cooperative, calm, pleasant    Assessment and plan:  Acute ischemic stroke of right cerebellum  Delirium likely due to acute CVA and underlying memory impairment  Probable underlying cognitive impairment  Comment: Residual aphasia, dysarthria, and ataxia, underlying history of memory impairment  Plan: Continue apixaban, statin, Seroquel ,and aspirin per order, OT/PT/SLT, neurology f/u, monitor swallowing and nutritional status     Fall  Physical deconditioning  Comment: Ataxia 2/2 stroke, high fall risk, had a fall in the hospital and TCU  Plan: OT/PT, fall precautions     HERNAN  Comment: Stable, hydrochlorothiazide and lisinopril discontinued in the hospital  Plan: Monitor BMP, monitor for signs of worsening kidney function     A. Fib  Hypertension  Comment: Chronic, BP and pulse stable   Plan: Continue apixaban and metoprolol, monitor BP and pulse, labs periodically     Type 2 diabetes mellitus   Hyperlipidemia  Comment: Chronic, blood glucose " 100s-200s  Plan: Continue metformin, statin, and apixaban, labs periodically, monitor blood glucose     BPH  Acute urinary retention  Comment: Pollack discontinued before hospital discharge, voiding fine   Plan: Continue Flomax, PVR as needed, urology follow-up if indicated, monitor for signs of retention     Pulmonary nodule, 3mm  Comment: Incidental finding, recommended repeat CT in 12 months  Plan: PCP follow-up, monitor for changes in respiratory status     SNF labs: Labs done in SNF are in [place] EPIC. Please refer to them using Get In/Care Everywhere.    DISCHARGE PLAN:    Follow up labs: Defer to PCP      Medical Follow Up:     Follow up with primary care provider in 3-5 days  [facility]      Discharge Services: Home Care:  Occupational Therapy, Physical Therapy, Speech Therapy , Registered Nurse and Home Health Aide    Discharge Instructions Verbalized to Patient at Discharge:     Continue to follow your diet:  Carbohydrate Controlled Diet.     TOTAL DISCHARGE TIME:   Greater than 30 minutes  Electronically signed by:  Boo Senior,ERIC,APRN,AGNP-BC.       Documentation of Face to Face and Certification for Home Health Services    I certify that patient: Nathan Macias is under my care and that I, or a nurse practitioner or physician's assistant working with me, had a face-to-face encounter that meets the physician face-to-face encounter requirements with this patient on: 9/15/22.    This encounter with the patient was in whole, or in part, for the following medical condition, which is the primary reason for home health care:   -Acute CVA  -Dysarthria/expressive aphasia/ataxia, CVA sequelae  -Physical deconditioning  -Fall risk    I certify that, based on my findings, the following services are medically necessary home health services: Nursing, Occupational Therapy, Physical Therapy, Speech Language Therapy and HHA.    My clinical findings support the need for the above  services because: Nurse is needed to assess labs, changes in medications or other medical regimen; Home health aide need for assistance with ADLs; Occupational Therapy Services are needed to assess and treat cognitive ability and address ADL safety due to acute CVA; Physical Therapy Services are needed to assess and treat the following functional impairments:  Acute CVA and physical deconditioning; Speech Therapy Services are needed to assess and treat impairments in language and/or swallow functions due to acute CVA.      Further, I certify that my clinical findings support that this patient is homebound (i.e. absences from home require considerable and taxing effort and are for medical reasons or [employer] services or infrequently or of short duration when for other reasons) because: Requires assistance of another person or specialized equipment to access medical services because patient unable to exit home safely on own due to acute CVA, physical deconditioning and repeated falls.       Based on the above findings, I certify that this patient is confined to the home and needs intermittent skilled nursing care, HHA, physical therapy, occupational therapy, and speech therapy.  The patient is under my care, and I have initiated the establishment of the plan of care.  This patient will be followed by a physician who will periodically review the plan of care.    Physician/Provider to provide follow up care: [facility].     Attending hospital physician (the Medicare certified PECOS provider): Bryan Hdz  Physician Signature: See electronic signature associated with these discharge orders.  Date: 9/15/22        MEDICAL NECESSITY STATEMENT FOR DME    Demographic Information on Nathan Macias:    Nathan Macias  Gender: male  : 1933  [address]   [facility] [address]  [phone] (home)     Medical Record: [id number]  Social Security Number:  [id number]  Primary Care Provider: [facility]  Insurance: Payor: HUMANA / Plan: HUMANA MEDICARE ADVANTAGE / Product Type: Medicare /       Cerebrovascular accident (CVA), unspecified mechanism (H) [I63.9]    DME: Manual wheelchair    This is a face-to-face note.  The patient was seen on 9/15/22 for mobility equipment recommendation:    Patient has mobility limitations that can significantly impair his ability to participate in mobility related activities of daily living (MRADL's) due to recent CVA.    Mobility limitations cannot be sufficiently and safely resolved by use of cane, walker, or crutches due to decreased safety, balance and strength.    Patient or caregivers can safely use the manual wheelchair.    Patient's functional mobility deficit can be sufficiently resolved by use of a manual wheelchair.    Patient's home provides adequate access between rooms, maneuvering space and surfaces for use of the manual wheelchair.    Patient has not expressed an unwillingness to use the manual wheelchair that would be provided.       MEDICAL NECESSITY STATEMENT: Length of need is LIFETIME.    Cerebrovascular accident (CVA), unspecified mechanism (H) [I63.9]    ELECTRONICALLY SIGNED BY GLEN CERTIFIED PROVIDER:  MARY Littlejohn CNP   NPI: [id number]  [place] [facility]  [facility], [address]  [place] [address]                    Sincerely,        MARY Littlejohn CNP

## 2022-09-15 NOTE — PROGRESS NOTES
[facility] GERIATRICS DISCHARGE SUMMARY  PATIENT'S NAME: Nathan aMcias  YOB: 1933  MEDICAL RECORD NUMBER:  [id number]  Place of Service where encounter took place:  [facility] ([facility] [[id number]]    PRIMARY CARE PROVIDER AND CLINIC RESPONSIBLE AFTER TRANSFER:   [facility], [address] / [facility] [address]    Non-FMG Provider     Transferring providers: Boo Senior DNP APRN CNP; Bryan Hdz MD  Recent Hospitalization/ED:  [facility] stay 8/1/22 to 8/30/22.  Date of SNF Admission: August 30, 2022  Date of SNF (anticipated) Discharge: September 16, 2022  Discharged to: Previous independent home  Cognitive Scores: SBT 17/28, CPT 4.3/5.6  Physical Function: Ambulating over 600 feet with FWW  DME: No new DME needed    CODE STATUS/ADVANCE DIRECTIVES DISCUSSION:  Full Code   ALLERGIES: Patient has no known allergies.    NURSING FACILITY COURSE   Medication Changes/Rationale:     Insulin discontinued    Summary of nursing facility stay:   Patient hospitalized for the evaluation of ataxia, ,dysarthria, and aphasia. He was found to have an acute ischemic CVA in the right cerebellum.  TTE was normal with a negative bubble study.  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 He is on apixaban, statin and aspirn-only 90-day course.  Residual neurologic deficits of dysarthria, expressive aphasia, and ataxia.  Hospital course was significant for delirium (stable on Seroquel), urinary retention ( s/p Pollack catheter now resolved), and acute kidney injury now resolved with discontinuation of offending medications.    He is being seen today for discharge orders.  History limited due to expressive aphasia.  He did fine throughout his TCU stay.  Able to ambulate over 600 feet using FWW.  Had a fall in TCU sustaining right upper extremity skin  "tear (improved now) [id number] x1.5cm.  Denies pain.  Hemodynamically stable.  No reports of chest pain or shortness of breath. [facility] and [facility] notified.  Tolerated ordered diet. He is discharging home tomorrow with wife with orders in place for HCC. Nursing staff and  without acute concerns.        Discharge Medications:  Post Medication Reconciliation Status: Medication reconciliation previously completed during another office visit     Current Outpatient Medications   Medication Sig Dispense Refill     acetaminophen (TYLENOL) 500 MG tablet Take 2 tablets (1,000 mg) by mouth 3 times daily as needed for mild pain 30 tablet 0     apixaban ANTICOAGULANT (ELIQUIS) 5 MG tablet Take 5 mg by mouth 2 times daily       aspirin (ASA) 81 MG chewable tablet Take 1 tablet (81 mg) by mouth daily for 87 days 87 tablet 0     atorvastatin (LIPITOR) 20 MG tablet Take 10 mg by mouth daily       melatonin 1 MG TABS tablet Take 1 tablet (1 mg) by mouth every evening       metFORMIN (GLUCOPHAGE XR) 500 MG 24 hr tablet Take 500 mg by mouth daily (with breakfast)       metoprolol succinate ER (TOPROL XL) 50 MG 24 hr tablet Take 25 mg by mouth daily       QUEtiapine (SEROQUEL) 50 MG tablet Take 1 tablet (50 mg) by mouth At Bedtime       senna-docusate (SENOKOT-S/PERICOLACE) 8.6-50 MG tablet Take 1 tablet by mouth 2 times daily as needed for constipation       tamsulosin (FLOMAX) 0.4 MG capsule Take 0.4 mg by mouth daily       loratadine (CLARITIN) 10 MG tablet Take 10 mg by mouth daily        Controlled medications:   not applicable/none     Past Medical History:   Past Medical History:   Diagnosis Date     A-fib (H)      BPH (benign prostatic hyperplasia)      CAD (coronary artery disease)      DMII (diabetes mellitus, type 2) (H)      TIA (transient ischemic attack)      Physical Exam:   Vitals: /74   Pulse 78   Temp 97.6  F (36.4  C)   Resp 16   Ht 1.727 m (5' 8\")   Wt 74 kg (163 " lb 3.2 oz)   SpO2 98%   BMI 24.81 kg/m    BMI: Body mass index is 24.81 kg/m .     Exam:  GENERAL APPEARANCE: No acute distress, well groomed, appropriately dressed   HEENT-EARS: No discharge/drainage, [place]  HEENT-NECK: No lymphadenopathy  HEENT-EYES: PERRLA positive, no drainage/discharge  HEENT-NOSE/MOUTH/THROAT: No nasal drainage or erythema, mucous membranes moist, no throat erythema   RESPIRATORY: Lungs clear to auscultation bilateral, even and unlabored, symmetrical chest wall expansion  CARDIOVASCULAR: RRR, no peripheral edema, S1/S2 normal, pulses positive  GASTROINTESTINAL: Soft, nontender, nondistended, bowel sounds positive all quadrants  MUSCULOSKELETAL: No joint deformities, able to move all extremities   INTEGUMENTARY: Right upper extremity minimal bruising, no complications  NEUROLOGICAL: Alert and awake, positive for expressive aphasia  PSYCHOLOGICAL: Cooperative, calm, pleasant    Assessment and plan:  Acute ischemic stroke of right cerebellum  Delirium likely due to acute CVA and underlying memory impairment  Probable underlying cognitive impairment  Comment: Residual aphasia, dysarthria, and ataxia, underlying history of memory impairment  Plan: Continue apixaban, statin, Seroquel ,and aspirin per order, OT/PT/SLT, neurology f/u, monitor swallowing and nutritional status     Fall  Physical deconditioning  Comment: Ataxia 2/2 stroke, high fall risk, had a fall in the hospital and TCU  Plan: OT/PT, fall precautions     HERNAN  Comment: Stable, hydrochlorothiazide and lisinopril discontinued in the hospital  Plan: Monitor BMP, monitor for signs of worsening kidney function     A. Fib  Hypertension  Comment: Chronic, BP and pulse stable   Plan: Continue apixaban and metoprolol, monitor BP and pulse, labs periodically     Type 2 diabetes mellitus   Hyperlipidemia  Comment: Chronic, blood glucose 100s-200s  Plan: Continue metformin, statin, and apixaban, labs periodically, monitor blood  glucose     BPH  Acute urinary retention  Comment: Pollack discontinued before hospital discharge, voiding fine   Plan: Continue Flomax, PVR as needed, urology follow-up if indicated, monitor for signs of retention     Pulmonary nodule, 3mm  Comment: Incidental finding, recommended repeat CT in 12 months  Plan: PCP follow-up, monitor for changes in respiratory status     SNF labs: Labs done in SNF are in [place] EPIC. Please refer to them using EPIC/Care Everywhere.    DISCHARGE PLAN:    Follow up labs: Defer to PCP      Medical Follow Up:     Follow up with primary care provider in 3-5 days  [facility]      Discharge Services: Home Care:  Occupational Therapy, Physical Therapy, Speech Therapy , Registered Nurse and Home Health Aide    Discharge Instructions Verbalized to Patient at Discharge:     Continue to follow your diet:  Carbohydrate Controlled Diet.     TOTAL DISCHARGE TIME:   Greater than 30 minutes  Electronically signed by:  Boo Senior,ERIC,APRN,AGNP-BC.       Documentation of Face to Face and Certification for Home Health Services    I certify that patient: Nathan Macias is under my care and that I, or a nurse practitioner or physician's assistant working with me, had a face-to-face encounter that meets the physician face-to-face encounter requirements with this patient on: 9/15/22.    This encounter with the patient was in whole, or in part, for the following medical condition, which is the primary reason for home health care:   -Acute CVA  -Dysarthria/expressive aphasia/ataxia, CVA sequelae  -Physical deconditioning  -Fall risk    I certify that, based on my findings, the following services are medically necessary home health services: Nursing, Occupational Therapy, Physical Therapy, Speech Language Therapy and HHA.    My clinical findings support the need for the above services because: Nurse is needed to assess labs, changes in medications or other medical  regimen; Home health aide need for assistance with ADLs; Occupational Therapy Services are needed to assess and treat cognitive ability and address ADL safety due to acute CVA; Physical Therapy Services are needed to assess and treat the following functional impairments:  Acute CVA and physical deconditioning; Speech Therapy Services are needed to assess and treat impairments in language and/or swallow functions due to acute CVA.      Further, I certify that my clinical findings support that this patient is homebound (i.e. absences from home require considerable and taxing effort and are for medical reasons or [employer] services or infrequently or of short duration when for other reasons) because: Requires assistance of another person or specialized equipment to access medical services because patient unable to exit home safely on own due to acute CVA, physical deconditioning and repeated falls.       Based on the above findings, I certify that this patient is confined to the home and needs intermittent skilled nursing care, HHA, physical therapy, occupational therapy, and speech therapy.  The patient is under my care, and I have initiated the establishment of the plan of care.  This patient will be followed by a physician who will periodically review the plan of care.    Physician/Provider to provide follow up care: [facility], [facility].     Attending hospital physician (the Medicare certified PECOS provider): Bryan Hdz  Physician Signature: See electronic signature associated with these discharge orders.  Date: 9/15/22        MEDICAL NECESSITY STATEMENT FOR DME    Demographic Information on Nathan Macias:    Nathan Macias  Gender: male  : 1933  [id number]   [facility] [address]  [phone] (home)     Medical Record: [id number]  Social Security Number: [id number]  Primary Care Provider: [facility]  Insurance: Payor: HUMANA /  Plan: HUMANA MEDICARE ADVANTAGE / Product Type: Medicare /       Cerebrovascular accident (CVA), unspecified mechanism (H) [I63.9]    DME: Manual wheelchair    This is a face-to-face note.  The patient was seen on 9/15/22 for mobility equipment recommendation:    Patient has mobility limitations that can significantly impair his ability to participate in mobility related activities of daily living (MRADL's) due to recent CVA.    Mobility limitations cannot be sufficiently and safely resolved by use of cane, walker, or crutches due to decreased safety, balance and strength.    Patient or caregivers can safely use the manual wheelchair.    Patient's functional mobility deficit can be sufficiently resolved by use of a manual wheelchair.    Patient's home provides adequate access between rooms, maneuvering space and surfaces for use of the manual wheelchair.    Patient has not expressed an unwillingness to use the manual wheelchair that would be provided.       MEDICAL NECESSITY STATEMENT: Length of need is LIFETIME.    Cerebrovascular accident (CVA), unspecified mechanism (H) [I63.9]    ELECTRONICALLY SIGNED BY GLEN CERTIFIED PROVIDER:  MARY Littlejohn CNP   NPI: [id number]  [facility]  [address] [place] [address], [address]  [place] [address]

## 2023-10-22 ENCOUNTER — APPOINTMENT (OUTPATIENT)
Dept: CT IMAGING | Facility: CLINIC | Age: 88
End: 2023-10-22
Attending: STUDENT IN AN ORGANIZED HEALTH CARE EDUCATION/TRAINING PROGRAM
Payer: COMMERCIAL

## 2023-10-22 ENCOUNTER — HOSPITAL ENCOUNTER (EMERGENCY)
Facility: CLINIC | Age: 88
Discharge: HOME | End: 2023-10-22
Attending: STUDENT IN AN ORGANIZED HEALTH CARE EDUCATION/TRAINING PROGRAM | Admitting: STUDENT IN AN ORGANIZED HEALTH CARE EDUCATION/TRAINING PROGRAM
Payer: COMMERCIAL

## 2023-10-22 ENCOUNTER — APPOINTMENT (OUTPATIENT)
Dept: GENERAL RADIOLOGY | Facility: CLINIC | Age: 88
End: 2023-10-22
Attending: STUDENT IN AN ORGANIZED HEALTH CARE EDUCATION/TRAINING PROGRAM
Payer: COMMERCIAL

## 2023-10-22 VITALS
SYSTOLIC BLOOD PRESSURE: 138 MMHG | OXYGEN SATURATION: 98 % | TEMPERATURE: 98.4 F | HEART RATE: 86 BPM | WEIGHT: 160 LBS | BODY MASS INDEX: 24.33 KG/M2 | RESPIRATION RATE: 20 BRPM | DIASTOLIC BLOOD PRESSURE: 78 MMHG
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
## 2023-10-22 DIAGNOSIS — S30.0XXA TRAUMATIC HEMATOMA OF BUTTOCK, INITIAL ENCOUNTER: ICD-10-CM

## 2023-10-22 DIAGNOSIS — M25.551 HIP PAIN, RIGHT: ICD-10-CM

## 2023-10-22 LAB
HCT VFR BLD AUTO: 31.9 % (ref 40–53)
HGB BLD-MCNC: 10.9 G/DL (ref 13.3–17.7)
HOLD SPECIMEN: NORMAL
HOLD SPECIMEN: NORMAL

## 2023-10-22 PROCEDURE — 36415 COLL VENOUS BLD VENIPUNCTURE: CPT | Performed by: STUDENT IN AN ORGANIZED HEALTH CARE EDUCATION/TRAINING PROGRAM

## 2023-10-22 PROCEDURE — 72192 CT PELVIS W/O DYE: CPT

## 2023-10-22 PROCEDURE — 70450 CT HEAD/BRAIN W/O DYE: CPT

## 2023-10-22 PROCEDURE — 85018 HEMOGLOBIN: CPT | Performed by: STUDENT IN AN ORGANIZED HEALTH CARE EDUCATION/TRAINING PROGRAM

## 2023-10-22 PROCEDURE — 73552 X-RAY EXAM OF FEMUR 2/>: CPT | Mod: RT

## 2023-10-22 PROCEDURE — 99285 EMERGENCY DEPT VISIT HI MDM: CPT | Mod: 25

## 2023-10-22 PROCEDURE — 72125 CT NECK SPINE W/O DYE: CPT

## 2023-10-22 ASSESSMENT — ACTIVITIES OF DAILY LIVING (ADL)
ADLS_ACUITY_SCORE: 35
ADLS_ACUITY_SCORE: 35

## 2023-10-22 NOTE — ED PROVIDER NOTES
History     Chief Complaint:  Fall and Leg Injury       HPI   Nathan Macias is a 89 year old male history of A-fib on apixaban, coronary artery disease, type 2 diabetes, CVA, brought in by family member for fall 4 days ago.  Patient was going to the bathroom and it was dark and he tried sitting on the toilet but excellently fell.  Did not lose consciousness.  Did hit the top of his head.  Has a wound on the top of his head that was cleaned out by family member.  Tetanus is up-to-date.  No vomiting.  No headache or neck pain.  Came in today because he is got pain over his buttocks and right hip and posterior thigh and a large bruise over that area.  Has been able to ambulate.  No chest pain, shortness of breath, or abdominal pain.         Independent Historian:    Family member assists with history.    Review of External Notes:  Discharge summary August 30, 2022 for CVA.    Medications:    acetaminophen (TYLENOL) 500 MG tablet  apixaban ANTICOAGULANT (ELIQUIS) 5 MG tablet  atorvastatin (LIPITOR) 20 MG tablet  loratadine (CLARITIN) 10 MG tablet  melatonin 1 MG TABS tablet  metFORMIN (GLUCOPHAGE XR) 500 MG 24 hr tablet  metoprolol succinate ER (TOPROL XL) 50 MG 24 hr tablet  QUEtiapine (SEROQUEL) 50 MG tablet  senna-docusate (SENOKOT-S/PERICOLACE) 8.6-50 MG tablet  tamsulosin (FLOMAX) 0.4 MG capsule        Past Medical History:    Past Medical History:   Diagnosis Date    A-fib (H)     BPH (benign prostatic hyperplasia)     CAD (coronary artery disease)     DMII (diabetes mellitus, type 2) (H)     TIA (transient ischemic attack)        Past Surgical History:    Past Surgical History:   Procedure Laterality Date    BACK SURGERY      1990s    TONGUE SURGERY      due to precancerous lesion          Physical Exam   Patient Vitals for the past 24 hrs:   BP Temp Temp src Pulse Resp SpO2 Weight   10/22/23 1108 138/78 98.4  F (36.9  C) Temporal 86 20 98 % 72.6 kg (160 lb)        Physical Exam  GENERAL: Patient  well-appearing. [place]  HEAD: Old abrasion top of scalp in 2 spots without crepitus or bony deformity.  No melissa sign, raccoon eyes or CSF leak  Eyes: Anicteric. PERRL.   NOSE: No active bleeding  MOUTH: Moist mucosa  THROAT: Patent airway. Pharynx clear.   Neck: No rigidity  Back: No midline spinal tenderness, crepitus or gross deformity  CV: RRR, no murmurs rubs or gallops  PULM: CTAB with good aeration; no retractions, rales, rhonchi, or wheezing  ABD: Soft, nontender, nondistended, no guarding, no peritoneal signs, no bruising  DERM: Large ecchymosis right posterior thigh and right [place].  Skin warm and dry  EXTREMITY: Moving all extremities.  Mild pain with movement of the right hip.  Pelvis: Stable.  Tenderness over right hip.  Palpable well-circumscribed tender lump right gluteus without crepitus.  VASCULAR: Symmetric pulses bilaterally  Back: No midline CT or L-spine tenderness.  Tenderness over the right sacrum.    Emergency Department Course        Imaging:  XR Femur Right 2 Views   Final Result   IMPRESSION: No fracture. Mild degenerative changes in the right hip.      CT Pelvis Bone wo Contrast   Final Result   IMPRESSION:   1.  No fracture.      2.  Acute intramuscular hematoma in the inferior portion of the right gluteus rosangela muscle.      3.  Mild degenerative changes in both hips.            CT Cervical Spine w/o Contrast   Final Result   IMPRESSION:   HEAD CT:   1.  Chronic right cerebellar infarct.   2.  Mild presumed chronic small vessel ischemic change.      CERVICAL SPINE CT:   1.  No CT evidence for acute fracture or post traumatic subluxation.   2.  High-grade right C2-C3 through C5-C6 and bilateral C6-C7 neural foraminal stenoses due to chronic degeneration.   3.  No high-grade spinal canal stenosis.      CT Head w/o Contrast   Final Result   IMPRESSION:   HEAD CT:   1.  Chronic right cerebellar infarct.   2.  Mild presumed chronic small vessel ischemic change.      CERVICAL SPINE CT:   1.   No CT evidence for acute fracture or post traumatic subluxation.   2.  High-grade right C2-C3 through C5-C6 and bilateral C6-C7 neural foraminal stenoses due to chronic degeneration.   3.  No high-grade spinal canal stenosis.        Report per radiology    Laboratory:  Labs Ordered and Resulted from Time of ED Arrival to Time of ED Departure   HEMOGLOBIN AND HEMATOCRIT - Abnormal       Result Value    Hemoglobin 10.9 (*)     Hematocrit 31.9 (*)              Emergency Department Course & Assessments:             Interventions:  Medications - No data to display          Independent Interpretation (X-rays, CTs, rhythm strip):  CT with hematoma in the right glute  X-ray femur-no fracture.  Consultations/Discussion of Management or Tests:  none    Social Determinants of Health affecting care:  none     Disposition:  The patient was discharged to home.     Impression & Plan         Medical Decision Making:  Patient presenting after mechanical fall 4 days ago.    Chronic conditions complicating -on Eliquis.    DDx considered, but not limited to fracture, intracranial bleed, syncope, however evaluation not consistent with these or other ominous etiologies.    CT head and C-spine unremarkable.     Patient has a large bruise over his right hamstring and right glutes.  CT pelvis showing a hematoma in the right gluteus muscle otherwise reassuring.  X-ray femur negative for fracture.    Hemoglobin is 10.9.  Over a year ago he was at 12.8.  Patient does not feel lightheaded or short of breath or fatigued therefore, do not think he has excess blood loss into this hematoma.    Shared decision making with patient and his family member.  I recommended holding anticoagulation until they can follow-up with his doctor on Wednesday for repeat hematoma assessment.  I discussed the risks and benefits of holding it with increased risk of stroke.  They understand and are content with this plan.    Patient able to ambulate successfully  without assistance.    Patient was evaluated for acute medical emergencies. Based on my clinical assessment, I do not think any further acute management or work-up is required.  Patient stable for discharge. Given strict return precautions. All questions answered. Patient content with plan. Recommended PCP follow-up in 2-3 days.      Critical Care time:  was 0 minutes for this patient excluding procedures.    Diagnosis:    ICD-10-CM    1. Fall, initial encounter  W19.XXXA       2. Traumatic hematoma of buttock, initial encounter  S30.0XXA       3. Hip pain, right  M25.551            Discharge Medications:  New Prescriptions    No medications on file          Andre Henriquez MD  10/22/2023   Andre Henriquez MD Foss, Kevin, MD  10/22/23 9419

## 2023-10-22 NOTE — ED TRIAGE NOTES
Fell three days ago, cut head, put new skin on cut. Right buttock and leg bruising. Pt is on eliquis, no LOC.

## 2023-10-22 NOTE — DISCHARGE INSTRUCTIONS
Return to the emergency department if symptoms are worsening, become concerning, or for any other concerns. Follow-up with your scheduled doctor appointment this coming Wednesday.  Hold your anticoagulation untill that time.

## 2024-03-07 ENCOUNTER — APPOINTMENT (OUTPATIENT)
Dept: ULTRASOUND IMAGING | Facility: CLINIC | Age: 89
End: 2024-03-07
Attending: NURSE PRACTITIONER
Payer: COMMERCIAL

## 2024-03-07 ENCOUNTER — APPOINTMENT (OUTPATIENT)
Dept: CT IMAGING | Facility: CLINIC | Age: 89
End: 2024-03-07
Attending: EMERGENCY MEDICINE
Payer: COMMERCIAL

## 2024-03-07 ENCOUNTER — APPOINTMENT (OUTPATIENT)
Dept: CT IMAGING | Facility: CLINIC | Age: 89
End: 2024-03-07
Attending: NURSE PRACTITIONER
Payer: COMMERCIAL

## 2024-03-07 ENCOUNTER — APPOINTMENT (OUTPATIENT)
Dept: MRI IMAGING | Facility: CLINIC | Age: 89
End: 2024-03-07
Attending: NURSE PRACTITIONER
Payer: COMMERCIAL

## 2024-03-07 ENCOUNTER — APPOINTMENT (OUTPATIENT)
Dept: SPEECH THERAPY | Facility: CLINIC | Age: 89
End: 2024-03-07
Attending: HOSPITALIST
Payer: COMMERCIAL

## 2024-03-07 ENCOUNTER — APPOINTMENT (OUTPATIENT)
Dept: PHYSICAL THERAPY | Facility: CLINIC | Age: 89
End: 2024-03-07
Attending: HOSPITALIST
Payer: COMMERCIAL

## 2024-03-07 ENCOUNTER — HOSPITAL ENCOUNTER (OUTPATIENT)
Facility: CLINIC | Age: 89
Setting detail: OBSERVATION
Discharge: HOME | End: 2024-03-10
Attending: EMERGENCY MEDICINE | Admitting: HOSPITALIST
Payer: COMMERCIAL

## 2024-03-07 DIAGNOSIS — I42.9 CARDIOMYOPATHY, UNSPECIFIED TYPE (H): Primary | ICD-10-CM

## 2024-03-07 DIAGNOSIS — Z79.01 ANTICOAGULATED: ICD-10-CM

## 2024-03-07 DIAGNOSIS — I63.9 CEREBROVASCULAR ACCIDENT (CVA), UNSPECIFIED MECHANISM (H): ICD-10-CM

## 2024-03-07 DIAGNOSIS — Z86.73 HISTORY OF STROKE: ICD-10-CM

## 2024-03-07 DIAGNOSIS — R79.89 ELEVATED TROPONIN: ICD-10-CM

## 2024-03-07 DIAGNOSIS — I63.9 ACUTE ISCHEMIC STROKE (H): ICD-10-CM

## 2024-03-07 LAB
ALBUMIN UR-MCNC: NEGATIVE MG/DL
ANION GAP SERPL CALCULATED.3IONS-SCNC: 10 MMOL/L (ref 7–15)
APPEARANCE UR: CLEAR
APTT PPP: 41 SECONDS (ref 22–38)
ATRIAL RATE - MUSE: 65 BPM
BASOPHILS # BLD AUTO: 0 10E3/UL (ref 0–0.2)
BASOPHILS NFR BLD AUTO: 1 %
BILIRUB UR QL STRIP: NEGATIVE
BUN SERPL-MCNC: 19.5 MG/DL (ref 8–23)
CALCIUM SERPL-MCNC: 8.6 MG/DL (ref 8.2–9.6)
CHLORIDE SERPL-SCNC: 104 MMOL/L (ref 98–107)
CHOLEST SERPL-MCNC: 90 MG/DL
COLOR UR AUTO: NORMAL
CREAT SERPL-MCNC: 1.05 MG/DL (ref 0.67–1.17)
DEPRECATED HCO3 PLAS-SCNC: 26 MMOL/L (ref 22–29)
DIASTOLIC BLOOD PRESSURE - MUSE: NORMAL MMHG
EGFRCR SERPLBLD CKD-EPI 2021: 67 ML/MIN/1.73M2
EOSINOPHIL # BLD AUTO: 0.2 10E3/UL (ref 0–0.7)
EOSINOPHIL NFR BLD AUTO: 3 %
ERYTHROCYTE [DISTWIDTH] IN BLOOD BY AUTOMATED COUNT: 13.1 % (ref 10–15)
GLUCOSE BLDC GLUCOMTR-MCNC: 150 MG/DL (ref 70–99)
GLUCOSE BLDC GLUCOMTR-MCNC: 233 MG/DL (ref 70–99)
GLUCOSE SERPL-MCNC: 132 MG/DL (ref 70–99)
GLUCOSE UR STRIP-MCNC: NEGATIVE MG/DL
HBA1C MFR BLD: 6.8 %
HCT VFR BLD AUTO: 37.4 % (ref 40–53)
HDLC SERPL-MCNC: 35 MG/DL
HGB BLD-MCNC: 12.7 G/DL (ref 13.3–17.7)
HGB UR QL STRIP: NEGATIVE
IMM GRANULOCYTES # BLD: 0 10E3/UL
IMM GRANULOCYTES NFR BLD: 0 %
INR PPP: 1.35 (ref 0.85–1.15)
INTERPRETATION ECG - MUSE: NORMAL
KETONES UR STRIP-MCNC: NEGATIVE MG/DL
LDLC SERPL CALC-MCNC: 41 MG/DL
LEUKOCYTE ESTERASE UR QL STRIP: NEGATIVE
LYMPHOCYTES # BLD AUTO: 3 10E3/UL (ref 0.8–5.3)
LYMPHOCYTES NFR BLD AUTO: 44 %
MCH RBC QN AUTO: 31 PG (ref 26.5–33)
MCHC RBC AUTO-ENTMCNC: 34 G/DL (ref 31.5–36.5)
MCV RBC AUTO: 91 FL (ref 78–100)
MONOCYTES # BLD AUTO: 0.6 10E3/UL (ref 0–1.3)
MONOCYTES NFR BLD AUTO: 8 %
NEUTROPHILS # BLD AUTO: 2.9 10E3/UL (ref 1.6–8.3)
NEUTROPHILS NFR BLD AUTO: 44 %
NITRATE UR QL: NEGATIVE
NONHDLC SERPL-MCNC: 55 MG/DL
NRBC # BLD AUTO: 0 10E3/UL
NRBC BLD AUTO-RTO: 0 /100
P AXIS - MUSE: 10 DEGREES
PH UR STRIP: 6.5 [PH] (ref 5–7)
PLATELET # BLD AUTO: 157 10E3/UL (ref 150–450)
POTASSIUM SERPL-SCNC: 4.4 MMOL/L (ref 3.4–5.3)
PR INTERVAL - MUSE: 186 MS
QRS DURATION - MUSE: 160 MS
QT - MUSE: 442 MS
QTC - MUSE: 459 MS
R AXIS - MUSE: -60 DEGREES
RBC # BLD AUTO: 4.1 10E6/UL (ref 4.4–5.9)
RBC URINE: <1 /HPF
SODIUM SERPL-SCNC: 140 MMOL/L (ref 135–145)
SP GR UR STRIP: 1.01 (ref 1–1.03)
SYSTOLIC BLOOD PRESSURE - MUSE: NORMAL MMHG
T AXIS - MUSE: -7 DEGREES
TRIGL SERPL-MCNC: 70 MG/DL
TROPONIN T SERPL HS-MCNC: 23 NG/L
TROPONIN T SERPL HS-MCNC: 23 NG/L
TROPONIN T SERPL HS-MCNC: 24 NG/L
UROBILINOGEN UR STRIP-MCNC: NORMAL MG/DL
VENTRICULAR RATE- MUSE: 65 BPM
WBC # BLD AUTO: 6.7 10E3/UL (ref 4–11)
WBC URINE: <1 /HPF

## 2024-03-07 PROCEDURE — 250N000011 HC RX IP 250 OP 636: Performed by: HOSPITALIST

## 2024-03-07 PROCEDURE — 84484 ASSAY OF TROPONIN QUANT: CPT | Performed by: HOSPITALIST

## 2024-03-07 PROCEDURE — 250N000011 HC RX IP 250 OP 636: Performed by: EMERGENCY MEDICINE

## 2024-03-07 PROCEDURE — 99291 CRITICAL CARE FIRST HOUR: CPT | Mod: 25

## 2024-03-07 PROCEDURE — 36415 COLL VENOUS BLD VENIPUNCTURE: CPT | Performed by: HOSPITALIST

## 2024-03-07 PROCEDURE — 96374 THER/PROPH/DIAG INJ IV PUSH: CPT | Mod: 59

## 2024-03-07 PROCEDURE — G0378 HOSPITAL OBSERVATION PER HR: HCPCS

## 2024-03-07 PROCEDURE — 82465 ASSAY BLD/SERUM CHOLESTEROL: CPT | Performed by: HOSPITALIST

## 2024-03-07 PROCEDURE — 85610 PROTHROMBIN TIME: CPT | Performed by: EMERGENCY MEDICINE

## 2024-03-07 PROCEDURE — 83036 HEMOGLOBIN GLYCOSYLATED A1C: CPT | Performed by: HOSPITALIST

## 2024-03-07 PROCEDURE — 85025 COMPLETE CBC W/AUTO DIFF WBC: CPT | Performed by: EMERGENCY MEDICINE

## 2024-03-07 PROCEDURE — 92610 EVALUATE SWALLOWING FUNCTION: CPT | Mod: GN | Performed by: SPEECH-LANGUAGE PATHOLOGIST

## 2024-03-07 PROCEDURE — 250N000013 HC RX MED GY IP 250 OP 250 PS 637: Performed by: NURSE PRACTITIONER

## 2024-03-07 PROCEDURE — 92526 ORAL FUNCTION THERAPY: CPT | Mod: GN | Performed by: SPEECH-LANGUAGE PATHOLOGIST

## 2024-03-07 PROCEDURE — 97116 GAIT TRAINING THERAPY: CPT | Mod: GP

## 2024-03-07 PROCEDURE — 82962 GLUCOSE BLOOD TEST: CPT

## 2024-03-07 PROCEDURE — 84484 ASSAY OF TROPONIN QUANT: CPT | Performed by: EMERGENCY MEDICINE

## 2024-03-07 PROCEDURE — 99291 CRITICAL CARE FIRST HOUR: CPT | Mod: FS | Performed by: NURSE PRACTITIONER

## 2024-03-07 PROCEDURE — 250N000013 HC RX MED GY IP 250 OP 250 PS 637: Performed by: HOSPITALIST

## 2024-03-07 PROCEDURE — 70450 CT HEAD/BRAIN W/O DYE: CPT | Mod: XU

## 2024-03-07 PROCEDURE — 255N000002 HC RX 255 OP 636: Performed by: EMERGENCY MEDICINE

## 2024-03-07 PROCEDURE — 99207 PR APP CREDIT; MD BILLING SHARED VISIT: CPT | Performed by: NURSE PRACTITIONER

## 2024-03-07 PROCEDURE — 70553 MRI BRAIN STEM W/O & W/DYE: CPT

## 2024-03-07 PROCEDURE — 97161 PT EVAL LOW COMPLEX 20 MIN: CPT | Mod: GP

## 2024-03-07 PROCEDURE — 81001 URINALYSIS AUTO W/SCOPE: CPT | Performed by: EMERGENCY MEDICINE

## 2024-03-07 PROCEDURE — 36415 COLL VENOUS BLD VENIPUNCTURE: CPT | Performed by: EMERGENCY MEDICINE

## 2024-03-07 PROCEDURE — 71260 CT THORAX DX C+: CPT

## 2024-03-07 PROCEDURE — 93005 ELECTROCARDIOGRAM TRACING: CPT

## 2024-03-07 PROCEDURE — 80048 BASIC METABOLIC PNL TOTAL CA: CPT | Performed by: EMERGENCY MEDICINE

## 2024-03-07 PROCEDURE — 99222 1ST HOSP IP/OBS MODERATE 55: CPT | Performed by: HOSPITALIST

## 2024-03-07 PROCEDURE — 93880 EXTRACRANIAL BILAT STUDY: CPT

## 2024-03-07 PROCEDURE — 0042T CT HEAD PERFUSION W CONTRAST: CPT

## 2024-03-07 PROCEDURE — 85730 THROMBOPLASTIN TIME PARTIAL: CPT | Performed by: EMERGENCY MEDICINE

## 2024-03-07 PROCEDURE — A9585 GADOBUTROL INJECTION: HCPCS | Performed by: EMERGENCY MEDICINE

## 2024-03-07 PROCEDURE — 70496 CT ANGIOGRAPHY HEAD: CPT

## 2024-03-07 PROCEDURE — 250N000009 HC RX 250: Performed by: HOSPITALIST

## 2024-03-07 PROCEDURE — 250N000009 HC RX 250: Performed by: EMERGENCY MEDICINE

## 2024-03-07 RX ORDER — METOPROLOL SUCCINATE 25 MG/1
25 TABLET, EXTENDED RELEASE ORAL DAILY
COMMUNITY

## 2024-03-07 RX ORDER — TAMSULOSIN HYDROCHLORIDE 0.4 MG/1
0.8 CAPSULE ORAL EVERY EVENING
Status: DISCONTINUED | OUTPATIENT
Start: 2024-03-07 | End: 2024-03-10 | Stop reason: HOSPADM

## 2024-03-07 RX ORDER — ATORVASTATIN CALCIUM 10 MG/1
10 TABLET, FILM COATED ORAL DAILY
Status: DISCONTINUED | OUTPATIENT
Start: 2024-03-07 | End: 2024-03-10 | Stop reason: HOSPADM

## 2024-03-07 RX ORDER — IOPAMIDOL 755 MG/ML
84 INJECTION, SOLUTION INTRAVASCULAR ONCE
Status: COMPLETED | OUTPATIENT
Start: 2024-03-07 | End: 2024-03-07

## 2024-03-07 RX ORDER — QUETIAPINE FUMARATE 50 MG/1
50 TABLET, FILM COATED ORAL AT BEDTIME
Status: DISCONTINUED | OUTPATIENT
Start: 2024-03-07 | End: 2024-03-07

## 2024-03-07 RX ORDER — ASPIRIN 81 MG/1
81 TABLET, CHEWABLE ORAL DAILY
Status: DISCONTINUED | OUTPATIENT
Start: 2024-03-07 | End: 2024-03-10 | Stop reason: HOSPADM

## 2024-03-07 RX ORDER — ATORVASTATIN CALCIUM 10 MG/1
10 TABLET, FILM COATED ORAL AT BEDTIME
COMMUNITY

## 2024-03-07 RX ORDER — GADOBUTROL 604.72 MG/ML
8 INJECTION INTRAVENOUS ONCE
Status: COMPLETED | OUTPATIENT
Start: 2024-03-07 | End: 2024-03-07

## 2024-03-07 RX ORDER — ACETAMINOPHEN 500 MG
1000 TABLET ORAL 3 TIMES DAILY PRN
Status: DISCONTINUED | OUTPATIENT
Start: 2024-03-07 | End: 2024-03-07

## 2024-03-07 RX ORDER — IOPAMIDOL 755 MG/ML
117 INJECTION, SOLUTION INTRAVASCULAR ONCE
Status: COMPLETED | OUTPATIENT
Start: 2024-03-07 | End: 2024-03-07

## 2024-03-07 RX ADMIN — IOPAMIDOL 117 ML: 755 INJECTION, SOLUTION INTRAVENOUS at 07:56

## 2024-03-07 RX ADMIN — ATORVASTATIN CALCIUM 10 MG: 10 TABLET, FILM COATED ORAL at 20:21

## 2024-03-07 RX ADMIN — GADOBUTROL 8 ML: 604.72 INJECTION INTRAVENOUS at 11:04

## 2024-03-07 RX ADMIN — TAMSULOSIN HYDROCHLORIDE 0.8 MG: 0.4 CAPSULE ORAL at 20:20

## 2024-03-07 RX ADMIN — ASPIRIN 81 MG CHEWABLE TABLET 81 MG: 81 TABLET CHEWABLE at 13:05

## 2024-03-07 RX ADMIN — SODIUM CHLORIDE 100 ML: 9 INJECTION, SOLUTION INTRAVENOUS at 07:56

## 2024-03-07 RX ADMIN — MIDAZOLAM 0.5 MG: 1 INJECTION INTRAMUSCULAR; INTRAVENOUS at 10:15

## 2024-03-07 RX ADMIN — APIXABAN 5 MG: 5 TABLET, FILM COATED ORAL at 20:20

## 2024-03-07 RX ADMIN — SODIUM CHLORIDE 66 ML: 9 INJECTION, SOLUTION INTRAVENOUS at 17:57

## 2024-03-07 RX ADMIN — IOPAMIDOL 84 ML: 755 INJECTION, SOLUTION INTRAVENOUS at 17:57

## 2024-03-07 ASSESSMENT — ACTIVITIES OF DAILY LIVING (ADL)
ADLS_ACUITY_SCORE: 39
ADLS_ACUITY_SCORE: 42
ADLS_ACUITY_SCORE: 39
ADLS_ACUITY_SCORE: 42
ADLS_ACUITY_SCORE: 39
ADLS_ACUITY_SCORE: 44
ADLS_ACUITY_SCORE: 44
ADLS_ACUITY_SCORE: 39
ADLS_ACUITY_SCORE: 44
ADLS_ACUITY_SCORE: 39
ADLS_ACUITY_SCORE: 42
ADLS_ACUITY_SCORE: 42
ADLS_ACUITY_SCORE: 39
ADLS_ACUITY_SCORE: 44

## 2024-03-07 ASSESSMENT — COLUMBIA-SUICIDE SEVERITY RATING SCALE - C-SSRS
6. HAVE YOU EVER DONE ANYTHING, STARTED TO DO ANYTHING, OR PREPARED TO DO ANYTHING TO END YOUR LIFE?: NO
2. HAVE YOU ACTUALLY HAD ANY THOUGHTS OF KILLING YOURSELF IN THE PAST MONTH?: NO
1. IN THE PAST MONTH, HAVE YOU WISHED YOU WERE DEAD OR WISHED YOU COULD GO TO SLEEP AND NOT WAKE UP?: NO

## 2024-03-07 NOTE — PLAN OF CARE
Goal Outcome Evaluation:      Plan of Care Reviewed With: patient    Overall Patient Progress: no changeOverall Patient Progress: no change         Reason for Admission: stroke    Cognitive/Mentation: A/Ox 4  Neuros/CMS: Intact ex mild word finding difficultis & mild slurred/thick SLP  VS: HTN, on RA.   Tele: SR BBB.  GI: Last BM 3/6/24. Continent.  : Voiding. Continent.  Pulmonary: LS clear.  Pain: denies.     Drains/Lines: PIV  Skin: blanchable redness on coccyx  Activity: Assist x 1 with GB & walker.  Diet: Regular with thin liquids. Takes pills whole.     Therapies recs: OP SLP/PT  Discharge: pending workup    Aggression Stoplight Tool: green    End of shift summary: CT C/A/P done.

## 2024-03-07 NOTE — CONSULTS
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 He presented to the ED today with speech difficulty. His last known well was last night at bedtime. He woke up feeling like his normal self and went to make coffee; he spoke to his wife at that time who noticed that his speech was slurred (this was around 0650). He notes that he's had some congestion and intermittent diarrhea for the last week or so. He hasn't missed any doses of apixaban lately; last dose was last night.   He lives with his wife in an apartment. In addition to some residual dysarthria, he notes some baseline right sided weakness since his stroke. He uses a walker.     Imaging Findings  CUS  1. Less than 50% diameter stenosis of the right ICA relative to the distal ICA diameter.   2. Less than 50% diameter stenosis of the left ICA relative to the distal ICA diameter.     MRI brain w/ and w/out contrast  1. Recent tiny ischemic infarcts in the left caudate nucleus and left frontal lobe.  2. Probable late subacute ischemic infarct in the superior aspect of the right cerebral hemisphere again noted.  3. Diffuse cerebral volume loss and cerebral white matter changes consistent with chronic small vessel ischemic disease.     CT perfusion   No findings to suggest an acute infarct or penumbra     CTA Head/Neck  1. No acute large vessel occlusion throughout the [place] arteries.  2. No significant stenosis throughout the major neck arteries. No dissection.  3. 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  4. 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    CT Head  No evidence of an  "acute territorial infarction    Intravenous Thrombolysis  Not given due to:   - minor/isolated/quickly resolving symptoms  - DOAC dose within 48 hours or INR > 1.7  - unclear or unfavorable risk-benefit profile for extended window thrombolysis beyond the conventional 4.5 hour time window    Endovascular Treatment  Not initiated due to absence of proximal vessel occlusion    Impression   Acute left caudate nucleus and left frontal lobe ischemic infarcts   Late subacute superior right cerebral hemisphere infarct   Chronic right cerebellar infarct     Recommendations  - Use orderset: \"Ischemic Stroke Routine Admission\" or \"Ischemic Stroke No Thrombolytics/No Thrombectomy ICU Admission\"  - Neurochecks and Vital Signs every 4 hours    - Permissive HTN; goal SBP < 220 mmHg  - continue PTA apixaban  - add aspirin 81 mg daily x 90 days and then stop   - continue atorvastatin 10 mg daily   - TTE (with Bubble Study if age 60 yrs or less)  - Telemetry  - Bedside Glucose Monitoring  - Nutrition: per SLP   - A1c, Lipid Panel, Troponin x 3  - PT/OT/SLP  - Stroke Education  - Depression Screen  - Apnea screening questions  - Euthermia, Euglycemia  - CT C/A/P to r/o malignancy     Patient Follow-up     - final recommendation pending work-up    Thank you for this consult. We will continue to follow.      Lia Olivia NP  Vascular Neurology    To page me or covering stroke neurology team member, click here: [facility]  Choose \"On Call\" tab at top, then select \"NEUROLOGY/ALL SITES\" from middle drop-down box, press Enter, then look for \"stroke\" or \"telestroke\" for your site.  ______________________________________________________    Clinically Significant Risk Factors Present on Admission               # Drug Induced Coagulation Defect: home medication list includes an anticoagulant medication        # DMII: A1C = 6.8 % (Ref range: <5.7 %) within past 6 months               Past Medical History   Past Medical History:   Diagnosis Date "    A-fib (H)     BPH (benign prostatic hyperplasia)     CAD (coronary artery disease)     DMII (diabetes mellitus, type 2) (H)     TIA (transient ischemic attack)      Past Surgical History   Past Surgical History:   Procedure Laterality Date    BACK SURGERY      1990s    TONGUE SURGERY      due to precancerous lesion     Medications   Home Meds  Prior to Admission medications    Medication Sig Start Date End Date Taking? Authorizing Provider   acetaminophen (TYLENOL) 500 MG tablet Take 2 tablets (1,000 mg) by mouth 3 times daily as needed for mild pain 9/15/22   Boo Senior, APRN CNP   apixaban ANTICOAGULANT (ELIQUIS) 5 MG tablet Take 5 mg by mouth 2 times daily    Unknown, Entered By History   atorvastatin (LIPITOR) 20 MG tablet Take 10 mg by mouth daily    Unknown, Entered By History   loratadine (CLARITIN) 10 MG tablet Take 10 mg by mouth daily    Unknown, Entered By History   melatonin 1 MG TABS tablet Take 1 tablet (1 mg) by mouth every evening 8/7/22   Kermit Webster MD   metFORMIN (GLUCOPHAGE XR) 500 MG 24 hr tablet Take 500 mg by mouth daily (with breakfast)    Unknown, Entered By History   metoprolol succinate ER (TOPROL XL) 50 MG 24 hr tablet Take 25 mg by mouth daily    Unknown, Entered By History   QUEtiapine (SEROQUEL) 50 MG tablet Take 1 tablet (50 mg) by mouth At Bedtime 8/30/22   Lindsay Lou MD   senna-docusate (SENOKOT-S/PERICOLACE) 8.6-50 MG tablet Take 1 tablet by mouth 2 times daily as needed for constipation 8/30/22   Lindsay Lou MD   tamsulosin (FLOMAX) 0.4 MG capsule Take 0.4 mg by mouth daily    Unknown, Entered By History   lisinopril-hydrochlorothiazide (ZESTORETIC) 20-25 MG tablet Take 1 tablet by mouth daily  8/7/22  Unknown, Entered By History       Scheduled Meds   apixaban ANTICOAGULANT  5 mg Oral BID    aspirin  81 mg Oral Daily    atorvastatin  10 mg Oral Daily    tamsulosin  0.8 mg Oral QPM       Infusion Meds   - MEDICATION INSTRUCTIONS -         PRN Meds  -  MEDICATION INSTRUCTIONS -    Allergies   No Known Allergies  Family History   No family history on file.  Social History   Social History     Tobacco Use    Smoking status: Former     Types: Cigars, Pipe     Quit date:      Years since quittin.2   Substance Use Topics    Alcohol use: Never       Review of Systems   The 10 point Review of Systems is negative other than noted in the HPI or here.        PHYSICAL EXAMINATION  Temp:  [97.7  F (36.5  C)-98.2  F (36.8  C)] 97.7  F (36.5  C)  Pulse:  [58-84] 62  Resp:  [16-28] 16  BP: (168-182)/(75-85) 182/85  SpO2:  [96 %-98 %] 98 %     General Exam  General:  patient lying in bed without any acute distress    HEENT:  normocephalic/atraumatic  Cardio:  RRR  Pulmonary:  no respiratory distress    Neuro Exam  Mental Status:  alert, oriented x 3, follows commands, speech dysarthric, decreased fluency, naming and repetition normal  Cranial Nerves:  visual fields intact, PERRL, EOMI with normal smooth pursuit, facial sensation intact and symmetric, facial movements symmetric, hearing not formally tested but intact to conversation, mild-moderate dysarthria, shoulder shrug strong bilaterally, tongue protrusion midline  Motor:  normal muscle tone and bulk, no abnormal movements, able to move all limbs spontaneously, no pronator drift, mildly decreased strength in RUE and RLE   Reflexes:  toes down-going  Sensory:  light touch sensation intact and symmetric throughout upper and lower extremities, no extinction on double simultaneous stimulation   Coordination:  normal finger-to-nose and heel-to-shin bilaterally without dysmetria  Station/Gait:  deferred    Dysphagia Screen  Dysarthria or facial droop present - Maintain NPO, consult SLP    Stroke Scales    NIHSS  1a. Level of Consciousness 0-->Alert, keenly responsive   1b. LOC Questions 0-->Answers both questions correctly   1c. LOC Commands 0-->Performs both tasks correctly   2.   Best Gaze 0-->Normal   3.   Visual 0-->No  "visual loss   4.   Facial Palsy 0-->Normal symmetrical movements   5a. Motor Arm, Left 0-->No drift, limb holds 90 (or 45) degrees for full 10 secs   5b. Motor Arm, Right 0-->No drift, limb holds 90 (or 45) degrees for full 10 secs   6a. Motor Leg, Left 0-->No drift, leg holds 30 degree position for full 5 secs   6b. Motor Leg, right 0-->No drift, leg holds 30 degree position for full 5 secs   7.   Limb Ataxia 0-->Absent   8.   Sensory 0-->Normal, no sensory loss   9.   Best Language 1-->Mild-to-moderate aphasia, some obvious loss of fluency or facility of comprehension, without significant limitation on ideas expressed or form of expression. Reduction of speech and/or comprehension, however, makes conversation. . . (see row details)   10. Dysarthria 1-->Mild-to-moderate dysarthria, patient slurs at least some words and, at worst, can be understood with some difficulty   11. Extinction and Inattention  0-->No abnormality   Total 2 (03/07/24 0800)       Modified Evy Score (Pre-morbid)  2 - Slight disability.  Able to look after own affairs without assistance, but unable to carry out all previous activities.    Imaging  I personally reviewed all imaging; relevant findings per HPI.     Lab Results Data   CBC  Recent Labs   Lab 03/07/24  0741   WBC 6.7   RBC 4.10*   HGB 12.7*   HCT 37.4*        Basic Metabolic Panel    Recent Labs   Lab 03/07/24  0741      POTASSIUM 4.4   CHLORIDE 104   CO2 26   BUN 19.5   CR 1.05   *   MAURICIO 8.6     Liver Panel  No results for input(s): \"PROTTOTAL\", \"ALBUMIN\", \"BILITOTAL\", \"ALKPHOS\", \"AST\", \"ALT\", \"BILIDIRECT\" in the last 168 hours.  INR    Recent Labs   Lab Test 03/07/24  0741 08/01/22  0939   INR 1.35* 1.37*      Lipid Profile    Recent Labs   Lab Test 08/02/22  0747 08/01/22  1430   CHOL 94 100   HDL 36* 38*   LDL 40 40   TRIG 91 110     A1C    Recent Labs   Lab Test 03/07/24  1404 08/01/22  1430 08/01/22  0939   A1C 6.8* 7.1* 7.3*     Troponin    Recent Labs "   Lab 03/07/24  1404 03/07/24  0953 03/07/24  0741   CTROPT 23* 23* 24*          Stroke Code Data Data   Stroke Code Data  (for stroke code without tele)  Stroke code activated 03/07/24  0741   First stroke provider response 03/07/24  0743   Last known normal 03/06/24      Time of discovery (or onset of symptoms) 03/07/24  0650   Head CT read by Stroke Neuro Provider 03/07/24  0802   Was stroke code de-escalated? Yes  03/07/24  0814       I personally examined and evaluated the patient today. At the time of my evaluation and management the patient was in critical condition today due to acute speech changes concerning for stroke. I personally managed review of labs/images, neuro exam. Key decisions made today included: admit for stroke work up. I spent a total of 45 minutes providing critical care services, evaluating the patient, directing care and reviewing laboratory values and radiologic reports.

## 2024-03-07 NOTE — ED NOTES
Bed: ST03  Expected date:   Expected time:   Means of arrival:   Comments:  523  89 M lkw 0650/slurred speech  3158

## 2024-03-07 NOTE — H&P
[facility]    History and Physical  Hospitalist       Date of Admission:  3/7/2024    Assessment & Plan   Nathan Macias is a 89 year old male with a history of atrial fibrillation on chronic anticoagulation with apixaban, type 2 diabetes, BPH, hyperlipidemia who presents via EMS with complaints of significant speech difficulties.  Patient felt fine when he went to bed last night but started noticing difficulty with his speech when he woke up this morning.  He had trouble getting his words out.  Denies any recent head trauma, fever or chills or any infectious etiology.  Denies any chest pain or lightheadedness.  His wife did report that he had significant aphasia.  There was no difficulty with his gait.  His wife noted slurring of his words at 650 this morning.  EMS was called and he was found to be hypertensive with systolic blood pressures in the 170s and he was brought to the hospital where code stroke was called.  By the time I evaluated the patient his symptoms were almost completely resolved.  He continues to have some mild expressive aphasia with word finding difficulties.    #1 left-sided acute ischemic stroke in the left caudate nucleus and left frontal lobe with expressive aphasia  Previous history of right cerebellar stroke  -Concern for possible ischemic stroke.  -Code stroke was called and neurostroke team evaluated patient.  -CTA head and neck was done which showed no large vessel occlusion, no significant stenosis throughout major neck arteries.  No dissection.  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  -CT head without contrast showed no acute stroke.  -MRI brain with and without contrast showed recent tiny ischemic infarct in the left caudate nucleus and left frontal lobe.  -Patient is being admitted under observation to complete stroke workup.  -Started on aspirin 81 daily.  -Continue home DOAC apixaban 5 mg  twice daily.  -Continue atorvastatin 10 mg daily.  Lipid panel ordered and pending.  -Allow for permissive hypertension up to .  Holding home antihypertensives for now.  -PT/OT/speech consulted.  -Echocardiogram ordered.  -Continue cardiac monitoring.  -Stroke neurology team consulted.  -Carotid ultrasound ordered and pending.    2.  Type 2 diabetes mellitus  Hyperlipidemia  -metformin 500 mg daily at home.    -HbA1c ordered.  -Ordered for carb controlled diet once he passes bedside swallow evaluation.  -Sliding scale insulin aspart.  Holding metformin.  -Lipid panel ordered and pending    3.  BPH with history of urinary retention-on tamsulosin 0.8 mg daily    4.  Paroxysmal atrial fibrillation-patient is currently in sinus rhythm.  On apixaban.  Continue with cardiac monitoring.  Holding metoprolol to allow for permissive hypertension.    #5 history of hospital induced delirium-  -was treated with Seroquel in the past.  Not currently on it.  -High risk for delirium while in hospital.  Maintain wake sleep cycles.    Greater than 35 minutes spent on reviewing the chart, reviewing previous hospitalization records, labs and imaging and documentation.    Clinically Significant Risk Factors Present on Admission               # Drug Induced Coagulation Defect: home medication list includes an anticoagulant medication                     DVT Prophylaxis: DOAC  Code Status: Full Code    Disposition: Expected discharge when medically stable    Lindsay Lou MD, MD  [phone] (p)  [phone] (c)    Primary Care Physician   [facility]    Chief Complaint   Speech finding difficulties    History is obtained from the patient and review of medical records.    History of Present Illness   Nathan Macias is a 89 year old male with a history of atrial fibrillation on chronic anticoagulation with apixaban, type 2 diabetes, BPH, hyperlipidemia who presents via EMS with complaints of  significant speech difficulties.  Patient felt fine when he went to bed last night but started noticing difficulty with his speech when he woke up this morning.  He had trouble getting his words out.  Denies any recent head trauma, fever or chills or any infectious etiology.  Denies any chest pain or lightheadedness.  His wife did report that he had significant aphasia.  There was no difficulty with his gait.  His wife noted slurring of his words at 6:50 this morning.  EMS was called and he was found to be hypertensive with systolic blood pressures in the 170s and he was brought to the hospital where code stroke was called.  By the time I evaluated the patient his symptoms were almost completely resolved.  He continues to have some mild expressive aphasia with word finding difficulties.    In the ER basic workup including CT head, CT angio head and neck were negative for acute stroke or acute vascular abnormalities.  MRI brain with and without contrast did show an acute ischemic stroke in the left frontal lobe and left caudate nucleus.  At this point stroke neurology recommended admitting patient to allow for permissive hypertension and completing workup.  There is no indication for thrombolytics due to the mild nature of his symptoms and ongoing improvement with his speech difficulties.  He denied missing any of his recent apixaban doses.    Patient has had previous strokes with right cerebellar stroke and aphasia with ataxia and delirium in 2022 for which he had a month-long hospital stay but has since recovered from this.  Has history of paroxysmal atrial fibrillation and is on chronic anticoagulation with apixaban.  Patient is a former smoker but quit 30 years ago.    Past Medical History    I have reviewed this patient's medical history and updated it with pertinent information if needed.   Past Medical History:   Diagnosis Date    A-fib (H)     BPH (benign prostatic hyperplasia)     CAD (coronary artery  disease)     DMII (diabetes mellitus, type 2) (H)     TIA (transient ischemic attack)      Past Surgical History   I have reviewed this patient's surgical history and updated it with pertinent information if needed.  Past Surgical History:   Procedure Laterality Date    BACK SURGERY      1990s    TONGUE SURGERY      due to precancerous lesion     Prior to Admission Medications   Prior to Admission Medications   Prescriptions Last Dose Informant Patient Reported? Taking?   QUEtiapine (SEROQUEL) 50 MG tablet   No No   Sig: Take 1 tablet (50 mg) by mouth At Bedtime   acetaminophen (TYLENOL) 500 MG tablet   Yes No   Sig: Take 2 tablets (1,000 mg) by mouth 3 times daily as needed for mild pain   apixaban ANTICOAGULANT (ELIQUIS) 5 MG tablet   Yes No   Sig: Take 5 mg by mouth 2 times daily   atorvastatin (LIPITOR) 20 MG tablet   Yes No   Sig: Take 10 mg by mouth daily   loratadine (CLARITIN) 10 MG tablet   Yes No   Sig: Take 10 mg by mouth daily   melatonin 1 MG TABS tablet   No No   Sig: Take 1 tablet (1 mg) by mouth every evening   metFORMIN (GLUCOPHAGE XR) 500 MG 24 hr tablet   Yes No   Sig: Take 500 mg by mouth daily (with breakfast)   metoprolol succinate ER (TOPROL XL) 50 MG 24 hr tablet   Yes No   Sig: Take 25 mg by mouth daily   senna-docusate (SENOKOT-S/PERICOLACE) 8.6-50 MG tablet   No No   Sig: Take 1 tablet by mouth 2 times daily as needed for constipation   tamsulosin (FLOMAX) 0.4 MG capsule   Yes No   Sig: Take 0.4 mg by mouth daily      Facility-Administered Medications: None     Allergies   No Known Allergies  Social History   I have reviewed this patient's social history and updated it with pertinent information if needed.   Nathan  reports that he quit smoking about 30 years ago. His smoking use included cigars and pipe. He does not have any smokeless tobacco history on file. He reports that he does not drink alcohol. He     Family History   I have reviewed this patient's family history and updated it  with pertinent information if needed.    No data available          Review of Systems   The 10 point Review of Systems is negative other than noted in the HPI or here.     Physical Exam   Temp: 98.2  F (36.8  C) Temp src: Temporal BP: (!) 173/75 Pulse: 84   Resp: 28 SpO2: 98 % O2 Device: None (Room air)    Vital Signs with Ranges  Temp:  [98.2  F (36.8  C)] 98.2  F (36.8  C)  Pulse:  [84] 84  Resp:  [18-28] 28  BP: (173)/(75) 173/75  SpO2:  [96 %-98 %] 98 %  167 lbs 12.32 oz    Physical Exam  Constitutional:       Comments: Old and frail   Cardiovascular:      Rate and Rhythm: Normal rate and regular rhythm.      Pulses: Normal pulses.      Heart sounds: Normal heart sounds.   Pulmonary:      Effort: Pulmonary effort is normal. No respiratory distress.      Breath sounds: Normal breath sounds.   Abdominal:      General: Abdomen is flat. Bowel sounds are normal. There is no distension.      Tenderness: There is no abdominal tenderness. There is no guarding.   Skin:     General: Skin is warm and dry.   Neurological:      General: No focal deficit present.      Comments: Word finding difficulties         Data   Data reviewed today:  I personally reviewed the EKG tracing showing sinus rhythm with right bundle branch block with left anterior fascicular block  .  Carotid ultrasound does not show significant stenosis    Recent Labs   Lab 03/07/24  0741   WBC 6.7   HGB 12.7*   MCV 91      INR 1.35*      POTASSIUM 4.4   CHLORIDE 104   CO2 26   BUN 19.5   CR 1.05   ANIONGAP 10   MAURICIO 8.6   *       Recent Results (from the past 24 hour(s))   CT Head w/o Contrast    Narrative    CT SCAN OF THE HEAD WITHOUT CONTRAST   3/7/2024 7:52 AM     HISTORY: Code Stroke to evaluate for potential thrombolysis and  thrombectomy. Slurred speech.    TECHNIQUE:  Axial images of the head and coronal reformations without  IV contrast material. Radiation dose for this scan was reduced using  automated exposure control,  adjustment of the mA and/or kV according  to patient size, or iterative reconstruction technique.    COMPARISON: 10/22/2023.    FINDINGS: There is no evidence of intracranial hemorrhage, mass, acute  territorial infarct or anomaly. Chronic right cerebellar and left  frontal infarcts. The ventricles are normal in size, shape and  configuration. Mild diffuse parenchymal volume loss. Mild patchy  periventricular white matter hypodensities which are nonspecific, but  likely related to chronic microvascular ischemic disease. Partially  empty sella.    Scattered paranasal sinus mucosal thickening. No mastoid or middle ear  effusion. The bony calvarium and bones of the skull base appear  intact.       Impression    IMPRESSION: No evidence of an acute territorial infarction.    Dr. Tilley was contacted by me on 3/7/2024 8:09 AM to discuss  findings.     GUILHERME GALARZA MD         SYSTEM ID:  [id number]   CTA Head Neck with Contrast    Narrative    CT ANGIOGRAM OF THE HEAD AND NECK WITH CONTRAST  3/7/2024 7:57 AM     HISTORY: Code Stroke. Evaluate for potential thrombolysis and  thrombectomy. Slurred speech.    TECHNIQUE:  CT angiography with an injection of 67 mL Isovue-370 IV  with scans through the head and neck. Images were transferred to a  separate 3-D workstation where multiplanar reformations and 3-D images  were created. Estimates of carotid stenoses are made relative to the  distal internal carotid artery diameters except as noted. Radiation  dose for this scan was reduced using automated exposure control,  adjustment of the mA and/or kV according to patient size, or iterative  reconstruction technique.      COMPARISON: 8/1/2022.     CT HEAD FINDINGS: No contrast enhancing lesions. Cerebral blood flow  is grossly normal.     CT ANGIOGRAM HEAD FINDINGS: Bilateral carotid siphon atherosclerotic  calcifications without significant stenosis. The major intracranial  arteries including the proximal branches of the  anterior cerebral,  middle cerebral, and posterior cerebral arteries appear patent without  vascular cutoff. No aneurysm identified. No significant stenosis.  Unchanged aplastic versus chronically occluded left A1 segment with  suspected azygos anterior cerebral artery fed by the right A1 segment.  Normal variant fetal origin of the right posterior cerebral artery.  Venous circulation is unremarkable.     CT ANGIOGRAM NECK FINDINGS: Scattered atherosclerotic calcification in  the aortic arch without significant stenosis at the origins of the  great vessels.     Right carotid artery: The right common and internal carotid arteries  are patent. Mild atherosclerotic disease at the carotid bifurcation  and proximal internal carotid artery without significant stenosis by  NASCET criteria.     Left carotid artery: The left common and internal carotid arteries are  patent. Mild atherosclerotic disease at the carotid bifurcation and  proximal internal carotid artery without significant stenosis by  NASCET criteria.     Vertebral arteries: Vertebral arteries are patent without evidence of  dissection. Unchanged diminutive caliber of the left vertebral artery  V4 segment after it gives off the posterior inferior cerebellar  artery.     Other findings: Multilevel cervical spine degenerative changes.      Impression    IMPRESSION:  1. No acute large vessel occlusion throughout the [place]  arteries.  2. No significant stenosis throughout the major neck arteries. No  dissection.  3. 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  4. 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    Dr. Tilley was contacted by me on 3/7/2024 8:09 AM to discuss  findings.      GUILHERME GALARZA MD         SYSTEM ID:  [id number]   CT Head Perfusion w Contrast - For Tier 2 Stroke    Narrative    CT BRAIN PERFUSION  3/7/2024 8:14 AM    HISTORY: Code Stroke.  Evaluate for potential thrombolysis and  thrombectomy. Evaluate mismatch between penumbra and core infarct.  Slurred speech.    TECHNIQUE: Time sequential axial CT images of the head were acquired  during the administration of 50 mL Isovue-370 IV. Color perfusion maps  of the brain were created from this time sequential axial source data.      Radiation dose for this scan was reduced using automated exposure  control, adjustment of the mA and/or kV according to patient size, or  iterative reconstruction technique.    COMPARISON: None.    FINDINGS: No evidence of infarct. On RAPID analysis, there are small  regions of Tmax>6.0s in the bilateral occipital lobes and the left  frontal lobe totaling 17 mL. No corresponding abnormality on  noncontrast CT. On qualitative imaging, there is subtle decreased  cerebral blood flow, most notably to the cranial aspect of the left  cerebral hemisphere, although cerebral blood volume appears relatively  symmetric. Corresponding increased mean transit time, Tmax, and time  to drain involving areas of the left OMKAR, MCA, and OMKAR territories.  These perfusion abnormalities appear overall similar compared to  8/1/2022 and are favored to reflect chronic changes.      Impression    IMPRESSION: No findings to suggest an acute infarct or penumbra.    GUILHERME GALARZA MD         SYSTEM ID:  [id number]   MR Brain w/o & w Contrast    Narrative    MRI OF THE BRAIN WITHOUT AND WITH CONTRAST 3/7/2024 11:04 AM     COMPARISON: Head CT same day.    HISTORY:  Dysarthria, aphasia.    TECHNIQUE: Axial diffusion-weighted with ADC map, axial T2-weighted  with fat saturation, axial T1-weighted, axial turboFLAIR and coronal  T1-weighted images of the brain were acquired without intravenous  contrast.  Following intravenous administration of gadolinium (8 mL  Gadavist), axial T1-weighted images of the brain were acquired.     FINDINGS: There are tiny recent ischemic infarcts in the cortex at the  lateral  left frontal lobe and in the body of the caudate nucleus on  the left. No other recent infarcts.    There is moderate diffuse cerebral volume loss. There are numerous  scattered focal and patchy periventricular areas of abnormal T2 signal  hyperintensity in the cerebral white matter bilaterally that are  consistent with sequela of chronic small vessel ischemic disease. The  ventricles and basal cisterns are within normal limits in  configuration given the degree of cerebral volume loss.  There is no  midline shift.  There are no extra-axial fluid collections.  There is  no evidence for acute intracranial hemorrhage.      There is an area of chronic encephalomalacia in the superior aspect of  the right cerebral hemisphere demonstrating a few foci of abnormal  contrast enhancement likely representing a late subacute ischemic  infarct. There is no other abnormal contrast enhancement in the brain  or its coverings.     There is no sinusitis or mastoiditis.      Impression    IMPRESSION:   1. Recent tiny ischemic infarcts in the left caudate nucleus and left  frontal lobe.  2. Probable late subacute ischemic infarct in the superior aspect of  the right cerebral hemisphere again noted.  3. Diffuse cerebral volume loss and cerebral white matter changes  consistent with chronic small vessel ischemic disease.     JOSE F KEATING MD         SYSTEM ID:  [id number]

## 2024-03-07 NOTE — PLAN OF CARE
Physical Therapy Discharge Summary    Reason for therapy discharge:    All goals and outcomes met, no further needs identified.    Progress towards therapy goal(s). See goals on Care Plan in Epic electronic health record for goal details.  Goals met    Therapy recommendation(s):    Patient would benefit from outpatient PT referral for addressing baseline generalized weakness.

## 2024-03-07 NOTE — ED TRIAGE NOTES
Wife noted patient with slurred speech around 0650, wife last seen patient at baseline last night around 10 pm. History of stroke 2 years ago with similar presentations. Patient reports speech has been improving since onset.      Triage Assessment (Adult)       Row Name 03/07/24 0804          Triage Assessment    Airway WDL WDL        Respiratory WDL    Respiratory WDL WDL        Skin Circulation/Temperature WDL    Skin Circulation/Temperature WDL WDL        Cardiac WDL    Cardiac WDL WDL        Peripheral/Neurovascular WDL    Peripheral Neurovascular WDL WDL        Cognitive/Neuro/Behavioral WDL    Cognitive/Neuro/Behavioral WDL WDL

## 2024-03-07 NOTE — ED NOTES
[facility]  ED Nurse Handoff Report    ED Chief complaint: Slurred Speech      ED Diagnosis:   Final diagnoses:   Acute ischemic stroke (H)   Anticoagulated   History of stroke   Elevated troponin       Code Status: Full Code    Allergies: No Known Allergies    Patient Story: this morning when talking with his wife, she noticed he has slurring of speech  Focused Assessment:  pt lives independently with wife; she noticed some slurred speech this morning; writer at times can hear a slight slur otherwise it seems normal; pt and alert and orientated and able to move everything and uses call light appropriately  US Carotid Bilateral   Preliminary Result   IMPRESSION:     1. Less than 50% diameter stenosis of the right ICA relative to the   distal ICA diameter.    2. Less than 50% diameter stenosis of the left ICA relative to the   distal ICA diameter.       MR Brain w/o & w Contrast   Final Result   IMPRESSION:    1. Recent tiny ischemic infarcts in the left caudate nucleus and left   frontal lobe.   2. Probable late subacute ischemic infarct in the superior aspect of   the right cerebral hemisphere again noted.   3. Diffuse cerebral volume loss and cerebral white matter changes   consistent with chronic small vessel ischemic disease.       JOSE F KEATING MD            SYSTEM ID:  [id number]      CT Head Perfusion w Contrast - For Tier 2 Stroke   Final Result   IMPRESSION: No findings to suggest an acute infarct or penumbra.      GUILHERME GALARZA MD            SYSTEM ID:  [id number]      CTA Head Neck with Contrast   Final Result   IMPRESSION:   1. No acute large vessel occlusion throughout the [place]   arteries.   2. No significant stenosis throughout the major neck arteries. No   dissection.   3. 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   4. 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      Dr. Tilley  was contacted by me on 3/7/2024 8:09 AM to discuss   findings.        GUILHERME GALARZA MD            SYSTEM ID:  [id number]      CT Head w/o Contrast   Final Result   IMPRESSION: No evidence of an acute territorial infarction.      Dr. Tilley was contacted by me on 3/7/2024 8:09 AM to discuss   findings.       GUILHERME GALARZA MD            SYSTEM ID:  [id number]        Labs Ordered and Resulted from Time of ED Arrival to Time of ED Departure   BASIC METABOLIC PANEL - Abnormal       Result Value    Sodium 140      Potassium 4.4      Chloride 104      Carbon Dioxide (CO2) 26      Anion Gap 10      Urea Nitrogen 19.5      Creatinine 1.05      GFR Estimate 67      Calcium 8.6      Glucose 132 (*)    INR - Abnormal    INR 1.35 (*)    PARTIAL THROMBOPLASTIN TIME - Abnormal    aPTT 41 (*)    TROPONIN T, HIGH SENSITIVITY - Abnormal    Troponin T, High Sensitivity 24 (*)    CBC WITH PLATELETS AND DIFFERENTIAL - Abnormal    WBC Count 6.7      RBC Count 4.10 (*)     Hemoglobin 12.7 (*)     Hematocrit 37.4 (*)     MCV 91      MCH 31.0      MCHC 34.0      RDW 13.1      Platelet Count 157      % Neutrophils 44      % Lymphocytes 44      % Monocytes 8      % Eosinophils 3      % Basophils 1      % Immature Granulocytes 0      NRBCs per 100 WBC 0      Absolute Neutrophils 2.9      Absolute Lymphocytes 3.0      Absolute Monocytes 0.6      Absolute Eosinophils 0.2      Absolute Basophils 0.0      Absolute Immature Granulocytes 0.0      Absolute NRBCs 0.0     TROPONIN T, HIGH SENSITIVITY - Abnormal    Troponin T, High Sensitivity 23 (*)    ROUTINE UA WITH MICROSCOPIC - Normal    Color Urine Light Yellow      Appearance Urine Clear      Glucose Urine Negative      Bilirubin Urine Negative      Ketones Urine Negative      Specific Gravity Urine 1.010      Blood Urine Negative      pH Urine 6.5      Protein Albumin Urine Negative      Urobilinogen Urine Normal      Nitrite Urine Negative      Leukocyte Esterase Urine Negative      RBC  "Urine <1      WBC Urine <1     GLUCOSE MONITOR NURSING POCT          Treatments and/or interventions provided: asa  Patient's response to treatments and/or interventions: well    To be done/followed up on inpatient unit:  obs    Does this patient have any cognitive concerns?: Forgetful    Activity level - Baseline/Home:  [place]  Activity Level - Current:   Unknown; haven't gotten pt up; pt is able to help with ADLs    Patient's Preferred language: English   Needed?: No    Isolation: None  Infection: Not Applicable  Patient tested for COVID 19 prior to admission: NO  Bariatric?: No    Vital Signs:   Vitals:    03/07/24 0742 03/07/24 0808 03/07/24 1300   BP: (!) 173/75  (!) 168/83   Pulse: 84  58   Resp: 18 28    Temp: 98.2  F (36.8  C)     TempSrc: Temporal     SpO2: 96% 98% 97%   Weight: 76.1 kg (167 lb 12.3 oz)     Height: 1.778 m (5' 10\")         Cardiac Rhythm:     Was the PSS-3 completed:   Yes  What interventions are required if any?               Family Comments: son at bedside; wife has been updated about admission   OBS brochure/video discussed/provided to patient/family: No              Name of person given brochure if not patient:               Relationship to patient:     For the majority of the shift this patient's behavior was Green.   Behavioral interventions performed were .    ED NURSE PHONE NUMBER: *[phone]         "

## 2024-03-07 NOTE — PROGRESS NOTES
"CLINICAL SWALLOW EVALUATION     03/07/24 1442   Appointment Info   Signing Clinician's Name / Credentials (SLP) [facility]   Quick Adds Certification   General Information   Onset of Illness/Injury or Date of Surgery 03/07/24   Referring Physician Lindsay Lou MD   Patient/Family Therapy Goal Statement (SLP) Patient is hungry/thirsty.   Pertinent History of Current Problem Per MD note: \"Nathan Macias is a 89 year old male with a history of atrial fibrillation on chronic anticoagulation with apixaban, type 2 diabetes, BPH, hyperlipidemia who presents via EMS with complaints of significant speech difficulties.  Patient felt fine when he went to bed last night but started noticing difficulty with his speech when he woke up this morning.  He had trouble getting his words out.  Denies any recent head trauma, fever or chills or any infectious etiology.  Denies any chest pain or lightheadedness.  His wife did report that he had significant aphasia.  There was no difficulty with his gait.  His wife noted slurring of his words at 650 this morning.  EMS was called and he was found to be hypertensive with systolic blood pressures in the 170s and he was brought to the hospital where code stroke was called.  By the time I evaluated the patient his symptoms were almost completely resolved.  He continues to have some mild expressive aphasia with word finding difficulties. CT head without contrast showed no acute stroke.  MRI brain with and without contrast showed recent tiny ischemic infarct in the left caudate nucleus and left frontal lobe. Patient has had previous strokes with right cerebellar stroke and aphasia with ataxia and delirium in 2022 for which he had a month-long hospital stay but has since recovered from this.\"   General Observations Patient alert, pleasant, interactive. Patient reported no swallowing difficulty although has not had anything to eat or drink since last night. Patient reported " "difficulty getting words out or forming words but no difficulty thinking of words. He reported this has improved significantly.   Type of Evaluation   Type of Evaluation Swallow Evaluation   Oral Motor   Oral Musculature anomalies present   Structural Abnormalities none present   Mucosal Quality adequate   Dentition (Oral Motor)   Comment, Dentition (Oral Motor) Patient reported upper denture fits snug and that he never takes it out (RN notified). Lower denture appears somewhat loose; patient does not use adhesive.   Dentition (Oral Motor) edentulous;dental appliance/dentures   Dental Appliance/Denture (Oral Motor) upper and lower;dentures, full   Lip Function (Oral Motor)   Lip Range of Motion (Oral Motor) retraction impairment   Lip Strength (Oral Motor) WFL   Retraction, Lip Range of Motion left side;minimal impairment   Tongue Function (Oral Motor)   Tongue Strength (Oral Motor) strength decreased;bilateral   Tongue Coordination/Speed (Oral Motor) WNL   Tongue ROM (Oral Motor) WNL   Jaw Function (Oral Motor)   Jaw Function (Oral Motor) WNL   Cough/Swallow/Gag Reflex (Oral Motor)   Soft Palate/Velum (Oral Motor) elevation decreased on right  (minimal)   Volitional Throat Clear/Cough (Oral Motor) WNL   Vocal Quality/Secretion Management (Oral Motor)   Vocal Quality (Oral Motor) WFL   Secretion Management (Oral Motor) WNL   General Swallowing Observations   Past History of Dysphagia VFSS 8/5/2022 previous admission for right cerebellar stroke: \"Pt presents with moderate oropharyngeal dysphagia on Video Swallow Study. Silent aspiration noted with thin liquids, along with deep penetration and remaining residue with mildly thick liquids. Deficits in oral awareness, timing/coordination and control, reduced tongue base retraction, epiglottic inversion and pharyngeal constriction. Fatigue likely impacting function. Pt currently unable to follow commands for use of swallow strategies.\" Patient initially on minced/moist " "textures and moderately thick liquids. Patient discharged from acute setting to TCU on puree textures and mildly thick liquids and no straw. Per notes, patient discharged from TCU 9/15/2022 on regular diet/thin liquids. Patient did not recall being on thickened liquids in the past, but did recall being on \"mush\" textures. Patient reported no current swallowing difficulty.   Comment, General Swallowing Observations No chest imaging in EMR review since diet advancement.   Respiratory Support room air   Current Diet/Method of Nutritional Intake (General Swallowing Observations, NIS) NPO   Swallowing Evaluation Clinical swallow evaluation   Clinical Swallow Evaluation   Feeding Assistance no assistance needed   Clinical Swallow Evaluation Textures Trialed thin liquids;pureed;solid foods   Clinical Swallow Eval: Thin Liquid Texture Trial   Mode of Presentation, Thin Liquids spoon;cup;straw;fed by clinician;self-fed   Volume of Liquid or Food Presented 1 ice chip, 8 oz thin water   Oral Phase of Swallow WFL;premature pharyngeal entry  (suspected with large bolus)   Pharyngeal Phase of Swallow throat clearing  (x2 with larger bolus)   Diagnostic Statement No overt aspiration signs occurred with small single sips by cup or straw.   Clinical Swallow Evaluation: Puree Solid Texture Trial   Mode of Presentation, Puree spoon;self-fed   Volume of Puree Presented 4 oz applesauce   Oral Phase, Puree WFL   Pharyngeal Phase, Puree intact   Clinical Swallow Evaluation: Solid Food Texture Trial   Mode of Presentation self-fed   Volume Presented 1 trey cracker square   Oral Phase impaired mastication  (mildly extended)   Pharyngeal Phase intact   Esophageal Phase of Swallow   Patient reports or presents with symptoms of esophageal dysphagia Yes   Esophageal comments Mild regurgitation sounds x1-2.   Swallowing Recommendations   Diet Consistency Recommendations regular diet;thin liquids (level 0)   Supervision Level for Intake distant " supervision needed   Mode of Delivery Recommendations bolus size, small;slow rate of intake   Swallowing Maneuver Recommendations alternate food and liquid intake   Monitoring/Assistance Required (Eating/Swallowing) monitor for cough or change in vocal quality with intake;stop eating activities when fatigue is present   Recommended Feeding/Eating Techniques (Swallow Eval) maintain upright posture during/after eating for 30 minutes;minimize distractions during oral intake   Medication Administration Recommendations, Swallowing (SLP) One at a time.   Instrumental Assessment Recommendations instrumental evaluation not recommended at this time   General Therapy Interventions   Planned Therapy Interventions Dysphagia Treatment   Dysphagia treatment Instruction of safe swallow strategies   Clinical Impression   Criteria for Skilled Therapeutic Interventions Met (SLP Eval) Yes, treatment indicated   SLP Diagnosis Mild oropharyngeal dysphagia   Risks & Benefits of therapy have been explained evaluation/treatment results reviewed;care plan/treatment goals reviewed;risks/benefits reviewed;current/potential barriers reviewed;participants voiced agreement with care plan;participants included;patient   Clinical Impression Comments Mild oropharyngeal dysphagia based on clinical swallow evaluation in setting of stroke. Oral mech exam remarkable for edentulous status with full upper/lower dentures and mildly reduced oral labial retraction left side and possible minimal reduced uvular elevation on right side. Patient has hx of dysphagia with diet modifications following prior stroke in 2022, but has been tolerating regular diet since then.  Patient assessed with thin liquid, puree and solid texture. Note 2 instances of throat clear after larger sips thin water. No other throat clearing occurred. No coughing and vocal qualtiy remained unchanged. Note mildly extended mastication of solid texture; no oral residue remained. Patient  benefitted from occasional cues for swallow strategies. Recommend regular diet and thin liquids given swallow strategies and reflux precautions (fully upright position, small bites/sips, slow pace). Please serve pills one at a time. Monitor for increased signs/symptoms of aspiration and hold PO if occur. SLP to follow for swallowing and dysarthria.   SLP Total Evaluation Time   Eval: oral/pharyngeal swallow function, clinical swallow Minutes (22139) 10   Therapy Certification   Start of Care Date 03/07/24   Certification date from 03/07/24   Certification date to 04/05/24   Medical Diagnosis stroke   SLP Goals   Therapy Frequency (SLP Eval) 5 times/week   SLP Predicted Duration/Target Date for Goal Attainment 04/04/24   SLP Goals Swallow   SLP: Safely tolerate diet without signs/symptoms of aspiration Regular diet;Thin liquids;With use of swallow precautions   Interventions   Interventions Quick Adds Swallowing Dysfunction   Swallowing Dysfunction &/or Oral Function for Feeding   Treatment of Swallowing Dysfunction &/or Oral Function for Feeding Minutes (08567) 8   Symptoms Noted During/After Treatment None   Treatment Detail/Skilled Intervention Patient practiced small single sip strategy and achieved given min to no cues. Provided further education about swallow strategies and signs of aspiration. Patient instructed to notify RN if aspiraiton signs occur.   SLP Discharge Planning       SLP Discharge Recommendation home with outpatient therapy services;home with assist  (pending progress; defer to PT/OT pending recommendations)   SLP Rationale for DC Rec Patient may meet SLP goals prior to discharge.   SLP Brief overview of current status  Recommend regular diet and thin liquids given swallow strategies and reflux precautions (fully upright position, small bites/sips, slow pace). Please serve pills one at a time. Monitor for increased signs/symptoms of aspiration and hold PO if occur. SLP to follow for swallowing  and dysarthria.     [facility]  OUTPATIENT SPEECH LANGUAGE PATHOLOGY EVALUATION  PLAN OF TREATMENT FOR OUTPATIENT REHABILITATION  (COMPLETE FOR INITIAL CLAIMS ONLY)  Patient's Last Name, First Name, M.I.  YOB: 1933  Nathan Macias                        Provider's Name  [facility] Medical Record No.  [id number]                             Onset Date:  03/07/24  Start of Care Date: (P) 03/07/24    Type:     ___PT   ___OT   _X_SLP Medical Diagnosis: (P) stroke          SLP Diagnosis:  (P) Mild oropharyngeal dysphagia  Visits from SOC:  1     See note for plan of treatment, functional goals and certification details    I CERTIFY THE NEED FOR THESE SERVICES FURNISHED UNDER        THIS PLAN OF TREATMENT AND WHILE UNDER MY CARE     (Physician co-signature of this document indicates review and certification of the therapy plan).

## 2024-03-07 NOTE — ED PROVIDER NOTES
"History   Chief Complaint:  Speech disturbance    HPI  Nathan Macias is a 89 year old male who presents by EMS for evaluation of speech disturbance.  The patient states that he does not feel like his speech is right, says he does not think he can get his words out.  He specifically denies any pain.  He states that he went to bed feeling fine last night, but noticed his symptoms once he woke up this morning.  He specifically denies any recent vomiting.  He states he had slight diarrhea about a week ago which is since resolved.  No chest pain.  No headache.  No trauma.  He ambulates with a walker at the residence he shares with his wife.  He denies any symptoms in his arms or legs.  He states his last Eliquis use was last night.    Independent Historian: EMS, who reports that his wife noticed that he was \"slurring\" his words at \"0650\" this morning.  Blood sugar was 146, systolic blood pressures in the 170s during transport by EMS who did not note any lateralizing weakness in his extremities.    Review of External Notes: I reviewed his prior records including discharge summary from August 2022 at which time he had some aphasia attributed to an ischemic stroke.    Medications:    No current outpatient medications on file.    Past Medical History:    Past Medical History:   Diagnosis Date    A-fib (H)     BPH (benign prostatic hyperplasia)     CAD (coronary artery disease)     DMII (diabetes mellitus, type 2) (H)     TIA (transient ischemic attack)      Patient Active Problem List    Diagnosis Date Noted    History of stroke 03/07/2024     Priority: Medium    Acute ischemic stroke (H) 03/07/2024     Priority: Medium    Anticoagulated 03/07/2024     Priority: Medium    Atrial fibrillation, unspecified type (H) 09/06/2022     Priority: Medium    Type 2 diabetes mellitus with other specified complication, with long-term current use of insulin (H) 09/06/2022     Priority: Medium    Aphasia 08/01/2022     Priority: " "Medium    Cerebrovascular accident (CVA), unspecified mechanism (H) 08/01/2022     Priority: Medium    Sprain and strain of unspecified site of knee and leg 03/31/2008     Priority: Medium    Sprain/strain 03/31/2008     Priority: Medium     Problem list name updated by automated process. Provider to review        Physical Exam   Patient Vitals for the past 24 hrs:   BP Temp Temp src Pulse Resp SpO2 Height Weight   03/07/24 1357 (!) 182/85 97.7  F (36.5  C) Oral 62 16 98 % -- --   03/07/24 1300 (!) 168/83 -- -- 58 -- 97 % -- --   03/07/24 0808 -- -- -- -- 28 98 % -- --   03/07/24 0742 (!) 173/75 98.2  F (36.8  C) Temporal 84 18 96 % 1.778 m (5' 10\") 76.1 kg (167 lb 12.3 oz)      Physical Exam  General: Male semirecumbent in stabilization room 3  HENT: mucous membranes moist  CV: rate as above, slightly irregular rhythm, radial pulses palpable  Resp: normal effort, speaks in full phrases, no stridor, no cough observed  GI: abdomen soft and nontender, no guarding  MSK: no bony tenderness  Skin: appropriately warm and dry  Neuro: awake, alert, comprehensible speech though occasional mild expressive aphasia and possible subtle dysarthria as well, face symmetric, no pronator drift,  normal, strength and sensation intact in all extr, no nuchal rigidity, ambulation not initially tested  Psych: No apparent hallucinations    Emergency Department Course   Electrocardiogram  ECG taken at 0809, ECG interpreted at 0827 by DARRYL Tilley MD  Sinus rhythm, pre-existing RBBB c/w 8/1/22  Rate 65 bpm. TN interval 186. QRS duration 160. QTc 459    Imaging:  US Carotid Bilateral   Preliminary Result   IMPRESSION:     1. Less than 50% diameter stenosis of the right ICA relative to the   distal ICA diameter.    2. Less than 50% diameter stenosis of the left ICA relative to the   distal ICA diameter.       MR Brain w/o & w Contrast   Final Result   IMPRESSION:    1. Recent tiny ischemic infarcts in the left caudate nucleus and left "   frontal lobe.   2. Probable late subacute ischemic infarct in the superior aspect of   the right cerebral hemisphere again noted.   3. Diffuse cerebral volume loss and cerebral white matter changes   consistent with chronic small vessel ischemic disease.       JOSE F KEATING MD            SYSTEM ID:  [id number]      CT Head Perfusion w Contrast - For Tier 2 Stroke   Final Result   IMPRESSION: No findings to suggest an acute infarct or penumbra.      GUILHERME GALARZA MD            SYSTEM ID:  [id number]      CTA Head Neck with Contrast   Final Result   IMPRESSION:   1. No acute large vessel occlusion throughout the [place]   arteries.   2. No significant stenosis throughout the major neck arteries. No   dissection.   3. 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   4. 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      Dr. Tilley was contacted by me on 3/7/2024 8:09 AM to discuss   findings.        GUILHERME GALARZA MD            SYSTEM ID:  [id number]      CT Head w/o Contrast   Final Result   IMPRESSION: No evidence of an acute territorial infarction.      Dr. Tilley was contacted by me on 3/7/2024 8:09 AM to discuss   findings.       GUILHERME GALARZA MD            SYSTEM ID:  [id number]         Laboratory:  Labs Ordered and Resulted from Time of ED Arrival to Time of ED Departure   BASIC METABOLIC PANEL - Abnormal       Result Value    Sodium 140      Potassium 4.4      Chloride 104      Carbon Dioxide (CO2) 26      Anion Gap 10      Urea Nitrogen 19.5      Creatinine 1.05      GFR Estimate 67      Calcium 8.6      Glucose 132 (*)    INR - Abnormal    INR 1.35 (*)    PARTIAL THROMBOPLASTIN TIME - Abnormal    aPTT 41 (*)    TROPONIN T, HIGH SENSITIVITY - Abnormal    Troponin T, High Sensitivity 24 (*)    CBC WITH PLATELETS AND DIFFERENTIAL - Abnormal    WBC Count 6.7      RBC Count 4.10 (*)     Hemoglobin 12.7 (*)      Hematocrit 37.4 (*)     MCV 91      MCH 31.0      MCHC 34.0      RDW 13.1      Platelet Count 157      % Neutrophils 44      % Lymphocytes 44      % Monocytes 8      % Eosinophils 3      % Basophils 1      % Immature Granulocytes 0      NRBCs per 100 WBC 0      Absolute Neutrophils 2.9      Absolute Lymphocytes 3.0      Absolute Monocytes 0.6      Absolute Eosinophils 0.2      Absolute Basophils 0.0      Absolute Immature Granulocytes 0.0      Absolute NRBCs 0.0     TROPONIN T, HIGH SENSITIVITY - Abnormal    Troponin T, High Sensitivity 23 (*)    ROUTINE UA WITH MICROSCOPIC - Normal    Color Urine Light Yellow      Appearance Urine Clear      Glucose Urine Negative      Bilirubin Urine Negative      Ketones Urine Negative      Specific Gravity Urine 1.010      Blood Urine Negative      pH Urine 6.5      Protein Albumin Urine Negative      Urobilinogen Urine Normal      Nitrite Urine Negative      Leukocyte Esterase Urine Negative      RBC Urine <1      WBC Urine <1     GLUCOSE MONITOR NURSING POCT      Emergency Department Course:  Reviewed:  I reviewed nursing notes, vitals, and past medical history    Assessments/Consultations/Discussion of Management or Tests :  I obtained history and examined the patient as noted above.   ED Course as of 03/07/24 1435   Thu Mar 07, 2024   0744 I spoke with BRICE Olivia with Stroke Neurology team.   0749 I called patient's home number attempting to reach angel Huang, no answer so I left a message requesting callback.   0807 I spoke with Radiologist, no acute findings on CT/CTA.   0813 I spoke with Stroke Neurology team BRICE Olivia again in person.  Plan for MRI brain, code stroke de-escalated.   0813 I spoke with angel Huang by phone.   0817 I spoke with ED RN.   0951 I spoke with RN, pt still in room, requested cath UA and repeat troponin.  Still has not gone for MRI.   1124 I was called by BRICE Olivia with Stroke Neuro, MRI shows stroke, she recommends admission  for further workup, no immediate change to medications.   1127 I rechecked patient, discussed MRI results.   1141 I spoke with Dr. Lou, Hospitalist, who accepts care.       Independent Interpretation (X-rays, CTs, rhythm strip):  I personally reviewed CT head images, I see no acute hemorrhage.    Interventions:  Medications   apixaban ANTICOAGULANT (ELIQUIS) tablet 5 mg (has no administration in time range)   atorvastatin (LIPITOR) tablet 10 mg (has no administration in time range)   Medication Instructions - Avoid dextrose in IV solutions. (has no administration in time range)   aspirin (ASA) chewable tablet 81 mg (81 mg Oral $Given 3/7/24 1305)   tamsulosin (FLOMAX) capsule 0.8 mg (has no administration in time range)   iopamidol (ISOVUE-370) solution 117 mL (117 mLs Intravenous $Given 3/7/24 0756)   Saline flush (100 mLs Intravenous $Given 3/7/24 0756)   midazolam (VERSED) injection 0.5 mg (0.5 mg Intravenous $Given 3/7/24 1015)   gadobutrol (GADAVIST) injection 8 mL (8 mLs Intravenous $Given 3/7/24 1104)      Social Determinants of Health affecting care:   Healthcare Access/Compliance    Disposition:  Admit to Dr. Lou    Impression & Plan    Medical Decision Making:  Based on acute onset of focal neurologic symptoms, code stroke was activated and I am grateful for the prompt assistance of the stroke neurology team.  He has no large vessel occlusion and based on timeframe from last known well, he is not a candidate for thrombectomy nor thrombolytics.  However MRI was pursued at the recommendation of the stroke neurology team who ordered the study and acute ischemic stroke is identified, so he will be hospitalized for further close monitoring and further workup.  He has an elevated troponin but no chest pain, therefore cardiology consultation was deferred emergently though this will need to be followed with serial labs and close monitoring clinically as well.  I have arranged for him to be admitted to a  monitored bed under the care of the hospitalist service for further multidisciplinary care.  Stroke neurology team recommended against any immediate administration of blood thinning medications.    Diagnosis:    ICD-10-CM    1. Acute ischemic stroke (H)  I63.9       2. Anticoagulated  Z79.01       3. History of stroke  Z86.73       4. Elevated troponin  R79.89          3/7/2024   MD Jarek Courtney, Sriram Escobar MD  03/07/24 1430

## 2024-03-07 NOTE — PROGRESS NOTES
03/07/24 1600   Appointment Info   Signing Clinician's Name / Credentials (PT) Julia Weiler, SPT   Student Supervision Therapy services provided with the co-signing licensed therapist guiding and directing the services, and providing the skilled judgement and assessment throughout the session  (Radha Mac, PT, DPT)   Quick Adds   Quick Adds Certification   Living Environment   People in Home spouse   Current Living Arrangements apartment   Home Accessibility no concerns   Living Environment Comments Patient lives in apartment with his wife, no stairs to navigate. He and his wife are independent at baseline with mobility and home tasks. Pt has grab bars and shower bench.   Self-Care   Usual Activity Tolerance fair   Current Activity Tolerance fair   Regular Exercise No   Equipment Currently Used at Home walker, rolling   Fall history within last six months no   Activity/Exercise/Self-Care Comment Patient uses 4WW at baseline   General Information   Onset of Illness/Injury or Date of Surgery 03/07/24   Referring Physician Lindsay Lou MD   Patient/Family Therapy Goals Statement (PT) Return home with wife   Pertinent History of Current Problem (include personal factors and/or comorbidities that impact the POC) 89 year old male admitted on 3/7/24 when he awoke with severe expressive aphasia. Symptoms have since resolved, small L ischemic infarct found in frontal lobe. Patient also has a history of a R cerebellar stroke, atrial fibrillation on chronic anticoagulation with apixaban, type 2 diabetes, BPH, and hyperlipidemia.   Cognition   Affect/Mental Status (Cognition) WFL   Orientation Status (Cognition) oriented x 4   Follows Commands (Cognition) follows one-step commands;over 90% accuracy;delayed response/completion   Pain Assessment   Patient Currently in Pain No   Posture    Posture Forward head position;Protracted shoulders;Kyphosis   Posture Comments Significanly increased thoracic kyphosis   Range  of Motion (ROM)   Range of Motion ROM is WFL  (Impaired trunk mobility)   Strength (Manual Muscle Testing)   Strength (Manual Muscle Testing) strength is WFL   Strength Comments Minimal global strength deficits at baseline with functional transfers.   Bed Mobility   Bed Mobility bed mobility (all) activities   All Activities, [place] (Bed Mobility) independent   Impairments Contributing to Impaired Bed Mobility decreased strength   Transfers   Transfers sit-stand transfer   Transfer Safety Concerns Noted decreased sequencing ability   Impairments Contributing to Impaired Transfers decreased sensation   Sit-Stand Transfer   Sit-Stand [place] (Transfers) modified independence;verbal cues   Assistive Device (Sit-Stand Transfers) walker, 4-wheeled   Gait/Stairs (Locomotion)   [place] Level (Gait) supervision   Assistive Device (Gait) walker, 4-wheeled   Distance in Feet (Gait) 10   Pattern (Gait) step-through   Deviations/Abnormal Patterns (Gait) base of support, narrow;marilia decreased;stride length decreased   Balance   Balance other (describe)   Balance Comments Moderate dynamic balance impairement, requires support of 4WW at baseline   Sensory Examination   Sensory Perception patient reports no sensory changes   Clinical Impression   Criteria for Skilled Therapeutic Intervention Yes, treatment indicated   PT Diagnosis (PT) Impaired mobility   Influenced by the following impairments decreased strength, impaired balance   Functional limitations due to impairments gait, functional transfers   Clinical Presentation (PT Evaluation Complexity) stable   Clinical Presentation Rationale Pt at baseline mobility, safe discharge location, support from wife   Clinical Decision Making (Complexity) low complexity   Planned Therapy Interventions (PT) gait training;patient/family education;progressive activity/exercise   Risk & Benefits of therapy have been explained evaluation/treatment results reviewed;care  plan/treatment goals reviewed;risks/benefits reviewed;current/potential barriers reviewed;participants voiced agreement with care plan;participants included;patient   PT Total Evaluation Time   PT Eval, Low Complexity Minutes (47315) 10   Therapy Certification   Start of care date 03/07/24   Certification date from 03/07/24   Certification date to 03/07/24   Medical Diagnosis Stroke   Physical Therapy Goals   PT Frequency One time eval and treatment only   PT Predicted Duration/Target Date for Goal Attainment 03/07/24   PT Goals Bed Mobility;Transfers;Gait   PT: Bed Mobility Independent;Supine to/from sit;Rolling;Bridging;Goal Met;Completed   PT: Transfers Modified independent;Sit to/from stand;Bed to/from chair;Assistive device;Goal Met;Completed   PT: Gait Modified independent;Rolling walker;100 feet;Goal Met;Completed   Interventions   Interventions Quick Adds Gait Training   Gait Training   Gait Training Minutes (13713) 13   Symptoms Noted During/After Treatment (Gait Training) none   Treatment Detail/Skilled Intervention Patient ambulated 120' with 4WW and SBA progressing to Latisha in session. Pt given cues to stay closer within walker. Pt wondering about the difference between 4WW adn FWW. Second bout of gait training completed in order to trial/assess use of FWW. Patient ambulated 100' with Latisha and FWW. Again verbal cueing for standing cloer to walker- able to make correction better with FWW. Discussed with pt that it's a good idea to continue using his walker at home for safe mobility- pt agrees this is helpful. Stand>sit in recliner Latisha with FWW at end of session. Chair alarm on, call light and needs in reach.   PT Discharge Planning   PT Plan Discharge   PT Discharge Recommendation (DC Rec) home;home with outpatient physical therapy   PT Rationale for DC Rec Pt currently at his baseline mobility. At baseline lives at home in an apartment with his wife, they are both independent with all mobility. Pt uses a  4WW at baseline. In session pt demonstrates safe ambulation, transfers, and bed mobility with use of 4WW. Pt safe to return home with his wife at this time. Would benefit from further skilled outpatient PT services for increased strength and functional mobility.   PT Brief overview of current status Oli with 4WW or FWW   Total Session Time   Timed Code Treatment Minutes 13   Total Session Time (sum of timed and untimed services) 23       [facility]  OUTPATIENT PHYSICAL THERAPY EVALUATION  PLAN OF TREATMENT FOR OUTPATIENT REHABILITATION  (COMPLETE FOR INITIAL CLAIMS ONLY)  Patient's Last Name, First Name, M.I.  YOB: 1933  Nathan Macias                        Provider's Name  [facility] Medical Record No.  [id number]                             Onset Date:  03/07/24   Start of Care Date:  03/07/24   Type:     _X_PT   ___OT   ___SLP Medical Diagnosis:  Stroke              PT Diagnosis:  Impaired mobility Visits from SOC:  1     See note for plan of treatment, functional goals and certification details    I CERTIFY THE NEED FOR THESE SERVICES FURNISHED UNDER        THIS PLAN OF TREATMENT AND WHILE UNDER MY CARE     (Physician co-signature of this document indicates review and certification of the therapy plan).

## 2024-03-07 NOTE — PHARMACY-ADMISSION MEDICATION HISTORY
Pharmacist Admission Medication History    Admission medication history is complete. The information provided in this note is only as accurate as the sources available at the time of the update.    Information Source(s): Family member via phone.  Spoke w/ pt's wife, Virginia, via phone [phone].  She read through the med bottles at home.    Pertinent Information:   --  Pt doesn't know his medications and requested that I call  his wife.  Pt last took all of his medications yesterday.  Surescripts and CareEverywhere were not available.    Changes made to PTA medication list:  Added: None  Deleted: Tylenol PRN, loratadine, melatonin, quetiapine.  Changed: atorvastatin, metoprolol, tamsulosin.    Allergies reviewed with patient and updates made in EHR: no    Medication History Completed By: Nubia Viveros PharmD 3/7/2024 12:51 PM    PTA Med List   Medication Sig Last Dose    apixaban ANTICOAGULANT (ELIQUIS) 5 MG tablet Take 5 mg by mouth 2 times daily 3/6/2024    atorvastatin (LIPITOR) 10 MG tablet Take 10 mg by mouth at bedtime 3/6/2024    metFORMIN (GLUCOPHAGE XR) 500 MG 24 hr tablet Take 500 mg by mouth daily (with breakfast) 3/6/2024    metoprolol succinate ER (TOPROL XL) 25 MG 24 hr tablet Take 25 mg by mouth daily 3/6/2024    tamsulosin (FLOMAX) 0.4 MG capsule Take 0.8 mg by mouth every evening 3/6/2024

## 2024-03-08 LAB
GLUCOSE BLDC GLUCOMTR-MCNC: 126 MG/DL (ref 70–99)
GLUCOSE BLDC GLUCOMTR-MCNC: 167 MG/DL (ref 70–99)
GLUCOSE BLDC GLUCOMTR-MCNC: 197 MG/DL (ref 70–99)
GLUCOSE BLDC GLUCOMTR-MCNC: 225 MG/DL (ref 70–99)

## 2024-03-08 PROCEDURE — G0378 HOSPITAL OBSERVATION PER HR: HCPCS

## 2024-03-08 PROCEDURE — 99207 PR APP CREDIT; MD BILLING SHARED VISIT: CPT | Performed by: NURSE PRACTITIONER

## 2024-03-08 PROCEDURE — 250N000013 HC RX MED GY IP 250 OP 250 PS 637: Performed by: HOSPITALIST

## 2024-03-08 PROCEDURE — 250N000013 HC RX MED GY IP 250 OP 250 PS 637: Performed by: NURSE PRACTITIONER

## 2024-03-08 PROCEDURE — 99232 SBSQ HOSP IP/OBS MODERATE 35: CPT | Performed by: HOSPITALIST

## 2024-03-08 PROCEDURE — 82962 GLUCOSE BLOOD TEST: CPT

## 2024-03-08 PROCEDURE — 250N000012 HC RX MED GY IP 250 OP 636 PS 637: Performed by: HOSPITALIST

## 2024-03-08 RX ORDER — DEXTROSE MONOHYDRATE 25 G/50ML
25-50 INJECTION, SOLUTION INTRAVENOUS
Status: DISCONTINUED | OUTPATIENT
Start: 2024-03-08 | End: 2024-03-10 | Stop reason: HOSPADM

## 2024-03-08 RX ORDER — NICOTINE POLACRILEX 4 MG
15-30 LOZENGE BUCCAL
Status: DISCONTINUED | OUTPATIENT
Start: 2024-03-08 | End: 2024-03-10 | Stop reason: HOSPADM

## 2024-03-08 RX ORDER — ASPIRIN 81 MG/1
81 TABLET, CHEWABLE ORAL DAILY
Qty: 90 TABLET | Refills: 0 | Status: SHIPPED | OUTPATIENT
Start: 2024-03-09

## 2024-03-08 RX ADMIN — ASPIRIN 81 MG CHEWABLE TABLET 81 MG: 81 TABLET CHEWABLE at 08:14

## 2024-03-08 RX ADMIN — ATORVASTATIN CALCIUM 10 MG: 10 TABLET, FILM COATED ORAL at 21:20

## 2024-03-08 RX ADMIN — TAMSULOSIN HYDROCHLORIDE 0.8 MG: 0.4 CAPSULE ORAL at 21:19

## 2024-03-08 RX ADMIN — INSULIN ASPART 2 UNITS: 100 INJECTION, SOLUTION INTRAVENOUS; SUBCUTANEOUS at 11:55

## 2024-03-08 RX ADMIN — APIXABAN 5 MG: 5 TABLET, FILM COATED ORAL at 21:19

## 2024-03-08 RX ADMIN — APIXABAN 5 MG: 5 TABLET, FILM COATED ORAL at 08:14

## 2024-03-08 ASSESSMENT — ACTIVITIES OF DAILY LIVING (ADL)
ADLS_ACUITY_SCORE: 31
ADLS_ACUITY_SCORE: 31
ADLS_ACUITY_SCORE: 44
ADLS_ACUITY_SCORE: 31
DEPENDENT_IADLS:: CLEANING
ADLS_ACUITY_SCORE: 44
ADLS_ACUITY_SCORE: 30
ADLS_ACUITY_SCORE: 31
ADLS_ACUITY_SCORE: 44
ADLS_ACUITY_SCORE: 31
ADLS_ACUITY_SCORE: 31
ADLS_ACUITY_SCORE: 44
ADLS_ACUITY_SCORE: 31
ADLS_ACUITY_SCORE: 31
ADLS_ACUITY_SCORE: 44
ADLS_ACUITY_SCORE: 31
ADLS_ACUITY_SCORE: 44
ADLS_ACUITY_SCORE: 31
ADLS_ACUITY_SCORE: 31
ADLS_ACUITY_SCORE: 44
ADLS_ACUITY_SCORE: 31
ADLS_ACUITY_SCORE: 44

## 2024-03-08 NOTE — CONSULTS
[facility]    Stroke Consult Note    Reason for Consult:  stroke     Chief Complaint: Slurred Speech       HPI  Nathan Macias is a 89 year old male with past medical history significant for BPH, right cerebellar stroke (p/w aphasia and dysarthria), HTN, MCI, hearing loss, atrial fibrillation on apixaban, DM2. He presented to the ED 3/7/24 with speech difficulty. His last known well was the night prior. He woke up feeling like his normal self and went to make coffee. He then spoke to his wife who noticed that his speech was slurred (this was around 0650). He notes that he's had some congestion and intermittent diarrhea for the last week or so. He hasn't missed any doses of apixaban lately.    Today on exam, he reports that his speech seems back to baseline. He denies new focal neurologic deficits.     Stroke Evaluation Summarized    MRI/Head CT Recent tiny infarcts in the left caudate and left frontal lobe. Chronic right cerebellar infarct. On stroke team review, no evidence of late subacute right hemisphere infarct.    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 He reports compliance with Eliquis. Possible etiologies include mild L ICA stenosis with associated mixed plaque vs breakthrough stroke despite anticoagulation.     Recommendations   Secondary Stroke Prevention  - Continue Eliquis 5 mg BID.   - Aspirin 81 mg daily x 90days  - LDL 41, now on Lipitor 10 mg daily.  "Titrate to goal LDL 40-70  - A1c 6.8, goal <7.0  - Goal BP <130/80 with tighter control associated with decreased overall CV risk, if tolerated. Discussed the importance of home BP monitoring and keeping a log for PCP.  - PT/OT/SLP  - Stroke education. Discussed stroke warning signs (BE FAST) and need for emergent presentation if symptoms occur    Diagnostic Evaluation  - TTE pending    Patient Follow-up    - in the next 1-2 week(s) with PCP  - in 6-8 weeks with any stroke SILVINA ([phone])    Thank you for this consult.  We will continue to follow peripherally for results of TTE.     Chyna Prince, CNP  Vascular Neurology    To page me or covering stroke neurology team member, click here: [facility]  Choose \"On Call\" tab at top, then select \"NEUROLOGY/ALL SITES\" from middle drop-down box, press Enter, then look for \"stroke\" or \"telestroke\" for your site.  _____________________________________________________    Clinically Significant Risk Factors Present on Admission               # Drug Induced Coagulation Defect: home medication list includes an anticoagulant medication        # DMII: A1C = 6.8 % (Ref range: <5.7 %) within past 6 months               Past Medical History    Past Medical History:   Diagnosis Date    A-fib (H)     BPH (benign prostatic hyperplasia)     CAD (coronary artery disease)     DMII (diabetes mellitus, type 2) (H)     TIA (transient ischemic attack)      Medications   Home Meds  Prior to Admission medications    Medication Sig Start Date End Date Taking? Authorizing Provider   apixaban ANTICOAGULANT (ELIQUIS) 5 MG tablet Take 5 mg by mouth 2 times daily   Yes Unknown, Entered By History   atorvastatin (LIPITOR) 10 MG tablet Take 10 mg by mouth at bedtime   Yes Unknown, Entered By History   metFORMIN (GLUCOPHAGE XR) 500 MG 24 hr tablet Take 500 mg by mouth daily (with breakfast)   Yes Unknown, Entered By History   metoprolol succinate ER (TOPROL XL) 25 MG 24 hr tablet Take 25 mg by mouth daily "   Yes Unknown, Entered By History   tamsulosin (FLOMAX) 0.4 MG capsule Take 0.8 mg by mouth every evening   Yes Unknown, Entered By History   lisinopril-hydrochlorothiazide (ZESTORETIC) 20-25 MG tablet Take 1 tablet by mouth daily  8/7/22  Unknown, Entered By History       Scheduled Meds   apixaban ANTICOAGULANT  5 mg Oral BID    aspirin  81 mg Oral Daily    atorvastatin  10 mg Oral Daily    tamsulosin  0.8 mg Oral QPM       Infusion Meds   - MEDICATION INSTRUCTIONS -         Allergies   No Known Allergies       PHYSICAL EXAMINATION   Temp:  [97.5  F (36.4  C)-98.2  F (36.8  C)] 97.6  F (36.4  C)  Pulse:  [58-84] 61  Resp:  [16-28] 16  BP: (150-182)/(72-86) 150/72  SpO2:  [96 %-98 %] 97 %    Neurologic  Mental Status:  alert, oriented x 3, follows commands, speech clear and fluent, occasional WFD  Cranial Nerves:  visual fields intact, PERRL, EOMI with normal smooth pursuit, facial sensation intact and symmetric, hearing not formally tested but intact to conversation, tongue protrusion midline, L NLF flattening, mild dysarthria  Motor:  normal muscle tone and bulk, no abnormal movements, able to move all limbs spontaneously, no pronator drift  Reflexes:  toes down-going  Sensory:  light touch sensation intact and symmetric throughout upper and lower extremities, no extinction on double simultaneous stimulation   Coordination:   FTN without dysmetria  Station/Gait:  deferred    Stroke Scales    NIHSS  1a. Level of Consciousness 0-->Alert, keenly responsive   1b. LOC Questions 0-->Answers both questions correctly   1c. LOC Commands 0-->Performs both tasks correctly   2.   Best Gaze 0-->Normal   3.   Visual 0-->No visual loss   4.   Facial Palsy 1-->Minor paralysis (flattened nasolabial fold, asymmetry on smiling)   5a. Motor Arm, Left 0-->No drift, limb holds 90 (or 45) degrees for full 10 secs   5b. Motor Arm, Right 0-->No drift, limb holds 90 (or 45) degrees for full 10 secs   6a. Motor Leg, Left 0-->No drift, leg  "holds 30 degree position for full 5 secs   6b. Motor Leg, right 0-->No drift, leg holds 30 degree position for full 5 secs   7.   Limb Ataxia 0-->Absent   8.   Sensory 0-->Normal, no sensory loss   9.   Best Language 1-->Mild-to-moderate aphasia, some obvious loss of fluency or facility of comprehension, without significant limitation on ideas expressed or form of expression. Reduction of speech and/or comprehension, however, makes conversation. . . (see row details)   10. Dysarthria 1-->Mild-to-moderate dysarthria, patient slurs at least some words and, at worst, can be understood with some difficulty   11. Extinction and Inattention  0-->No abnormality   Total 3 (03/08/24 1642)       Imaging  I personally reviewed all imaging; relevant findings per HPI.    Labs Data   CBC  Recent Labs   Lab 03/07/24  0741   WBC 6.7   RBC 4.10*   HGB 12.7*   HCT 37.4*        Basic Metabolic Panel   Recent Labs   Lab 03/08/24  0558 03/07/24  2113 03/07/24  1524 03/07/24  0741   NA  --   --   --  140   POTASSIUM  --   --   --  4.4   CHLORIDE  --   --   --  104   CO2  --   --   --  26   BUN  --   --   --  19.5   CR  --   --   --  1.05   * 233* 150* 132*   MAURICIO  --   --   --  8.6     Liver Panel  No results for input(s): \"PROTTOTAL\", \"ALBUMIN\", \"BILITOTAL\", \"ALKPHOS\", \"AST\", \"ALT\", \"BILIDIRECT\" in the last 168 hours.  INR    Recent Labs   Lab Test 03/07/24  0741 08/01/22  0939   INR 1.35* 1.37*           Stroke Consult Data Data   This was a non-emergent, non-telestroke consult.  I have personally spent a total of 35 minutes providing care today, time spent in reviewing medical records and devising the plan as recorded above.    "

## 2024-03-08 NOTE — PLAN OF CARE
OT: Order received, chart reviewed and discussed with care team. Per PT, patient is modified independent with FWW and has no remaining neuro symptoms. Patient has all necessary equipment at home and will have supportive spouse upon discharge. OT not indicated due to having no acute care OT needs. Defer discharge recommendations to care team. Will complete OT orders.

## 2024-03-08 NOTE — PLAN OF CARE
Vitals: VSS on RA  Neuros: AOx4, slightly aphasic and intermittent slurred speech. BLE 4/5, BUE 5/5  IV: PIV SL  Resp/trach: LS diminished  Diet: Mod carb diet, BS ACHS  Bowel status: LBM 3/6l BS+  : Voiding with frequency and urgency  Skin: Blanchable redness to coccyx  Pain: Denies  Activity: SBA, gb  Social: Wife to  patient when discharge orders come through, please call her when the orders are placed  Plan: Discharging home today.

## 2024-03-08 NOTE — CONSULTS
Care Management Initial Consult    General Information  Assessment completed with: Patient,    Type of CM/SW Visit: Initial Assessment    Primary Care Provider verified and updated as needed: Yes   Readmission within the last 30 days:  ([facility])      Reason for Consult: discharge planning  Advance Care Planning: Advance Care Planning Reviewed: no concerns identified          Communication Assessment  Patient's communication style: spoken language (English or Bilingual)    Hearing Difficulty or Deaf: no   Wear Glasses or Blind: yes    Cognitive  Cognitive/Neuro/Behavioral: WDL  Level of Consciousness: alert  Arousal Level: opens eyes spontaneously  Orientation: oriented x 4  Mood/Behavior: calm, cooperative  Best Language: 1 - Mild to moderate  Speech: slurred    Living Environment:   People in home: spouse     Current living Arrangements: apartment      Able to return to prior arrangements: yes       Family/Social Support:  Care provided by: self  Provides care for: no one  Marital Status:   Wife, Children          Description of Support System:           Current Resources:   Patient receiving home care services: No     Community Resources:    Equipment currently used at home: walker, rolling  Supplies currently used at home:      Employment/Financial:  Employment Status: retired     Employment/ Comments: [place]  Financial Concerns: none     Does the patient's insurance plan have a 3 day qualifying hospital stay waiver?  Yes     Which insurance plan 3 day waiver is available? Alternative insurance waiver    Will the waiver be used for post-acute placement? No    Lifestyle & Psychosocial Needs:  Social Determinants of Health     Food Insecurity: Not on file   Depression: Not on file   Housing Stability: Not on file   Tobacco Use: Medium Risk (8/1/2022)    Patient History     Smoking Tobacco Use: Former     Smokeless Tobacco Use: Unknown     Passive Exposure: Not on file   Financial Resource  Strain: Not on file   Alcohol Use: Not on file   Transportation Needs: Not on file   Physical Activity: Not on file   Interpersonal Safety: Not on file   Stress: Not on file   Social Connections: Not on file       Functional Status:  Prior to admission patient needed assistance:   Dependent ADLs:: Independent  Dependent IADLs:: Cleaning       Mental Health Status:          Chemical Dependency Status:                Values/Beliefs:  Spiritual, Cultural Beliefs, Yazidism Practices, Values that affect care:                 Additional Information:  CM consult for discharge planning.  Per chart review, patient admitted for left-sided acute ischemic stroke in the left caudate nucleus and left frontal lobe with expressive aphasia.  Met with patient.  He lives in an apartment with his wife.  They do not have any services but have help with housekeeping and they get food from a food shelf that is delivered to their apartment building twice a month.  They both drive.  He does have a son to help with transportation if needed.  He agrees with discharging to home with OP therapies.  [facility] but would like appointment scheduled if possible.  He stated either his wife or son would provide transportation home.    Addendum @ 1228:  Unable to get appointment in the next 1-2 weeks with patient's PCP.  He prefers a morning appointment because [facility].  The  sent a message to patient's providers nurse and they will contact patient with appointment.    Dang Richards RN, BSN, PHN  Inpatient Care Coordination  [facility]  Phone: [phone]

## 2024-03-08 NOTE — PROGRESS NOTES
Hospitalist Progress Note    Interval History   Patient awake and alert.  No acute events overnight.  Feels like he is back to baseline.  Was wondering about discharge.    -Data reviewed today: I reviewed all new labs and imaging results over the last 24 hours. I personally reviewed the CT chest abdomen pelvis image(s) showing no evidence of occult malignancy .  Review report for complete details    Physical Exam   Temp: 98.1  F (36.7  C) Temp src: Oral BP: 105/65 Pulse: 104   Resp: 16 SpO2: 96 % O2 Device: None (Room air)    Vitals:    03/07/24 0742   Weight: 76.1 kg (167 lb 12.3 oz)     Vital Signs with Ranges  Temp:  [97.5  F (36.4  C)-98.1  F (36.7  C)] 98.1  F (36.7  C)  Pulse:  [] 104  Resp:  [16-17] 16  BP: (105-172)/(65-86) 105/65  SpO2:  [95 %-97 %] 96 %  I/O last 3 completed shifts:  In: 520 [P.O.:520]  Out: -     Physical Exam  Constitutional:       Appearance: Normal appearance.      Comments: Old and frail   Cardiovascular:      Rate and Rhythm: Normal rate and regular rhythm.      Pulses: Normal pulses.      Heart sounds: Normal heart sounds.   Pulmonary:      Effort: Pulmonary effort is normal. No respiratory distress.      Breath sounds: Normal breath sounds.   Abdominal:      General: Abdomen is flat. Bowel sounds are normal. There is no distension.      Tenderness: There is no abdominal tenderness. There is no guarding.   Skin:     General: Skin is warm and dry.   Neurological:      General: No focal deficit present.      Comments: Continues to have slightly slurred speech which is his baseline           Medications    - MEDICATION INSTRUCTIONS -        apixaban ANTICOAGULANT  5 mg Oral BID    aspirin  81 mg Oral Daily    atorvastatin  10 mg Oral Daily    insulin aspart  1-7 Units Subcutaneous TID AC    insulin aspart  1-5 Units Subcutaneous At Bedtime    tamsulosin  0.8 mg Oral QPM       Data   Recent Labs   Lab 03/08/24  1151 03/08/24  0540  03/07/24 2113 03/07/24  1524 03/07/24  0741   WBC  --   --   --   --  6.7   HGB  --   --   --   --  12.7*   MCV  --   --   --   --  91   PLT  --   --   --   --  157   INR  --   --   --   --  1.35*   NA  --   --   --   --  140   POTASSIUM  --   --   --   --  4.4   CHLORIDE  --   --   --   --  104   CO2  --   --   --   --  26   BUN  --   --   --   --  19.5   CR  --   --   --   --  1.05   ANIONGAP  --   --   --   --  10   MAURICIO  --   --   --   --  8.6   * 126* 233*   < > 132*    < > = values in this interval not displayed.       Recent Results (from the past 24 hour(s))   CT Chest/Abdomen/Pelvis w Contrast    Narrative    EXAM: CT CHEST/ABDOMEN/PELVIS W CONTRAST  LOCATION: [facility]  DATE: 3/7/2024    INDICATION: strokes despite apixaban  COMPARISON: CT of the chest, abdomen, and pelvis 08/02/2022  TECHNIQUE: CT scan of the chest, abdomen, and pelvis was performed following injection of IV contrast. Multiplanar reformats were obtained. Dose reduction techniques were used.   CONTRAST: 84 mL Isovue-370    FINDINGS:   LUNGS AND PLEURA: Slight elevation of the right hemidiaphragm and adjacent linear band of atelectasis in the anterior right lower lobe. No airspace opacities. No interlobular septal thickening. Trachea and central airways are patent. No bronchial wall   thickening or endoluminal airway debris. No pleural effusions.    MEDIASTINUM: Cardiac chambers are normal in size. No pericardial effusion. Main pulmonary artery is normal caliber. No pulmonary artery filling defects. Nonaneurysmal thoracic aorta. Conventional arch anatomy. The maximal diameter of the proximal   descending thoracic aorta is 3.6 cm. Mild mixed attenuation atheroma in the proximal great vessels and moderate mixed attenuation plaque throughout the descending thoracic aorta. No enlarged mediastinal or hilar lymph nodes. Small hiatal hernia.   Esophagus is decompressed. Imaged thyroid gland is normal.    CORONARY  ARTERY CALCIFICATION: Severe.    HEPATOBILIARY: The liver has a somewhat lobulated border. No focal liver lesions. Gallbladder and bile ducts are normal.    PANCREAS: Normal.    SPLEEN: Normal.    ADRENAL GLANDS: Normal.    KIDNEYS/BLADDER: There are numerous bladder diverticula. Kidneys are normal in size with symmetric parenchymal enhancement and normal cortex thickness. No hydronephrosis or hydroureter.    BOWEL: Diverticulosis of the colon. No acute inflammatory change. No obstruction.     LYMPH NODES: Normal.    VASCULATURE: Moderate patchy aortoiliac atheromatous calcifications. No aneurysm.    PELVIC ORGANS: Moderate to severe enlargement of the prostate gland which measures 5.8 cm transverse and indents the bladder base.    MUSCULOSKELETAL: Reciprocal curvature of the lumbar spine with internal convexity marginal osteophytes, vacuum disc phenomenon and discogenic sclerosis. No fractures or bone lesions.      Impression    IMPRESSION:    1.  No focal inflammatory process in the chest, abdomen, or pelvis.  2.  Severe atheromatous coronary calcifications.  3.  Diverticulosis of the distal colon but no diverticulitis.  4.  Moderate to severe enlargement of the prostate gland which indents the bladder base.         Assessment & Plan      Nathan Macias is a 89 year old male with a history of atrial fibrillation on chronic anticoagulation with apixaban, type 2 diabetes, BPH, hyperlipidemia who presents via EMS with complaints of significant speech difficulties.  Patient felt fine when he went to bed last night but started noticing difficulty with his speech when he woke up this morning.  He had trouble getting his words out.  Denies any recent head trauma, fever or chills or any infectious etiology.  Denies any chest pain or lightheadedness.  His wife did report that he had significant aphasia.  There was no difficulty with his gait.  His wife noted slurring of his words at 650 this morning.  EMS was called  and he was found to be hypertensive with systolic blood pressures in the 170s and he was brought to the hospital where code stroke was called.  By the time I evaluated the patient his symptoms were almost completely resolved.  He continues to have some mild expressive aphasia with word finding difficulties.     #1 left-sided acute ischemic stroke in the left caudate nucleus and left frontal lobe with expressive aphasia  Previous history of right cerebellar stroke  -Concern for possible ischemic stroke.  -Code stroke was called and neurostroke team evaluated patient.  -CTA head and neck was done which showed no large vessel occlusion, no significant stenosis throughout major neck arteries.  No dissection.  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  -CT head without contrast showed no acute stroke.  -MRI brain with and without contrast showed recent tiny ischemic infarct in the left caudate nucleus and left frontal lobe.  -Patient is being admitted under observation to complete stroke workup.  -Started on aspirin 81 daily.  Recommend for a 90-day course.  -Continue home DOAC apixaban 5 mg twice daily.  -Continue atorvastatin 10 mg daily.  Lipid panel ordered and pending.  -Allow for permissive hypertension up to .  Holding home antihypertensives for now.  -PT/OT/speech consulted.  Cleared him for discharge with possible outpatient therapy.  Speech therapy cleared him for regular diet.  -Echocardiogram ordered.  Still not completed today.  -Continue cardiac monitoring.  -Stroke neurology team consulted.  -Carotid ultrasound shows less than 50% stenosis in the right and left ICA  -CT chest abdomen pelvis done with contrast which did not show any occult malignancy.  Does show severe coronary calcifications with moderate to severe enlargement of the prostate gland  -0 awaiting final recommendations from neurology.     2.  Type 2 diabetes  mellitus  Hyperlipidemia  -metformin 500 mg daily at home.    -HbA1c ordered.  -Ordered for carb controlled diet once he passes bedside swallow evaluation.  -Sliding scale insulin aspart.  Holding metformin.  -Lipid panel showed LDL of 41 with triglycerides of 70.  Low LDL puts patient at risk for intracranial bleed.  Will let neurology decide on whether patient should be on statin or not.     3.  BPH with history of urinary retention-on tamsulosin 0.8 mg daily     4.  Paroxysmal atrial fibrillation-patient is currently in sinus rhythm.  On apixaban.  Continue with cardiac monitoring.  Holding metoprolol to allow for permissive hypertension.     #5 history of hospital induced delirium-  -was treated with Seroquel in the past.  Not currently on it.  -High risk for delirium while in hospital.  Maintain wake sleep cycles.         Clinically Significant Risk Factors Present on Admission               # Drug Induced Coagulation Defect: home medication list includes an anticoagulant medication        # DMII: A1C = 6.8 % (Ref range: <5.7 %) within past 6 months        # Financial/Environmental Concerns: none          DVT Prophylaxis: Pneumatic Compression Devices  Code Status: Full Code  Disposition: Expected discharge either later today or tomorrow      Lindsay Lou MD, MD  [phone](p)

## 2024-03-08 NOTE — PHARMACY-CONSULT NOTE
Pharmacy Consult to evaluate for medication related stroke core measures    Nathan Macias, 89 year old male admitted for speech difficulty on 3/7/2024.    Thrombolytic was not given because of Time from onset contraindications, Clinical contraindications, Laboratory contraindications    VTE Prophylaxis SCDs/PCDs were ordered on 3/7/2024, as appropriate prior to end of hospital day 2.    Antithrombotic: aspirin started on 3/7/2024, as appropriate by end of hospital day 2. Continue antithrombotic therapy on discharge to meet quality measures, unless contraindicated.    Anticoagulation if history of A-fib/flutter: Patient on apixaban (Eliquis); continue anticoagulation on discharge to meet quality measures, unless contraindicated.    LDL Cholesterol Calculated   Date Value Ref Range Status   03/07/2024 41 <=100 mg/dL Final       Patient's home statin, Lipitor (atorvastatin) restarted; continue statin on discharge to meet quality measures, unless contraindicated.     Recommendations: None at this time    Thank you for the consult.    Nubia Viveros, PharmD 3/8/2024 9:09 AM

## 2024-03-08 NOTE — DISCHARGE INSTRUCTIONS
You will need to follow up with your primary care provider Dr. Swann at the [facility] in about a week.  They do not have any appointments available at this time.  A message has been sent to Dr. Swann's nurse and you will be contacted with an appointment.  A morning appointment was requested so you may get [facility].  If you do not receive a call from the clinic by Monday 3/11 please call primary care scheduling [phone].

## 2024-03-08 NOTE — PROGRESS NOTES
3/7/2024 [id number]     Reason for Admission: stroke    trauma/Ortho/Medical (Choose one) Medical    Diagnosis: Stroke    Mental Status:Alert and oriented x 4    Activity/dangle Assist of 1 with gait belt and walker    Diet: Regular diet    Pain:Denied having any pain    [place]/Voiding:Continent of bowl & bladder    Tele/Restraints/Iso:SR/BBB    VS: Stable on RA except hypertension    02/LDA: PIV SL    Other Info: Lung sound clear, aggression tool green, denies having any pain or discomfort.

## 2024-03-09 ENCOUNTER — APPOINTMENT (OUTPATIENT)
Dept: CARDIOLOGY | Facility: CLINIC | Age: 89
End: 2024-03-09
Attending: NURSE PRACTITIONER
Payer: COMMERCIAL

## 2024-03-09 LAB
BI-PLANE LVEF ECHO: NORMAL
GLUCOSE BLDC GLUCOMTR-MCNC: 138 MG/DL (ref 70–99)
GLUCOSE BLDC GLUCOMTR-MCNC: 149 MG/DL (ref 70–99)
GLUCOSE BLDC GLUCOMTR-MCNC: 173 MG/DL (ref 70–99)
GLUCOSE BLDC GLUCOMTR-MCNC: 176 MG/DL (ref 70–99)
GLUCOSE BLDC GLUCOMTR-MCNC: 188 MG/DL (ref 70–99)

## 2024-03-09 PROCEDURE — 250N000013 HC RX MED GY IP 250 OP 250 PS 637: Performed by: HOSPITALIST

## 2024-03-09 PROCEDURE — 255N000002 HC RX 255 OP 636: Performed by: HOSPITALIST

## 2024-03-09 PROCEDURE — G0378 HOSPITAL OBSERVATION PER HR: HCPCS

## 2024-03-09 PROCEDURE — 99203 OFFICE O/P NEW LOW 30 MIN: CPT | Performed by: INTERNAL MEDICINE

## 2024-03-09 PROCEDURE — 999N000208 ECHOCARDIOGRAM COMPLETE

## 2024-03-09 PROCEDURE — 250N000013 HC RX MED GY IP 250 OP 250 PS 637: Performed by: NURSE PRACTITIONER

## 2024-03-09 PROCEDURE — 99232 SBSQ HOSP IP/OBS MODERATE 35: CPT | Performed by: HOSPITALIST

## 2024-03-09 PROCEDURE — 93306 TTE W/DOPPLER COMPLETE: CPT | Mod: 26 | Performed by: INTERNAL MEDICINE

## 2024-03-09 PROCEDURE — 82962 GLUCOSE BLOOD TEST: CPT

## 2024-03-09 RX ORDER — METOPROLOL SUCCINATE 25 MG/1
25 TABLET, EXTENDED RELEASE ORAL DAILY
Status: DISCONTINUED | OUTPATIENT
Start: 2024-03-09 | End: 2024-03-10 | Stop reason: HOSPADM

## 2024-03-09 RX ADMIN — INSULIN ASPART 1 UNITS: 100 INJECTION, SOLUTION INTRAVENOUS; SUBCUTANEOUS at 12:22

## 2024-03-09 RX ADMIN — APIXABAN 5 MG: 5 TABLET, FILM COATED ORAL at 22:25

## 2024-03-09 RX ADMIN — INSULIN ASPART 1 UNITS: 100 INJECTION, SOLUTION INTRAVENOUS; SUBCUTANEOUS at 17:33

## 2024-03-09 RX ADMIN — HUMAN ALBUMIN MICROSPHERES AND PERFLUTREN 3 ML: 10; .22 INJECTION, SOLUTION INTRAVENOUS at 09:50

## 2024-03-09 RX ADMIN — ATORVASTATIN CALCIUM 10 MG: 10 TABLET, FILM COATED ORAL at 22:25

## 2024-03-09 RX ADMIN — ASPIRIN 81 MG CHEWABLE TABLET 81 MG: 81 TABLET CHEWABLE at 08:01

## 2024-03-09 RX ADMIN — METOPROLOL SUCCINATE 25 MG: 25 TABLET, EXTENDED RELEASE ORAL at 12:22

## 2024-03-09 RX ADMIN — TAMSULOSIN HYDROCHLORIDE 0.8 MG: 0.4 CAPSULE ORAL at 22:25

## 2024-03-09 RX ADMIN — INSULIN ASPART 1 UNITS: 100 INJECTION, SOLUTION INTRAVENOUS; SUBCUTANEOUS at 08:01

## 2024-03-09 RX ADMIN — APIXABAN 5 MG: 5 TABLET, FILM COATED ORAL at 08:01

## 2024-03-09 ASSESSMENT — ACTIVITIES OF DAILY LIVING (ADL)
ADLS_ACUITY_SCORE: 31
ADLS_ACUITY_SCORE: 27
ADLS_ACUITY_SCORE: 31
ADLS_ACUITY_SCORE: 27
ADLS_ACUITY_SCORE: 31
ADLS_ACUITY_SCORE: 27
ADLS_ACUITY_SCORE: 31
ADLS_ACUITY_SCORE: 31

## 2024-03-09 NOTE — PLAN OF CARE
Goal Outcome Evaluation:      Plan of Care Reviewed With: patient    Overall Patient Progress: improvingOverall Patient Progress: improving    Pt here with L ischemic CVA. A&O x4. Neuros stabled with slightly slurred speech, generalized weakness. VSS on RA. Tele SR with BBB. MOD CHO diet, thin liquids. Takes pills whole. Up with SBA with GB/W. Denies pain. Pt scoring green on the Aggression Stop Light Tool. Plan for ECHO today. Discharge today back to home with outpatient therapy.

## 2024-03-09 NOTE — PROGRESS NOTES
"Brief Stroke Note:     TTE completed. Demonstrates reduced EF of 38% (previously 55%). No further stroke evaluation indicated at this time, see note 3/8 for complete recommendations. Defer further management of TTE findings to primary team/cardiology.     Stroke neurology will sign off.     MARY Corado, [facility]  Neurology  03/09/2024 1:41 PM  To page stroke neurology after hours or on a subsequent day, click here: [facility]  Choose \"On Call\" tab at top, then search dropdown box for \"Neurology Adult\" & press Enter, look for Neuro ICU/Stroke       "

## 2024-03-09 NOTE — DISCHARGE SUMMARY
[facility]    Discharge Summary  Hospitalist    Date of Admission:  3/7/2024  Date of Discharge:  3/10/2024    Discharge Diagnoses      Acute ischemic stroke (H)  Anticoagulated  History of stroke  Elevated troponin  Cerebrovascular accident (CVA), unspecified mechanism (H)  Cardiomyopathy, unspecified type (H)    History of Present Illness   Nathan Macias is an 89 year old male who presented with acute stroke    Hospital Course   Nathan Macias was admitted on 3/7/2024.  The following problems were addressed during his hospitalization:    Nathan Macias is a 89 year old male with a history of atrial fibrillation on chronic anticoagulation with apixaban, type 2 diabetes, BPH, hyperlipidemia who presents via EMS with complaints of significant speech difficulties.  Patient felt fine when he went to bed last night but started noticing difficulty with his speech when he woke up this morning.  He had trouble getting his words out.  Denies any recent head trauma, fever or chills or any infectious etiology.  Denies any chest pain or lightheadedness.  His wife did report that he had significant aphasia.  There was no difficulty with his gait.  His wife noted slurring of his words at 650 this morning.  EMS was called and he was found to be hypertensive with systolic blood pressures in the 170s and he was brought to the hospital where code stroke was called.  By the time I evaluated the patient his symptoms were almost completely resolved.  He continues to have some mild expressive aphasia with word finding difficulties.     #1 left-sided acute ischemic stroke in the left caudate nucleus and left frontal lobe with expressive aphasia  Previous history of right cerebellar stroke  -Concern for possible ischemic stroke.  -Code stroke was called and neurostroke team evaluated patient.  -CTA head and neck was done which showed no large vessel occlusion, no significant stenosis  throughout major neck arteries.  No dissection.  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  -CT head without contrast showed no acute stroke.  -MRI brain with and without contrast showed recent tiny ischemic infarct in the left caudate nucleus and left frontal lobe.  -Patient is being admitted under observation to complete stroke workup.  -Started on aspirin 81 daily.  Recommend for a 90-day course.  -Continue home DOAC apixaban 5 mg twice daily.  -Continue atorvastatin 10 mg daily.  Lipid panel showed LDL of 41.  -Allow for permissive hypertension up to .  Holding home antihypertensives for now.  -PT/OT/speech consulted.  Cleared him for discharge with possible outpatient therapy.  Speech therapy cleared him for regular diet.  Patient is not able to make it to outpatient therapy.  Ordered home care with home PT OT.  -Echocardiogram ordered.  Completed .  Review below.  -Continue cardiac monitoring.  -Stroke neurology team consulted.  -Carotid ultrasound shows less than 50% stenosis in the right and left ICA  -CT chest abdomen pelvis done with contrast which did not show any occult malignancy.  Does show severe coronary calcifications with moderate to severe enlargement of the prostate gland  - neurology cleared for discharge.    2. Reduced EF with systolic dysfunction   -Echocardiogram done as a part of stroke workup showed significantly reduced EF of 35%.  Patient has baseline atrial fibrillation and has been intermittently tachycardic.  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  -Troponin trend showed 24---23---23.  Denied any active chest pain or shortness of breath.  -Cardiology consulted.  -Restarted Toprol-XL 25 mg daily.  Was initially held to allow for permissive hypertension.  - clinically euvolemic   -Will defer the rest of goal-directed medical therapy to cardiology team.     2.  Type 2 diabetes mellitus  Hyperlipidemia  -metformin 500  mg daily at home.    -HbA1c ordered.  -Ordered for carb controlled diet once he passes bedside swallow evaluation.  -Sliding scale insulin aspart.  Holding metformin.  -Lipid panel showed LDL of 41 with triglycerides of 70.  Continue atorvastatin 10 mg daily     3.  BPH with history of urinary retention-on tamsulosin 0.8 mg daily     4.  Paroxysmal atrial fibrillation-patient is currently in sinus rhythm.  On apixaban.  Continue with cardiac monitoring.  Restarted Toprol-XL 25 mg daily     #5 history of hospital induced delirium-  -was treated with Seroquel in the past.  Not currently on it.  -High risk for delirium while in hospital.  Maintain wake sleep cycles.  No episodes of delirium during this hospital stay          Clinically Significant Risk Factors Present on Admission               # Drug Induced Coagulation Defect: home medication list includes an anticoagulant medication        # DMII: A1C = 6.8 % (Ref range: <5.7 %) within past 6 months        # Financial/Environmental Concerns: none          Lindsay Lou MD, MD        Code Status   Full Code       Primary Care Physician   GEORGE MARTINEZ    Physical Exam   Temp: 97.8  F (36.6  C) Temp src: Oral BP: 139/70 Pulse: 67   Resp: 16 SpO2: 94 % O2 Device: None (Room air)    Vitals:    03/07/24 0742   Weight: 76.1 kg (167 lb 12.3 oz)     Vital Signs with Ranges  Temp:  [97.6  F (36.4  C)-97.8  F (36.6  C)] 97.8  F (36.6  C)  Pulse:  [67-72] 67  Resp:  [15-16] 16  BP: (139-148)/(70-84) 139/70  SpO2:  [94 %-96 %] 94 %  I/O last 3 completed shifts:  In: 480 [P.O.:480]  Out: -     Physical Exam  Constitutional:       Appearance: Normal appearance.      Comments: Old and frail    Cardiovascular:      Rate and Rhythm: Normal rate and regular rhythm.      Pulses: Normal pulses.      Heart sounds: Normal heart sounds.   Pulmonary:      Effort: Pulmonary effort is normal. No respiratory distress.      Breath sounds: Normal breath sounds.   Abdominal:      General:  Abdomen is flat. Bowel sounds are normal. There is no distension.      Tenderness: There is no abdominal tenderness. There is no guarding.   Skin:     General: Skin is warm and dry.   Neurological:      General: No focal deficit present.           Discharge Disposition   Discharged to home  Condition at discharge: Stable    Consultations This Hospital Stay   NEUROLOGY IP STROKE CONSULT  SPEECH LANGUAGE PATH ADULT IP CONSULT  PHARMACY IP CONSULT  PHARMACY IP CONSULT  PHARMACY IP CONSULT  PHYSICAL THERAPY ADULT IP CONSULT  OCCUPATIONAL THERAPY ADULT IP CONSULT  REHAB ADMISSIONS LIAISON IP CONSULT  CARE MANAGEMENT / SOCIAL WORK IP CONSULT  CARDIOLOGY IP CONSULT  SMOKING CESSATION PROGRAM IP CONSULT    Time Spent on this Encounter   I, Lindsay Lou MD, personally saw the patient today and spent greater than 30 minutes discharging this patient.    Discharge Orders      Home Care Referral      Follow-Up with Cardiology SILVINA      Reason for your hospital stay    Acute stroke     Follow-up and recommended labs and tests     Follow up with primary care provider, GEORGE MARTINEZ, within 7 days for hospital follow- up.  No follow up labs or test are needed.  Follow up with stroke neurology in 4 weeks     Activity    Your activity upon discharge: activity as tolerated     Echocardiogram Complete    Administration of IV contrast will be tailored to this examination per the appropriate written protocol listed in the Echocardiography department Protocol Book, or by the supervising Cardiologist. This may result in an order change.    Use of contrast is at the discretion of the supervising Cardiologist.     Diet    Follow this diet upon discharge: Orders Placed This Encounter      Combination Diet Thin Liquids (level 0); Moderate Consistent Carb (60 g CHO per Meal) Diet; Regular Diet     Discharge Medications   Discharge Medication List as of 3/10/2024 10:30 AM        START taking these medications    Details   aspirin (ASA) 81  MG chewable tablet Take 1 tablet (81 mg) by mouth daily, Disp-90 tablet, R-0, E-Prescribe           CONTINUE these medications which have NOT CHANGED    Details   apixaban ANTICOAGULANT (ELIQUIS) 5 MG tablet Take 5 mg by mouth 2 times daily, Historical      atorvastatin (LIPITOR) 10 MG tablet Take 10 mg by mouth at bedtime, Historical      metFORMIN (GLUCOPHAGE XR) 500 MG 24 hr tablet Take 500 mg by mouth daily (with breakfast), Historical      metoprolol succinate ER (TOPROL XL) 25 MG 24 hr tablet Take 25 mg by mouth daily, Historical      tamsulosin (FLOMAX) 0.4 MG capsule Take 0.8 mg by mouth every evening, Historical           Allergies   No Known Allergies  Data   Recent Labs   Lab Test 03/07/24  0741 10/22/23  1313 08/21/22  0902 08/18/22  0218 08/02/22  0747 08/01/22  0939   WBC 6.7  --  7.0 9.0   < > 5.6   HGB 12.7* 10.9* 12.8* 13.1*   < > 11.0*   MCV 91  --  94 94   < > 92     --  232 226   < > 147*   INR 1.35*  --   --   --   --  1.37*    < > = values in this interval not displayed.      Recent Labs   Lab Test 03/10/24  0751 03/09/24  2147 03/09/24  1652 03/07/24  1524 03/07/24  0741 08/29/22  1219 08/29/22  0837 08/21/22  1227 08/21/22  0902 08/18/22  0841 08/18/22  0218   NA  --   --   --   --  140  --   --   --  141  --  140   POTASSIUM  --   --   --   --  4.4  --   --   --  4.7  --  4.7   CHLORIDE  --   --   --   --  104  --   --   --  110*  --  109   CO2  --   --   --   --  26  --   --   --  26  --  25   BUN  --   --   --   --  19.5  --   --   --  67*  --  66*   CR  --   --   --   --  1.05  --  1.13  --  1.11  --  1.42*   ANIONGAP  --   --   --   --  10  --   --   --  5  --  6   MAURICIO  --   --   --   --  8.6  --   --   --  8.4*  --  8.7   * 173* 176*   < > 132*   < >  --    < > 171*   < > 180*    < > = values in this interval not displayed.         Results for orders placed or performed during the hospital encounter of 03/07/24   CT Head w/o Contrast    Narrative    CT SCAN OF THE HEAD  WITHOUT CONTRAST   3/7/2024 7:52 AM     HISTORY: Code Stroke to evaluate for potential thrombolysis and  thrombectomy. Slurred speech.    TECHNIQUE:  Axial images of the head and coronal reformations without  IV contrast material. Radiation dose for this scan was reduced using  automated exposure control, adjustment of the mA and/or kV according  to patient size, or iterative reconstruction technique.    COMPARISON: 10/22/2023.    FINDINGS: There is no evidence of intracranial hemorrhage, mass, acute  territorial infarct or anomaly. Chronic right cerebellar and left  frontal infarcts. The ventricles are normal in size, shape and  configuration. Mild diffuse parenchymal volume loss. Mild patchy  periventricular white matter hypodensities which are nonspecific, but  likely related to chronic microvascular ischemic disease. Partially  empty sella.    Scattered paranasal sinus mucosal thickening. No mastoid or middle ear  effusion. The bony calvarium and bones of the skull base appear  intact.       Impression    IMPRESSION: No evidence of an acute territorial infarction.    Dr. Tilley was contacted by me on 3/7/2024 8:09 AM to discuss  findings.     GUILHERME GALARZA MD         SYSTEM ID:  [id number]   CTA Head Neck with Contrast    Narrative    CT ANGIOGRAM OF THE HEAD AND NECK WITH CONTRAST  3/7/2024 7:57 AM     HISTORY: Code Stroke. Evaluate for potential thrombolysis and  thrombectomy. Slurred speech.    TECHNIQUE:  CT angiography with an injection of 67 mL Isovue-370 IV  with scans through the head and neck. Images were transferred to a  separate 3-D workstation where multiplanar reformations and 3-D images  were created. Estimates of carotid stenoses are made relative to the  distal internal carotid artery diameters except as noted. Radiation  dose for this scan was reduced using automated exposure control,  adjustment of the mA and/or kV according to patient size, or iterative  reconstruction technique.       COMPARISON: 8/1/2022.     CT HEAD FINDINGS: No contrast enhancing lesions. Cerebral blood flow  is grossly normal.     CT ANGIOGRAM HEAD FINDINGS: Bilateral carotid siphon atherosclerotic  calcifications without significant stenosis. The major intracranial  arteries including the proximal branches of the anterior cerebral,  middle cerebral, and posterior cerebral arteries appear patent without  vascular cutoff. No aneurysm identified. No significant stenosis.  Unchanged aplastic versus chronically occluded left A1 segment with  suspected azygos anterior cerebral artery fed by the right A1 segment.  Normal variant fetal origin of the right posterior cerebral artery.  Venous circulation is unremarkable.     CT ANGIOGRAM NECK FINDINGS: Scattered atherosclerotic calcification in  the aortic arch without significant stenosis at the origins of the  great vessels.     Right carotid artery: The right common and internal carotid arteries  are patent. Mild atherosclerotic disease at the carotid bifurcation  and proximal internal carotid artery without significant stenosis by  NASCET criteria.     Left carotid artery: The left common and internal carotid arteries are  patent. Mild atherosclerotic disease at the carotid bifurcation and  proximal internal carotid artery without significant stenosis by  NASCET criteria.     Vertebral arteries: Vertebral arteries are patent without evidence of  dissection. Unchanged diminutive caliber of the left vertebral artery  V4 segment after it gives off the posterior inferior cerebellar  artery.     Other findings: Multilevel cervical spine degenerative changes.      Impression    IMPRESSION:  1. No acute large vessel occlusion throughout the [place]  arteries.  2. No significant stenosis throughout the major neck arteries. No  dissection.  3. 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  4. Unchanged diminutive caliber of the left vertebral  artery V4  segment, favored to be congenital.    Dr. Tilley was contacted by me on 3/7/2024 8:09 AM to discuss  findings.      GUILHERME GALARZA MD         SYSTEM ID:  [id number]   CT Head Perfusion w Contrast - For Tier 2 Stroke    Narrative    CT BRAIN PERFUSION  3/7/2024 8:14 AM    HISTORY: Code Stroke. Evaluate for potential thrombolysis and  thrombectomy. Evaluate mismatch between penumbra and core infarct.  Slurred speech.    TECHNIQUE: Time sequential axial CT images of the head were acquired  during the administration of 50 mL Isovue-370 IV. Color perfusion maps  of the brain were created from this time sequential axial source data.      Radiation dose for this scan was reduced using automated exposure  control, adjustment of the mA and/or kV according to patient size, or  iterative reconstruction technique.    COMPARISON: None.    FINDINGS: No evidence of infarct. On RAPID analysis, there are small  regions of Tmax>6.0s in the bilateral occipital lobes and the left  frontal lobe totaling 17 mL. No corresponding abnormality on  noncontrast CT. On qualitative imaging, there is subtle decreased  cerebral blood flow, most notably to the cranial aspect of the left  cerebral hemisphere, although cerebral blood volume appears relatively  symmetric. Corresponding increased mean transit time, Tmax, and time  to drain involving areas of the left OMKAR, MCA, and OMKAR territories.  These perfusion abnormalities appear overall similar compared to  8/1/2022 and are favored to reflect chronic changes.      Impression    IMPRESSION: No findings to suggest an acute infarct or penumbra.    GUILHERME GALARZA MD         SYSTEM ID:  [id number]   MR Brain w/o & w Contrast    Narrative    MRI OF THE BRAIN WITHOUT AND WITH CONTRAST 3/7/2024 11:04 AM     COMPARISON: Head CT same day.    HISTORY:  Dysarthria, aphasia.    TECHNIQUE: Axial diffusion-weighted with ADC map, axial T2-weighted  with fat saturation, axial T1-weighted, axial  turboFLAIR and coronal  T1-weighted images of the brain were acquired without intravenous  contrast.  Following intravenous administration of gadolinium (8 mL  Gadavist), axial T1-weighted images of the brain were acquired.     FINDINGS: There are tiny recent ischemic infarcts in the cortex at the  lateral left frontal lobe and in the body of the caudate nucleus on  the left. No other recent infarcts.    There is moderate diffuse cerebral volume loss. There are numerous  scattered focal and patchy periventricular areas of abnormal T2 signal  hyperintensity in the cerebral white matter bilaterally that are  consistent with sequela of chronic small vessel ischemic disease. The  ventricles and basal cisterns are within normal limits in  configuration given the degree of cerebral volume loss.  There is no  midline shift.  There are no extra-axial fluid collections.  There is  no evidence for acute intracranial hemorrhage.      There is an area of chronic encephalomalacia in the superior aspect of  the right cerebral hemisphere demonstrating a few foci of abnormal  contrast enhancement likely representing a late subacute ischemic  infarct. There is no other abnormal contrast enhancement in the brain  or its coverings.     There is no sinusitis or mastoiditis.      Impression    IMPRESSION:   1. Recent tiny ischemic infarcts in the left caudate nucleus and left  frontal lobe.  2. Probable late subacute ischemic infarct in the superior aspect of  the right cerebral hemisphere again noted.  3. Diffuse cerebral volume loss and cerebral white matter changes  consistent with chronic small vessel ischemic disease.     JOSE F KEATING MD         SYSTEM ID:  [id number]   US Carotid Bilateral    Narrative    BILATERAL CAROTID ULTRASOUND   3/7/2024 12:26 PM     HISTORY: Better assess left carotid stenosis, new strokes.    COMPARISON: 3/24/2004, CTA 3/7/2024    RIGHT CAROTID FINDINGS:  Plaque in the common carotid and  carotid  bulb.  Right ICA PSV:  102 cm/sec.  Right ICA EDV:  34 cm/sec.  Right ICA/CCA PSV Ratio:  1.18    These indicate less than 50% diameter stenosis of the right ICA.    Right Vertebral: Antegrade flow.   Right ECA: Antegrade flow.     LEFT CAROTID FINDINGS:  Plaque in the common carotid and carotid bulb.  Left ICA PSV:  92 cm/sec.  Left ICA EDV: 17 cm/sec.  Left ICA/CCA PSV Ratio:  1.09    These indicate less than 50% diameter stenosis of the left ICA.    Left Vertebral: Antegrade flow.   Left ECA: Antegrade flow.     Causes of Decreased Accuracy:   None.       Impression    IMPRESSION:    1. Less than 50% diameter stenosis of the right ICA relative to the  distal ICA diameter.   2. Less than 50% diameter stenosis of the left ICA relative to the  distal ICA diameter.     MENDY ISLAS DO         SYSTEM ID:  [id number]   CT Chest/Abdomen/Pelvis w Contrast    Narrative    EXAM: CT CHEST/ABDOMEN/PELVIS W CONTRAST  LOCATION: [facility]  DATE: 3/7/2024    INDICATION: strokes despite apixaban  COMPARISON: CT of the chest, abdomen, and pelvis 08/02/2022  TECHNIQUE: CT scan of the chest, abdomen, and pelvis was performed following injection of IV contrast. Multiplanar reformats were obtained. Dose reduction techniques were used.   CONTRAST: 84 mL Isovue-370    FINDINGS:   LUNGS AND PLEURA: Slight elevation of the right hemidiaphragm and adjacent linear band of atelectasis in the anterior right lower lobe. No airspace opacities. No interlobular septal thickening. Trachea and central airways are patent. No bronchial wall   thickening or endoluminal airway debris. No pleural effusions.    MEDIASTINUM: Cardiac chambers are normal in size. No pericardial effusion. Main pulmonary artery is normal caliber. No pulmonary artery filling defects. Nonaneurysmal thoracic aorta. Conventional arch anatomy. The maximal diameter of the proximal   descending thoracic aorta is 3.6 cm. Mild mixed attenuation  atheroma in the proximal great vessels and moderate mixed attenuation plaque throughout the descending thoracic aorta. No enlarged mediastinal or hilar lymph nodes. Small hiatal hernia.   Esophagus is decompressed. Imaged thyroid gland is normal.    CORONARY ARTERY CALCIFICATION: Severe.    HEPATOBILIARY: The liver has a somewhat lobulated border. No focal liver lesions. Gallbladder and bile ducts are normal.    PANCREAS: Normal.    SPLEEN: Normal.    ADRENAL GLANDS: Normal.    KIDNEYS/BLADDER: There are numerous bladder diverticula. Kidneys are normal in size with symmetric parenchymal enhancement and normal cortex thickness. No hydronephrosis or hydroureter.    BOWEL: Diverticulosis of the colon. No acute inflammatory change. No obstruction.     LYMPH NODES: Normal.    VASCULATURE: Moderate patchy aortoiliac atheromatous calcifications. No aneurysm.    PELVIC ORGANS: Moderate to severe enlargement of the prostate gland which measures 5.8 cm transverse and indents the bladder base.    MUSCULOSKELETAL: Reciprocal curvature of the lumbar spine with internal convexity marginal osteophytes, vacuum disc phenomenon and discogenic sclerosis. No fractures or bone lesions.      Impression    IMPRESSION:    1.  No focal inflammatory process in the chest, abdomen, or pelvis.  2.  Severe atheromatous coronary calcifications.  3.  Diverticulosis of the distal colon but no diverticulitis.  4.  Moderate to severe enlargement of the prostate gland which indents the bladder base.   Echocardiogram Complete     Value    Biplane LVEF 38%    Narrative    [id number]  [id number]  [id number]^LURDES^ANAIS^ZINA     [facility]  Echocardiography Laboratory  [address] [place] [address]     Name: BREONNA FOSTER  MRN: [id number]  : 1933  Study Date: 2024 09:29 AM  Age: 89 yrs  Gender: Male  Patient Location: [facility]  Reason For Study: CVA  Ordering Physician: ANAIS CHATMAN  Referring  Physician: ANAIS CHATMAN  Performed By: [facility] Demetra Penaloza     BSA: 1.9 m2  Height: 70 in  Weight: 167 lb  HR: 95  BP: 150/72 mmHg  ______________________________________________________________________________  Procedure  Complete Portable Echo Adult. Technically difficult study.  ______________________________________________________________________________  Interpretation Summary     Mild concentric left ventricular hypertrophy.  Moderately decreased left ventricular systolic function  Biplane LVEF is 38%.  There is moderate global hypokinesia of the left ventricle.  Septal motion is consistent with conduction abnormality.  Grade 1 left ventricular diastolic function.  No significant valve disease.  Inferior vena cava not well visualized.     Compared to the previous study dated 8/2/2022, LVEF has decreased from 55% to  38%.     ______________________________________________________________________________  Left Ventricle  The left ventricle is normal in size. There is mild concentric left  ventricular hypertrophy. Moderately decreased left ventricular systolic  function. Biplane LVEF is 38%. Grade I or early diastolic dysfunction. There  is moderate global hypokinesia of the left ventricle. Septal motion is  consistent with conduction abnormality.     Right Ventricle  The right ventricle is normal in size and function.     Atria  Normal left atrial size. Right atrial size is normal. There is no atrial shunt  seen.     Mitral Valve  The mitral valve leaflets are mildly thickened. There is mild mitral annular  calcification. There is trace mitral regurgitation. There is no mitral valve  stenosis.     Tricuspid Valve  The tricuspid valve is not well visualized, but is grossly normal. There is  trace tricuspid regurgitation. The right ventricular systolic pressure is  approximated at 28.3 mmHg plus the right atrial pressure.     Aortic Valve  The aortic valve is trileaflet with aortic valve sclerosis. There is  trace  aortic regurgitation. No hemodynamically significant valvular aortic stenosis.     Pulmonic Valve  The pulmonic valve is not well visualized. There is trace pulmonic valvular  regurgitation. Normal pulmonic valve velocity.     Vessels  The aortic root is normal size. Normal size ascending aorta. Inferior vena  cava not well visualized for estimation of right atrial pressure.     Pericardium  There is no pericardial effusion.     Rhythm  Sinus rhythm was noted.  ______________________________________________________________________________  MMode/2D Measurements & Calculations  IVSd: 1.3 cm     LVIDd: 5.1 cm  LVIDs: 3.5 cm  LVPWd: 1.3 cm  FS: 32.0 %  LV mass(C)d: 263.2 grams  LV mass(C)dI: 136.1 grams/m2  Ao root diam: 3.1 cm  asc Aorta Diam: 3.7 cm  LVOT diam: 2.5 cm  LVOT area: 4.9 cm2  Ao root diam index Ht(cm/m): 1.7  Ao root diam index BSA (cm/m2): 1.6  Asc Ao diam index BSA (cm/m2): 1.9  Asc Ao diam index Ht(cm/m): 2.1     LA Volume Indexed (AL/bp): 27.2 ml/m2  RV Base: 2.1 cm  RWT: 0.50  TAPSE: 2.0 cm     Doppler Measurements & Calculations  MV E max rajan: 56.2 cm/sec  MV A max rajan: 93.6 cm/sec  MV E/A: 0.60  MV max P.5 mmHg  MV mean P.0 mmHg  MV V2 VTI: 23.9 cm  MVA(VTI): 3.0 cm2  MV dec time: 0.13 sec  LV V1 max PG: 3.5 mmHg  LV V1 max: 94.2 cm/sec  LV V1 VTI: 14.8 cm  SV(LVOT): 72.6 ml  SI(LVOT): 37.6 ml/m2  PA acc time: 0.10 sec  TR max rajan: 266.0 cm/sec  TR max P.3 mmHg  Pulm Sys Rajan: 62.2 cm/sec  Pulm Awan Arjan: 44.8 cm/sec  Pulm A Revs Rajan: 40.8 cm/sec  Pulm S/D: 1.4  E/E' av.0  Lateral E/e': 9.3     Medial E/e': 6.7  RV S Rajan: 18.8 cm/sec     ______________________________________________________________________________  Report approved by: Dr Mike Park 2024 10:58 AM

## 2024-03-09 NOTE — PROGRESS NOTES
[facility]    Hospitalist Progress Note    Interval History   Patient awake and alert.  No acute events overnight.  Denies any new neurologic deficits including.  Speech deficits have improved significantly since admission.    -Data reviewed today: I reviewed all new labs and imaging results over the last 24 hours. I personally reviewed the CT chest abdomen pelvis image(s) showing no evidence of occult malignancy .  Review report for complete details    Physical Exam   Temp: 98.4  F (36.9  C) Temp src: Oral BP: 128/74 Pulse: 107   Resp: 16 SpO2: 95 % O2 Device: None (Room air)    Vitals:    03/07/24 0742   Weight: 76.1 kg (167 lb 12.3 oz)     Vital Signs with Ranges  Temp:  [97.9  F (36.6  C)-98.4  F (36.9  C)] 98.4  F (36.9  C)  Pulse:  [] 107  Resp:  [16] 16  BP: (128-141)/(70-79) 128/74  SpO2:  [95 %-97 %] 95 %  I/O last 3 completed shifts:  In: 280 [P.O.:280]  Out: -     Physical Exam  Constitutional:       Appearance: Normal appearance.      Comments: Old and frail   Cardiovascular:      Rate and Rhythm: Normal rate and regular rhythm.      Pulses: Normal pulses.      Heart sounds: Normal heart sounds.   Pulmonary:      Effort: Pulmonary effort is normal. No respiratory distress.      Breath sounds: Normal breath sounds.   Abdominal:      General: Abdomen is flat. Bowel sounds are normal. There is no distension.      Tenderness: There is no abdominal tenderness. There is no guarding.   Skin:     General: Skin is warm and dry.   Neurological:      General: No focal deficit present.      Comments: Continues to have slightly slurred speech which is his baseline           Medications    - MEDICATION INSTRUCTIONS -        apixaban ANTICOAGULANT  5 mg Oral BID    aspirin  81 mg Oral Daily    atorvastatin  10 mg Oral Daily    insulin aspart  1-7 Units Subcutaneous TID AC    insulin aspart  1-5 Units Subcutaneous At Bedtime    metoprolol succinate ER  25 mg Oral Daily    tamsulosin  0.8  mg Oral QPM       Data   Recent Labs   Lab 24  1112 24  0715 24  0148 24  1524 24  0741   WBC  --   --   --   --  6.7   HGB  --   --   --   --  12.7*   MCV  --   --   --   --  91   PLT  --   --   --   --  157   INR  --   --   --   --  1.35*   NA  --   --   --   --  140   POTASSIUM  --   --   --   --  4.4   CHLORIDE  --   --   --   --  104   CO2  --   --   --   --  26   BUN  --   --   --   --  19.5   CR  --   --   --   --  1.05   ANIONGAP  --   --   --   --  10   MAURICIO  --   --   --   --  8.6   * 149* 138*   < > 132*    < > = values in this interval not displayed.       Recent Results (from the past 24 hour(s))   Echocardiogram Complete   Result Value    Biplane LVEF 38%    Narrative    [id number]  [id number]  [id number]^LURDES^ANAIS^ZINA     [facility]  Echocardiography Laboratory  [address] [place] [address]     Name: BREONNA FOSTER  MRN: [id number]  : 1933  Study Date: 2024 09:29 AM  Age: 89 yrs  Gender: Male  Patient Location: [facility]  Reason For Study: CVA  Ordering Physician: ANAIS CHATMAN  Referring Physician: ANAIS CHATMAN  Performed By: DHRUV Penaloza     BSA: 1.9 m2  Height: 70 in  Weight: 167 lb  HR: 95  BP: 150/72 mmHg  ______________________________________________________________________________  Procedure  Complete Portable Echo Adult. Technically difficult study.  ______________________________________________________________________________  Interpretation Summary     Mild concentric left ventricular hypertrophy.  Moderately decreased left ventricular systolic function  Biplane LVEF is 38%.  There is moderate global hypokinesia of the left ventricle.  Septal motion is consistent with conduction abnormality.  Grade 1 left ventricular diastolic function.  No significant valve disease.  Inferior vena cava not well visualized.     Compared to the previous study dated 2022, LVEF has decreased from 55%  to  38%.     ______________________________________________________________________________  Left Ventricle  The left ventricle is normal in size. There is mild concentric left  ventricular hypertrophy. Moderately decreased left ventricular systolic  function. Biplane LVEF is 38%. Grade I or early diastolic dysfunction. There  is moderate global hypokinesia of the left ventricle. Septal motion is  consistent with conduction abnormality.     Right Ventricle  The right ventricle is normal in size and function.     Atria  Normal left atrial size. Right atrial size is normal. There is no atrial shunt  seen.     Mitral Valve  The mitral valve leaflets are mildly thickened. There is mild mitral annular  calcification. There is trace mitral regurgitation. There is no mitral valve  stenosis.     Tricuspid Valve  The tricuspid valve is not well visualized, but is grossly normal. There is  trace tricuspid regurgitation. The right ventricular systolic pressure is  approximated at 28.3 mmHg plus the right atrial pressure.     Aortic Valve  The aortic valve is trileaflet with aortic valve sclerosis. There is trace  aortic regurgitation. No hemodynamically significant valvular aortic stenosis.     Pulmonic Valve  The pulmonic valve is not well visualized. There is trace pulmonic valvular  regurgitation. Normal pulmonic valve velocity.     Vessels  The aortic root is normal size. Normal size ascending aorta. Inferior vena  cava not well visualized for estimation of right atrial pressure.     Pericardium  There is no pericardial effusion.     Rhythm  Sinus rhythm was noted.  ______________________________________________________________________________  MMode/2D Measurements & Calculations  IVSd: 1.3 cm     LVIDd: 5.1 cm  LVIDs: 3.5 cm  LVPWd: 1.3 cm  FS: 32.0 %  LV mass(C)d: 263.2 grams  LV mass(C)dI: 136.1 grams/m2  Ao root diam: 3.1 cm  asc Aorta Diam: 3.7 cm  LVOT diam: 2.5 cm  LVOT area: 4.9 cm2  Ao root diam index Ht(cm/m):  1.7  Ao root diam index BSA (cm/m2): 1.6  Asc Ao diam index BSA (cm/m2): 1.9  Asc Ao diam index Ht(cm/m): 2.1     LA Volume Indexed (AL/bp): 27.2 ml/m2  RV Base: 2.1 cm  RWT: 0.50  TAPSE: 2.0 cm     Doppler Measurements & Calculations  MV E max rajan: 56.2 cm/sec  MV A max rajan: 93.6 cm/sec  MV E/A: 0.60  MV max P.5 mmHg  MV mean P.0 mmHg  MV V2 VTI: 23.9 cm  MVA(VTI): 3.0 cm2  MV dec time: 0.13 sec  LV V1 max PG: 3.5 mmHg  LV V1 max: 94.2 cm/sec  LV V1 VTI: 14.8 cm  SV(LVOT): 72.6 ml  SI(LVOT): 37.6 ml/m2  PA acc time: 0.10 sec  TR max rajan: 266.0 cm/sec  TR max P.3 mmHg  Pulm Sys Rajan: 62.2 cm/sec  Pulm Awan Rajan: 44.8 cm/sec  Pulm A Revs Rajan: 40.8 cm/sec  Pulm S/D: 1.4  E/E' av.0  Lateral E/e': 9.3     Medial E/e': 6.7  RV S Rajan: 18.8 cm/sec     ______________________________________________________________________________  Report approved by: Dr Mike Park 2024 10:58 AM               Assessment & Plan      Nathan Macias is a 89 year old male with a history of atrial fibrillation on chronic anticoagulation with apixaban, type 2 diabetes, BPH, hyperlipidemia who presents via EMS with complaints of significant speech difficulties.  Patient felt fine when he went to bed last night but started noticing difficulty with his speech when he woke up this morning.  He had trouble getting his words out.  Denies any recent head trauma, fever or chills or any infectious etiology.  Denies any chest pain or lightheadedness.  His wife did report that he had significant aphasia.  There was no difficulty with his gait.  His wife noted slurring of his words at 650 this morning.  EMS was called and he was found to be hypertensive with systolic blood pressures in the 170s and he was brought to the hospital where code stroke was called.  By the time I evaluated the patient his symptoms were almost completely resolved.  He continues to have some mild expressive aphasia with word finding difficulties.      #1 left-sided acute ischemic stroke in the left caudate nucleus and left frontal lobe with expressive aphasia  Previous history of right cerebellar stroke  -Concern for possible ischemic stroke.  -Code stroke was called and neurostroke team evaluated patient.  -CTA head and neck was done which showed no large vessel occlusion, no significant stenosis throughout major neck arteries.  No dissection.  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  -CT head without contrast showed no acute stroke.  -MRI brain with and without contrast showed recent tiny ischemic infarct in the left caudate nucleus and left frontal lobe.  -Patient is being admitted under observation to complete stroke workup.  -Started on aspirin 81 daily.  Recommend for a 90-day course.  -Continue home DOAC apixaban 5 mg twice daily.  -Continue atorvastatin 10 mg daily.  Lipid panel showed LDL of 41.  -Allow for permissive hypertension up to .  Holding home antihypertensives for now.  -PT/OT/speech consulted.  Cleared him for discharge with possible outpatient therapy.  Speech therapy cleared him for regular diet.  Patient is not able to make it to outpatient therapy.  Ordered home care with home PT OT.  -Echocardiogram ordered.  Completed today.  Review below.  -Continue cardiac monitoring.  -Stroke neurology team consulted.  -Carotid ultrasound shows less than 50% stenosis in the right and left ICA  -CT chest abdomen pelvis done with contrast which did not show any occult malignancy.  Does show severe coronary calcifications with moderate to severe enlargement of the prostate gland  - neurology cleared for discharge.    2. Reduced EF with systolic dysfunction   -Echocardiogram done as a part of stroke workup showed significantly reduced EF of 35%.  Patient has baseline atrial fibrillation and has been intermittently tachycardic.  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  -Troponin  trend showed 24---23---23.  Denied any active chest pain or shortness of breath.  -Cardiology consulted.  -Restarted Toprol-XL 25 mg daily.  Was initially held to allow for permissive hypertension.  - clinically euvolemic   -Will defer the rest of goal-directed medical therapy to cardiology team.     2.  Type 2 diabetes mellitus  Hyperlipidemia  -metformin 500 mg daily at home.    -HbA1c ordered.  -Ordered for carb controlled diet once he passes bedside swallow evaluation.  -Sliding scale insulin aspart.  Holding metformin.  -Lipid panel showed LDL of 41 with triglycerides of 70.  Continue atorvastatin 10 mg daily     3.  BPH with history of urinary retention-on tamsulosin 0.8 mg daily     4.  Paroxysmal atrial fibrillation-patient is currently in sinus rhythm.  On apixaban.  Continue with cardiac monitoring.  Restarted Toprol-XL 25 mg daily     #5 history of hospital induced delirium-  -was treated with Seroquel in the past.  Not currently on it.  -High risk for delirium while in hospital.  Maintain wake sleep cycles.  No episodes of delirium during this hospital stay         Clinically Significant Risk Factors Present on Admission               # Drug Induced Coagulation Defect: home medication list includes an anticoagulant medication        # DMII: A1C = 6.8 % (Ref range: <5.7 %) within past 6 months        # Financial/Environmental Concerns: none          DVT Prophylaxis: Pneumatic Compression Devices  Code Status: Full Code  Disposition: Expected discharge after cardiology workup      Lindsay Lou MD, MD  [phone](p)

## 2024-03-09 NOTE — PLAN OF CARE
Goal Outcome Evaluation:       Pt here with Small L ischemic CVA's. A&O x4. Neuros slight slurred speech, gen. weakness. VSS, LS slightly diminished. Tele SR with BBB.  Reg mod carb diet, thin liquids. Takes pills whole with water. Up with A1 GB W. Denies pain. Pt scoring green on the Aggression Stop Light Tool. Plan for Echo tomorrow. Discharge to home tomorrow after Echo is done and final neurology rec.

## 2024-03-09 NOTE — PLAN OF CARE
"/74 (BP Location: Right arm)   Pulse 107   Temp 98.4  F (36.9  C) (Oral)   Resp 16   Ht 1.778 m (5' 10\")   Wt 76.1 kg (167 lb 12.3 oz)   SpO2 95%   BMI 24.07 kg/m      Patient name: Nathan Macias    Summary: Patient here with CVA, currently has slightly slurred speech, otherwise intact. Tele is bundle branch block. Okay to discharge today, however may need to postpone due to lack of a ride.    Brian Mascorro, RN  [facility]  [phone]        "

## 2024-03-09 NOTE — CONSULTS
CARDIOLOGY CONSULTATION  March 9, 2024    REQUESTING PROVIDER: SAL Lou MD    REASON FOR CONSULTATION: new cardiomyopathy.      HISTORY OF PRESENT ILLNESS:    89-year-old male with history of DM type II, previous cerebellar stroke, paroxysmal atrial fibrillation (on apixaban), dyslipidemia admitted on 3/7/2024 with speech difficulties. Brain imaging revealed small infarcts in the left caudate and left frontal lobe. Has known L ICA stenosis.    As part of in-hospital evaluation he underwent an echocardiogram which I have reviewed. To my eye it shows mild global LV dysfunction. EF was formally read at 38%. Previous echocardiogram in 2022 had shown EF 55%.    Other than history of paroxysmal AF the patient is unaware of other cardiac issues in the past. He has never had a heart attack. He has not had chest pain in any point in the recent past. No unusual dyspnea, orthopnea/PND, syncope or near syncope, LE edema or other cardiac symptoms.    The patient lives with his wife independently in a senior apartment. He is a non-smoker and nondrinker.      DIAGNOSTIC STUDIES:  Labs: sodium 140, potassium 4.4, creatinine 1.05, troponin 24 - 23, hematocrit 37.4%, platelets 157K  12-lead ECG:  SR, RBBB/LAFB  Echocardiogram: see HPI      IMPRESSION:  New and asymptomatic LV dysfunction. No clinical evidence of CHF. Etiology of LV dysfunction is unclear at the moment. Ischemic heart disease and stress cardiomyopathy are potential etiologies though the echocardiographic pattern does not particularly fit either condition. The patient has history of AF but is currently in sinus rhythm, therefore it is difficult to implicate rate-related cardiomyopathy.    Recent stroke. Neurology has added aspirin to his PTA apixaban for 90 days.    RECOMMENDATIONS:    Continue PTA apixaban, atorvastatin and metoprolol XL. No new cardiac medications.  I have ordered a repeat echocardiogram in 2 months with follow-up with general cardiology  "thereafter. If LV dysfunction persists, further evaluation with a stress test and additional medical therapy will be considered.    I appreciate the opportunity to be part of this patient's care.  Please feel free to call me with any questions.    Eula Villasenor MD, [facility]        PHYSICAL EXAM:  Vitals: /74 (BP Location: Right arm)   Pulse 107   Temp 98.4  F (36.9  C) (Oral)   Resp 16   Ht 1.778 m (5' 10\")   Wt 76.1 kg (167 lb 12.3 oz)   SpO2 95%   BMI 24.07 kg/m      Intake/Output Summary (Last 24 hours) at 3/9/2024 1412  Last data filed at 3/9/2024 0951  Gross per 24 hour   Intake 240 ml   Output --   Net 240 ml     Vitals:    03/07/24 0742   Weight: 76.1 kg (167 lb 12.3 oz)     Constitutional: very pleasant gentlemen, appears younger than stated age. Alert and oriented.  Pt appears comfortable, has no CP or respiratory distress.  Head: Normocephalic and atraumatic.   Skin:  Normal color and texture.  No rashes, lesions or eruptions.  Eyes:  no jaundice, EOMI.  ENT:  Supple, normal JVP, no apparent thyroid enlargement.  Lymph:  No lymphadenopathy   Chest/Lungs:  Clear bilaterally, no rales or wheezing.  Cardiac:  Regular rhythm occasional ectopic beat, normal S1 and S2.  No murmur, rub or gallop.    GI:  Normal bowel sounds. Abdomen is soft, non-tender & not distended.  No rebound or guarding.    Extremities:  Radial pulses 2+ bilaterally.  DP and PT pulses palpable bilaterally.  No lower extremity edema is present.   Neurological:  CN 2-12 grossly intact.  Strength in UE is normal & symmetric.    Back:  No CVA tenderness.     REVIEW OF SYSTEMS:  A complete review of system was performed and was negative with the exception of what was described in the HPI section.     CURRENT MEDICATIONS:   apixaban ANTICOAGULANT  5 mg Oral BID    aspirin  81 mg Oral Daily    atorvastatin  10 mg Oral Daily    insulin aspart  1-7 Units Subcutaneous TID AC    insulin aspart  1-5 Units Subcutaneous At Bedtime    " metoprolol succinate ER  25 mg Oral Daily    tamsulosin  0.8 mg Oral QPM       ALLERGIES   No Known Allergies    PAST MEDICAL HISTORY:  Past Medical History:   Diagnosis Date    A-fib (H)     BPH (benign prostatic hyperplasia)     CAD (coronary artery disease)     DMII (diabetes mellitus, type 2) (H)     TIA (transient ischemic attack)        PAST SURGICAL HISTORY:  Past Surgical History:   Procedure Laterality Date    BACK SURGERY      1990s    TONGUE SURGERY      due to precancerous lesion       FAMILY HISTORY:  negative with regard to premature heart disease.    SOCIAL HISTORY:  Social History     Socioeconomic History    Marital status:    Tobacco Use    Smoking status: Former     Types: Cigars, Pipe     Quit date:      Years since quittin.2   Substance and Sexual Activity    Alcohol use: Never         Recent Lab Results:  Recent Labs   Lab 24  1112 24  0715 24  0148 24  1524 24  0741   WBC  --   --   --   --  6.7   HGB  --   --   --   --  12.7*   MCV  --   --   --   --  91   PLT  --   --   --   --  157   INR  --   --   --   --  1.35*   NA  --   --   --   --  140   POTASSIUM  --   --   --   --  4.4   CHLORIDE  --   --   --   --  104   CO2  --   --   --   --  26   BUN  --   --   --   --  19.5   CR  --   --   --   --  1.05   ANIONGAP  --   --   --   --  10   MAURICIO  --   --   --   --  8.6   * 149* 138*   < > 132*    < > = values in this interval not displayed.         Clinically Significant Risk Factors Present on Admission               # Drug Induced Coagulation Defect: home medication list includes an anticoagulant medication        # DMII: A1C = 6.8 % (Ref range: <5.7 %) within past 6 months        # Financial/Environmental Concerns: none

## 2024-03-10 VITALS
HEART RATE: 67 BPM | DIASTOLIC BLOOD PRESSURE: 70 MMHG | SYSTOLIC BLOOD PRESSURE: 139 MMHG | WEIGHT: 167.77 LBS | RESPIRATION RATE: 16 BRPM | BODY MASS INDEX: 24.02 KG/M2 | OXYGEN SATURATION: 94 % | TEMPERATURE: 97.8 F | HEIGHT: 70 IN

## 2024-03-10 LAB — GLUCOSE BLDC GLUCOMTR-MCNC: 124 MG/DL (ref 70–99)

## 2024-03-10 PROCEDURE — 250N000013 HC RX MED GY IP 250 OP 250 PS 637: Performed by: NURSE PRACTITIONER

## 2024-03-10 PROCEDURE — 99232 SBSQ HOSP IP/OBS MODERATE 35: CPT | Performed by: HOSPITALIST

## 2024-03-10 PROCEDURE — 250N000013 HC RX MED GY IP 250 OP 250 PS 637: Performed by: HOSPITALIST

## 2024-03-10 PROCEDURE — G0378 HOSPITAL OBSERVATION PER HR: HCPCS

## 2024-03-10 PROCEDURE — 82962 GLUCOSE BLOOD TEST: CPT

## 2024-03-10 RX ADMIN — APIXABAN 5 MG: 5 TABLET, FILM COATED ORAL at 09:28

## 2024-03-10 RX ADMIN — METOPROLOL SUCCINATE 25 MG: 25 TABLET, EXTENDED RELEASE ORAL at 09:28

## 2024-03-10 RX ADMIN — ASPIRIN 81 MG CHEWABLE TABLET 81 MG: 81 TABLET CHEWABLE at 09:28

## 2024-03-10 ASSESSMENT — ACTIVITIES OF DAILY LIVING (ADL)
ADLS_ACUITY_SCORE: 27

## 2024-03-10 NOTE — PROGRESS NOTES
Reason for Admission:  left-sided acute ischemic stroke with expressive aphasia  Cognitive/Mentation: A/Ox 4  Neuros/CMS: Intact ex slurred speech, mild to moderate aphasia, and generalized LE weakness.   VS: VSS SBP<180.   Tele: SR with BBB.  GI: BS clear, Continent.  : Continent.  Pulmonary: LS clear.  Pain: denies.   Drains/Lines: PIV SL  Activity: SBA with GBW  Diet: Combination modcarb  with thin liquids.   Therapies recs: home with outpatient therapy  Discharge: today  Aggression Stoplight Tool: green  End of shift summary: discharge home 3/10, wife to come pick him up

## 2024-03-10 NOTE — PROGRESS NOTES
Speech Language Therapy Discharge Summary    Reason for therapy discharge:    Discharged to home.     Progress towards therapy goal(s). See goals on Care Plan in [facility] electronic health record for goal details.  Goals partially met.  Barriers to achieving goals:   discharge from facility.    Therapy recommendation(s):    See below         03/07/24 5382   SLP Discharge Planning   SLP Plan diet f/u; speech/dysarthria eval as indicated (mild dysarthria improving 3/7)   SLP Discharge Recommendation home with outpatient therapy services;home with assist  (pending progress; defer to PT/OT pending recommendations)   SLP Rationale for DC Rec Patient may meet SLP goals prior to discharge.   SLP Brief overview of current status  Recommend regular diet and thin liquids given swallow strategies and reflux precautions (fully upright position, small bites/sips, slow pace). Please serve pills one at a time. Monitor for increased signs/symptoms of aspiration and hold PO if occur. SLP to follow for swallowing and dysarthria.

## 2024-03-10 NOTE — PLAN OF CARE
PMH: Admit 3/7 with trouble speaking, found to have left frontal lobe CVA. A-fib, DM II, BPH, HLD.    Pain/comfort: Denies: pain, chest pain, nausea, SOB.    Assessment: Alert & oriented x4. Vital signs stable on room air. Neuro status assessed & monitored, WDL except: slurred speech, mild/moderate aphasia, generalized LE weakness. Telemetry shows sinus rhythm with a BBB and PACs. Heart tones distant. Apical pulse irregular. Anterior upper lung sounds clear. Continent of bladder, frequent voiding this shift. BG = 176, 173. Plan: discharge home 3/10, wife to come pick him up.    SBA w/ GB + RW. Carb controlled/thin diet.     Ongoing cardiac monitoring. Vitals/neuro assessment per unit protocol. Medications administered as ordered. Education regarding plan of care.     Radha Mackay, RN on 3/9/2024 at 10:48 PM

## 2024-03-10 NOTE — PLAN OF CARE
"/70 (BP Location: Right arm)   Pulse 67   Temp 97.8  F (36.6  C) (Oral)   Resp 16   Ht 1.778 m (5' 10\")   Wt 76.1 kg (167 lb 12.3 oz)   SpO2 94%   BMI 24.07 kg/m      Patient name: Nathan Macias    Summary: Patient here with stroke, only neuro deficit is slight slurred speech, oriented x4, very pleasant. Adequate for discharge    Brian Mascorro, RN  [facility]  [phone]    "

## 2024-03-11 ENCOUNTER — PATIENT OUTREACH (OUTPATIENT)
Dept: CARE COORDINATION | Facility: CLINIC | Age: 89
End: 2024-03-11
Payer: COMMERCIAL

## 2024-03-11 NOTE — PROGRESS NOTES
Clinic Care Coordination Contact  [facility]: Post-Discharge Note  SITUATION                                                      Admission:    Admission Date: 03/07/24   Reason for Admission: History of stroke    Acute ischemic stroke (H)    Anticoagulated  Discharge:   Discharge Date: 03/10/24  Discharge Diagnosis: History of stroke    Acute ischemic stroke (H)    Anticoagulated    BACKGROUND                                                      Per hospital discharge summary and inpatient provider notes:    Nathan Macias is a 89 year old male with a history of atrial fibrillation on chronic anticoagulation with apixaban, type 2 diabetes, BPH, hyperlipidemia who presents via EMS with complaints of significant speech difficulties.  Patient felt fine when he went to bed last night but started noticing difficulty with his speech when he woke up this morning.  He had trouble getting his words out.  Denies any recent head trauma, fever or chills or any infectious etiology.  Denies any chest pain or lightheadedness.     ASSESSMENT           Discharge Assessment  How are you doing now that you are home?: feeling better  How are your symptoms? (Red Flag symptoms escalate to triage hotline per guidelines): Improved  Do you feel your condition is stable enough to be safe at home until your provider visit?: Yes  Does the patient have their discharge instructions? : Yes  Does the patient have questions regarding their discharge instructions? : No  Were you started on any new medications or were there changes to any of your previous medications? : Yes  Does the patient have all of their medications?: Yes  Do you have questions regarding any of your medications? : No  Do you have all of your needed medical supplies or equipment (DME)?  (i.e. oxygen tank, CPAP, cane, etc.): Yes  Discharge follow-up appointment scheduled within 14 calendar days? : No  Is patient agreeable to assistance with scheduling? : No  (declined)    Post-op ([facility] Only)  If the patient had a surgery or procedure, do they have any questions for a nurse?: No         PLAN                                                      Outpatient Plan:      No future appointments.    Follow-up and recommended labs and tests      Follow up with primary care provider, GEORGE MARTINEZ, within 7 days for hospital follow- up.  No follow up labs or test are needed.  Follow up with stroke neurology in 4 weeks     For any urgent concerns, please contact our 24 hour nurse triage line: [phone]     FOREIGN Ferguson  [phone]  [facility]

## 2024-04-01 NOTE — PROGRESS NOTES
RECEIVING UNIT ED HANDOFF REVIEW    ED Nurse Handoff Report was reviewed by: Faith Heller RN on March 7, 2024 at 1:23 PM       (M6) obeys commands

## 2024-07-04 NOTE — PLAN OF CARE
Shift Note [id number]:   Patient admitted to unit for CVA. Patient is alert and oriented x1-disoriented to place, time, situation. Assist x2 GB/W. VSS. denies pain. Aggression Stop Light green. Tele: SR with BBB and PVCs.     Pollack present-redness noted, per previous nurse report, pt pulled on cath. Urology has no recommendations at this time, orders to flush as needed. Redness in urine subsiding.     Major Shift Events: urology consult, swallow eval completed, diet changed.   
"/55 (BP Location: Right arm)   Pulse 75   Temp 97.2  F (36.2  C) (Axillary)   Resp 20   Ht 1.715 m (5' 7.5\")   Wt 84 kg (185 lb 3 oz)   SpO2 94%   BMI 28.58 kg/m      Patient is currently here with an acute ischemic stroke. Patient is alert and oriented to person and place, but is disoriented to time and situation. Patient is neurologically intact aside from intermittent confusion, dysarthria, word-finding difficulties and ataxia in both upper extremities bilaterally. VSS on RA. Patient transfers and ambulates with an assist of two plus the utilization of a gait belt and walker. Denies pain. Patient is incontinent of both bladder and bowel, currently has an indwelling feliz catheter in position that is patent and is displaying adequate urinary output. Patient's skin is intact and in good condition aside from scattered bruising and an abrasion on the patient's back. Patient is currently on a minced and moist diet with mildly thickened liquids. Takes pills whole or crushed in applesauce. Patient is currently scoring yellow on the Aggression Stop Light Tool related to impulsivity, 1:1 sitter in place to ensure patient safety. Plan to continue to assess for any potential neurological changes. Discharge plans pending eventual placement in a transitional care unit.    "
"/62 (BP Location: Left arm)   Pulse 69   Temp 97.3  F (36.3  C) (Axillary)   Resp 16   Ht 1.715 m (5' 7.5\")   Wt 84 kg (185 lb 3 oz)   SpO2 95%   BMI 28.58 kg/m      Patient is currently here with an acute ischemic stroke. Patient is alert and oriented to person and place, but is disoriented to place and situation. Patient is neurologically intact aside from intermittent confusion, dysarthria, word-finding difficulties and ataxia in both upper extremities bilaterally. VSS on RA. Patient transfers and ambulates with an assist of two plus the utilization of a gait belt and walker. Denies pain. Patient is incontinent of both bladder and bowel, currently has an indwelling feliz catheter in position that is patent and is displaying adequate urinary output. Patient's skin is intact and in good condition aside from scattered bruising as well as blanchable redness on the patient's sacrum/coccyx. Patient is currently on a minced and moist diet with moderately thickened liquids. Takes pills whole or crushed in applesauce. Patient is currently scoring yellow on the Aggression Stop Light Tool related to impulsivity, 1:1 sitter and bilateral soft wrist restraints in place to ensure patient safety. Plan to continue to assess for any potential neurological changes. Discharge plans pending eventual placement in a transitional care unit.  "
"/63 (BP Location: Right arm)   Pulse 67   Temp 98.3  F (36.8  C) (Oral)   Resp 18   Ht 1.715 m (5' 7.5\")   Wt 84 kg (185 lb 3 oz)   SpO2 96%   BMI 28.58 kg/m      Patient is currently here with an acute ischemic stroke. Patient is alert and oriented, neurologically intact aside from intermittent confusion, dysarthria, word-finding difficulties and ataxia in both upper extremities bilaterally. VSS on RA. Patient transfers and ambulates with an assist of two plus the utilization of a gait belt and walker. Denies pain. Patient is incontinent of both bladder and bowel, currently has an indwelling feliz catheter in position that is patent and is displaying adequate urinary output. Patient's skin is intact and in good condition aside from scattered bruising as well as blanchable redness on the patient's sacrum/coccyx. Patient is currently on a minced and moist diet with moderately thickened liquids. Takes pills whole or crushed in applesauce. Patient is currently scoring yellow on the Aggression Stop Light Tool related to impulsivity, 1:1 sitter in place to ensure patient safety. Plan to continue to assess for any potential neurological changes. Discharge plans pending eventual placement in a transitional care unit.    "
"/72 (BP Location: Left arm, Patient Position: Prone, Cuff Size: Adult Regular)   Pulse 92   Temp 97.3  F (36.3  C) (Oral)   Resp 14   Ht 1.715 m (5' 7.5\")   Wt 84 kg (185 lb 3 oz)   SpO2 98%   BMI 28.58 kg/m      Patient is currently here with an acute ischemic stroke. Patient is alert and oriented, neurologically intact aside from intermittent confusion, dysarthria, word-finding difficulties and ataxia in both upper extremities bilaterally. VSS on RA. Patient transfers and ambulates with an assist of two plus the utilization of a gait belt and walker. Denies pain. Patient is incontinent of both bladder and bowel, currently has an indwelling feliz catheter in position that is patent and is displaying adequate urinary output. Patient's skin is intact and in good condition aside from scattered bruising as well as blanchable redness on the patient's sacrum/coccyx. Patient is currently on a minced and moist diet with moderately thick liquids. Takes pills whole or crushed in applesauce. Patient is currently scoring yellow on the Aggression Stop Light Tool related to impulsivity and noncompliance. Patient has an unwitnessed fall at 1430 after attempting to ambulate independently despite being educated multiple times to call for assistance prior to ambulation. The patient did suffer from any apparent injuries from this event, vital signs have remained stable and no neurological changes occurred. Provider notified and 1:1 sitter requested to ensure future patient safety. Plan to continue to assess for any potential neurological changes. Discharge plans pending eventual placement in a transitional care unit.    "
"/88 (BP Location: Left arm)   Pulse 77   Temp 96.8  F (36  C) (Axillary)   Resp 20   Ht 1.715 m (5' 7.5\")   Wt 84 kg (185 lb 3 oz)   SpO2 96%   BMI 28.58 kg/m      Patient is currently here with an acute ischemic stroke. Patient is alert and oriented to person and place, but is disoriented to time and situation. Patient is neurologically intact aside from intermittent confusion, dysarthria, ptosis, word-finding difficulties and ataxia in both upper extremities bilaterally. VSS on RA. Patient transfers and ambulates with an assist of two plus the utilization of a gait belt and walker. Denies pain. Patient is incontinent of both bladder and bowel, currently has an indwelling feliz catheter in position that is patent and is displaying adequate urinary output. Patient's skin is intact and in good condition aside from scattered bruising and an abrasion on the patient's back. Patient is currently on a minced and moist diet with mildly thickened liquids. Takes pills whole or crushed in applesauce. Patient is currently scoring yellow on the Aggression Stop Light Tool related to impulsivity and currently has soft mitts in position bilaterally to ensure patient safety. Patient was previously pulling on his feliz during the night shift resulting in urethral trauma and now has maroon colored urine. Plan to continue to assess for any potential neurological changes. Discharge plans pending eventual placement in a transitional care unit.  "
"BP (!) 156/83 (BP Location: Left arm)   Pulse 98   Temp 97.6  F (36.4  C) (Axillary)   Resp 16   Ht 1.715 m (5' 7.5\")   Wt 84 kg (185 lb 3 oz)   SpO2 97%   BMI 28.58 kg/m      Patient is currently here with an acute ischemic stroke. Patient is alert and oriented to person and place, but is disoriented to time and situation. Patient is neurologically intact aside from intermittent confusion, dysarthria, ptosis, word-finding difficulties and ataxia in both upper extremities bilaterally. VSS on RA. Patient transfers and ambulates with an assist of two plus the utilization of a gait belt and walker. Denies pain. Patient is incontinent of both bladder and bowel, currently has an indwelling feliz catheter in position that is patent and is displaying adequate urinary output. Patient's skin is intact and in good condition aside from scattered bruising and an abrasion on the patient's back. Patient is currently on a minced and moist diet with mildly thickened liquids. Takes pills whole or crushed in applesauce. Patient is currently scoring yellow on the Aggression Stop Light Tool related to impulsivity and currently has soft mitts in position bilaterally to ensure patient safety. Plan to continue to assess for any potential neurological changes. Discharge plans pending eventual placement in a transitional care unit.    "
"BP (!) 168/93 (BP Location: Left arm)   Pulse 97   Temp 97.7  F (36.5  C) (Axillary)   Resp 16   Ht 1.715 m (5' 7.5\")   Wt 84 kg (185 lb 3 oz)   SpO2 95%   BMI 28.58 kg/m      Patient is currently here with an acute ischemic stroke. Patient is alert and oriented to person and place, but is disoriented to time and situation. Patient is neurologically intact aside from intermittent confusion, dysarthria, ptosis, word-finding difficulties and ataxia in both upper extremities bilaterally. VSS on RA with intermittent hypertension. Patient transfers and ambulates with an assist of two plus the utilization of a gait belt and walker. Denies pain. Patient is incontinent of both bladder and bowel, currently has an indwelling feliz catheter in position that is patent and is displaying adequate urinary output. Patient's skin is intact and in good condition aside from scattered bruising and an abrasion on the patient's back. Patient is currently on a minced and moist diet with mildly thickened liquids. Takes pills whole or crushed in applesauce. Patient is currently scoring yellow on the Aggression Stop Light Tool related to impulsivity and currently has soft mitts in position bilaterally to ensure patient safety. Patient was previously pulling on his feliz during the night shift resulting in urethral trauma and now has maroon colored urine. Plan to continue to assess for any potential neurological changes. Discharge plans pending eventual placement in a transitional care unit.  "
"Goal Outcome Evaluation:    Plan of Care Reviewed With: patient     Overall Patient Progress: improving    Outcome Evaluation: Diet: Minced/Moist (L5), Moderately Thick Liquids (L3) + magic cup at dinner.  Pt reports tolerating po - \"the milk is thicker than cream!\"  Would like to try a different magic cup flavor tonight.  He is being seen daily for meal ordering assistance.  Will plan to send navarro magic cup with dinner meals.      "
"Pt here with right cerebellar stroke.      Continues to have aphasia. Disoriented to time, situation, place. BP stable this shift. Compliant. Bed alarm on for safety. Will continue to monitor.    /53   Pulse 77   Temp 97.6  F (36.4  C)   Resp 16   Ht 1.715 m (5' 7.5\")   Wt 84 kg (185 lb 3 oz)   SpO2 97%   BMI 28.58 kg/m        "
"Reason for Admission: R cerebellar CVA    Cognitive/Mentation: A/Ox 2, dis time and situation. Confused  Neuros/CMS: Intact ex dysarthria, confusion, WFD, and bilateral upper extremity ataxia.  VS: Stable.  GI: BS +, + flatus, last BM today. Incontinent.  : Feliz for retention, to discharge with. \"[employer]\" colored urine from pulling feliz out last night, MD aware.   Pulmonary: LS diminished.  Pain: Denies.     Drains: None  Skin: Scattered bruising, scabs to right toes, abrasion to back.  Activity: Assist x 2 with GBW.  Diet: Minced and moist with moderately thickened liquids. Takes pills whole or crushed in pudding.     Aggression Stoplight Score: Yellow for confusion and impulsivity  Therapies recs: TCU  Discharge: Pending placement    End of shift summary: Patient continues to pull at lines/feliz, bilateral soft mitts maintained. Patient refused dinner this evening, sliding scale insulin and carb count insulin held. Patient impulsive into evening, on zone 2 bed alarm.        "
"Reason for Admission: R cerebellar stroke   Cognitive/Mentation: A/Ox 2  Neuros/CMS: Stable with generalized weakness and expressive aphasia. Strength 4/5 uppers/lowers. Speech is very garbled, dysarthric, slurred with WFD. BUE ataxic. Confused/forgetful.    VS: VSS on RA  Tele: N/A  GI: BS audible, + flatus, last BM 8/17/22. Incontinent.  : Feliz in place for retention, ok output. '[employer]\" colored from pulling feliz out yesterday.    Pulmonary: LS diminished  Pain: Denies   Drains/Lines: None  Skin: Intact, with scattered bruising   Activity: Assist x 2 with lift device.  Diet: Minced ad moist diet with moderately thick liquids. Takes pills whole.   Therapies recs: TCU  Discharge: Pending bilateral mitt removal.   Aggression Stoplight Tool: Yellow for impulsiveness.   End of shift summary: No changes this shift. Bilateral mitts in place overnight. Pt wasn't having any output from feliz, bladder scanned for 305 mL. Feliz irrigated with 160 mL of fluids which loosened clots, feliz now patent. Pt impulsive this morning.  "
[id number]    No acute events overnight. Stable vital signs and no change in neuro status. Pt had 1x large spontaneous void, no need for straight cath.    Bryant Armstrong RN on 8/22/2022 at 6:08 AM      
[id number]    Reason for Admission: R cerebral CVA    Cognitive/Mentation: A/Ox 3. D/o time  Neuros/CMS: Intact ex expressive aphasia   VS: stable.   Tele: SR/SB.  GI: BS active, + flatus. Continent.  : feliz. Adequate output  Pulmonary: LS clear.  Pain: denies.     Drains/Lines: PIV  Skin: intact ex scattered bruising  Activity: Assist x 2 with lift.  Diet: Minced and moist with moderately thick liquids. Takes pills whole in applesauce.     Therapies recs: memory care placement  Discharge: pending placement    Aggression Stoplight Tool: greeen    End of shift summary: Patient stable throughout shift. Sitter at bedside. Removed R mitt at 1645.       
A+Ox4 but forgetful. VSS on room air. Tele a-fib CVR w/ PVCs. Neuros intact ex word finding difficulty and slurred speech. Lung sounds clear. Incontinent of bladder. Bowels active, flatus+, no BM. Up w/ 2. Tolerating diet. Denies pain.  
Alert to self, confused. Speech garbled, mild-mod aphasia. Generalized weakness present. Neuros otherwise intact. Up with A2 with GB/WW. Impulsive, but redirectable; bed/chair alarms in place with frequent safety checks. RRT this afternoon for hypotension/lethargy episode; see note. Pt back to BL shortly after, VS improved. C/o abd pain after episode, relieved with PRN APAP. Tolerating diet. Plan to discharge to TCU, SW following. Continue to monitor.   
Alert to self, time; disoriented to place/situation. Up with A1 with GB/WW. Sitter present at bedside for restlessness/impulsivity. Pt pleasant and cooperative today. Neuros intact except expressive aphasia, ataxia in BUE. Tolerating minced/moist diet + mod thick liquids well. Pills whole if small, otherwise crushed with applesauce. VSS on RA. Tele Afib CVR + BBB. Pollack removed this afternoon, voided X 1, 25 ml; PVR 330cc. Urine surya, red-tinged. IVF's stopped. Wife present this AM, updated on POC. Continue to monitor.   
Disoriented to place, time and situation. AVSS on RA. Difficulty word finding. Speech slurred and garbled. BUE ataxic. Up w/ 2, gb, walker. Tolerating minced and moist diet w/ moderately thickened liquids. Straight cathed x1. Awaiting TCU placement.           
Goal Outcome Evaluation:                    Patient A&0x2. Confusion seems to fluctuate. Up with 1 to 2 GB and walker. Very unsteady and knees sometimes seem to almost give. Neuro's remain unchanged. Speech is garbled at times.   Unable to follow directions to do Finger to nose.  Lungs coarse. On RA in mid 90's at rest but 02 fell to the low 80's with ambulation.  No edema noted.Urinated well this shift.  Full code. Tolerating Diabetic minst and moist diet with moderately thick liquids. IV SL. Had BM this shift. Pt up in recliner. States he wants to sleep in recliner. Full code. Awaiting TCU placemt   
Goal Outcome Evaluation:                    Patient is currently here with an acute ischemic stroke. Patient is alert and oriented to person, but is disoriented to place, time, and situation. Patient is neurologically intact aside from intermittent confusion, dysarthria, word-finding difficulties and ataxia in both upper extremities bilaterally. VSS on RA. Patient transfers and ambulates with an assist of two plus the utilization of a gait belt and walker. Denies pain. Patient is incontinent of both bladder and bowel, currently has an indwelling feliz catheter in position that is patent and is displaying adequate urinary output. Patient's skin is intact and in good condition aside from scattered bruising and an abrasion on the patient's back. Patient is currently on a minced and moist diet with mildly thickened liquids. Takes pills whole or crushed in applesauce. Patient is currently scoring yellow on the Aggression Stop Light Tool related to impulsivity, 1:1 sitter in place to ensure patient safety. Plan to continue to assess for any potential neurological changes. Discharge plans pending eventual placement in a transitional care unit.    
Goal Outcome Evaluation:                    Reason for Admission: admitted on 8/1 following an episode of aphagia     Cognitive/Mentation: A/Ox 2 self and time    Neuros/CMS: Intact ex dysarthria , garbled speech, BLE 4/5, BUE ataxia   VS: VSS ex slight htn, no prn required.  GI: BS +, passing flatus. Incontinent.  : Feliz cath in place, redness/ puss noted at urethra last night, paged on call (see note) UA done and +, ceftriaxone started   Pulmonary: LS course with crackles, infrequent cough noted.  Pain: pt denied pain until 0400, pt able to state that he is in pain but is unable to describe the pain, given tylenol.     Drains/Lines: PIV R FA, leaking and removed, new PIV placed  Skin: abrasion on back, some scattered bruising  Activity: Assist x 2 with GB and W.  Diet: Minced/ moist with mod thick liquids. Takes pills crushed in apple sauce.     Therapies recs: TCU placement pending   Discharge: pending placement    Aggression Stoplight Tool: yellow for agitation     End of shift summary: Pt stable throughout shift, no changes in neuro assessment   Pt alternated between periods of calmness and agitation, given Zyprexa IM at 23:45 this last night, mittts also applied at this time to prevent him from pilling at his feliz cath  
Goal Outcome Evaluation:          Overall Patient Progress: declining    Outcome Evaluation: Patient consuming </= 25% of most meals per chart review. Trial Gelatein Plus supplements with meals for concentrated protein and oral intake encouragement.    Ana Tena RD, LD        
Goal Outcome Evaluation:          Overall Patient Progress: improving    Outcome Evaluation: Diet: Moderate CHO, Minced/Moist (L5), Moderately Thick Liquids (L3).  Pt being seen daily for meal ordering assistance.  Po meal intake noted to be 50-75%. Modify supplement order ---> will have pt try the Magic Cup supplement with dinner.      
Goal Outcome Evaluation:        No complaints of pain this shift. Urinary catheter remains in place.                  
Goal Outcome Evaluation:        Patient still having bloody output from urinary catheter- reports that he pulled at it, sx aware. Garbled speech. Frequently moving from bed to chair overnight.         Problem: Plan of Care - These are the overarching goals to be used throughout the patient stay.    Goal: Absence of Hospital-Acquired Illness or Injury  Intervention: Identify and Manage Fall Risk  Recent Flowsheet Documentation  Taken 8/5/2022 0256 by Nelly Watts RN  Safety Promotion/Fall Prevention:   safety round/check completed   sitter at bedside  Taken 8/4/2022 2340 by Nelly Watts RN  Safety Promotion/Fall Prevention:   safety round/check completed   sitter at bedside  Taken 8/4/2022 2024 by Nelly Watts RN  Safety Promotion/Fall Prevention:   safety round/check completed   bed alarm on  Intervention: Prevent Skin Injury  Recent Flowsheet Documentation  Taken 8/5/2022 0256 by Nelly Watts RN  Body Position: supine  Taken 8/4/2022 2024 by Nelly Watts RN  Body Position: supine  Intervention: Prevent and Manage VTE (Venous Thromboembolism) Risk  Recent Flowsheet Documentation  Taken 8/5/2022 0256 by Nelly Watts RN  Activity Management: activity adjusted per tolerance  Taken 8/4/2022 2024 by Nelly Watts RN  Activity Management: activity adjusted per tolerance  Taken 8/4/2022 2019 by Nelly Watts RN  Activity Management: activity adjusted per tolerance     Problem: Restraint, Nonbehavioral (Nonviolent)  Goal: Absence of Harm or Injury  Intervention: Protect Skin and Joint Integrity  Recent Flowsheet Documentation  Taken 8/5/2022 0256 by Nelly Watts RN  Body Position: supine  Taken 8/4/2022 2024 by Nelly Watts RN  Body Position: supine                 
Goal Outcome Evaluation:        Pt here with R Cerebellum stroke. A&O to self . Neuros Aphasia, garbled speech, weakness, . Intermittent hypotension, N. Saline bolus 500 ml given around noon. Mod carb, minced moist  diet, thicken liquids. Takes pills whole with ashleigh. sauce. Up with A2 GB W. Denies pain. Pt scoring yellow on the Aggression Stop Light Tool. Discharge pending.              
Goal Outcome Evaluation:      A&Ox1 baseline dementia. VSS on room air. uip ao1 with GBW. Incontinent of B&B. IV SL. discharge pending placement                 
Goal Outcome Evaluation:      Pt alert to self only,Up in chair incontinent ,speech garbbled. Insulin given,brusing scattered. Awaiting tcu.                
Goal Outcome Evaluation:      Remains confused, delirious, and restless. Safety measures in place. PRNs available for agitations. Vitals stable. Pending TCU discharge.                 
Goal Outcome Evaluation:     DATE & TIME: 08/24/22, [id number]    Summary: R. Ischemic cerebellar stroke, aphasia    Cognitive Concerns/ Orientation : A&O x 2, ds to place and situation      BEHAVIOR & AGGRESSION TOOL COLOR: GREEN    ABNL VS/O2: VSS on RA    MOBILITY: A x 2, gb/w    PAIN MANAGMENT: Denies    DIET: Minced and moist, mod thin liquids     BOWEL/BLADDER: Incont. B/B.    ABNL LAB/BG: , 194    DRAIN/DEVICES: L PIV SL    SKIN: CDI    D/C DAY/GOALS/PLACE: TBD Pending placement     
Goal Outcome Evaluation:     Reason for Admission: Acute ishemic CVA of right cerebellum      Code status: FC   Seizure or isolation precaution: N/A   Cognitive/Mentation: A/Ox 3, confused   Neuros/CMS: no new changes, neuro status is at baseline. see flow sheet for details. Intact ex. Slurred speech, BUE ataxic, BUE 5/5 and BLE 4/5  CIWA Protocol: NA  VS: stable   Tele: SD  GI: BS active x4, passing flatus, incontinent.  : bladder scanned every shift per MD order.   Use of feliz, external catheter, or urinal: pt has feliz catheter. Pt had bloody urine output due to pt pulling on his catheter. Bilateral soft restraint was applied. Feliz catheter was irrigated with 120ml NS. PRN zyprexa IM was also given.    Pulmonary: LS clear   Pain: No pain   Critical Labs or Electrolyte Replacement Protocol: N/A   Use of oxygen: N/A   Heparin or other drips: N/A      BG checks: q4h  Tests, MRI, CT, DEBBIE, Echo: No tests pending currently   Drains: SL   Skin: intact   Edema: No edema   Surgical site: N/A   Activity: Assist x 2 with GBW  Diet: minced and moist diet  with moderate thick  liquids. Takes pills whole with apple sauce.   Tube Feeding: N/A   Discharge: pending. TCU.                 
Goal Outcome Evaluation:    Acute ischemic stroke of R cerebellum  Orientation/Cognitive: A&O to self and somewhat place and time, Neuros intact aside form garbel speech, word finding difficulty and ataxia to upper extremites  Mobility Level/Assist Equipment:AX2, GB, Walker  Fall Risk (Y/N):Y  Behavior Concerns: Very tired today, sleeping most of shift  Pain Management: Denies pain  Tele/VS/O2: VSS on RA  ABNL Lab/BG: B, 200  Diet: Mod carb, minced and moist w/ mod thick liquids. Takes pills with applesauce  Bowel/Bladder: Pollack in place, incontinent of stool  Skin Concerns:Scattered  Bruising, scabs to back and R toes.  Drains/Devices: Pollack, PIV SL.  Tests/Procedures for next shift: NA  Anticipated DC date & active delays: Awaiting TCU placement      
Goal Outcome Evaluation:    Cognitive/Mentation: A/Ox3, d/o to time. Sitter remains at bedside due to restlessness/pulling at lines.   Neuros/CMS: Intact ex expressive aphasia   VS: VSS on RA  Tele: N/A  GI: BS active, + flatus. Continent.  : Pollack. Pus noted draining from meatus. UA sent, culture pending. Started on rocephin.   Pulmonary: LS diminished. Pt has an infrequent, nonproductive cough.   Pain: denies.   Drains/Lines: PIV L hand  Skin: intact ex scattered bruising  Activity: Assist x 2 with lift. Turn & repo q 2 hrs.  Diet: Minced and moist with moderately thick liquids. Takes pills whole in applesauce.    Discharge: pending, likely memory care vs TCU.               
Goal Outcome Evaluation:    DATE & TIME: 08/22/22, 3510-3041  Summary: R. Ischemic cerebellar stroke, aphasia  Cognitive Concerns/ Orientation : A&O x 2, ds to place and situation    BEHAVIOR & AGGRESSION TOOL COLOR: GREEN  ABNL VS/O2: VSS on RA  MOBILITY: A x 2, gb/w  PAIN MANAGMENT: Denies  DIET: Minced and moist, mod thin liquids   BOWEL/BLADDER: Incont. B/B.- straight cath as needed, LBM 8/16  ABNL LAB/BG: , 148  DRAIN/DEVICES: L PIV SL  SKIN: CDI  D/C DAY/GOALS/PLACE: TBD Pending placement        
Goal Outcome Evaluation:    Oriented to self, aphasic, garbled speech. Pt rested inbetween periods of restlessness, Pt inconsistently follows. Pt on zone 2 bed alarm. VSS ex SBP in 160s. CMS intact. Neuros ex. Aphasic, garbled speech and moderate strength in all extremities. Denies pain. Voiding urinal with intermittent incontinence. Blanchable redness to Coccyx and lower spine, preventative Mepilexes placed, CDI. No IV Access, pt pulled out this morning. Strong A2 gb/walker. Tolerating diet and thickened liquids. Discharge to TCU pending.                 
Goal Outcome Evaluation:    Plan of Care Reviewed With: patient     Overall Patient Progress: improving     Remains stable overnight, no neurologic changes. Safety measures in place for fall  prevention. Pending TCU discharge.       
Goal Outcome Evaluation:    Plan of Care Reviewed With: patient     Overall Patient Progress: no change    Patient alert to self and place. Slow slurred speech with word finding difficulty.  Left eye droop. Ataxic in bilateral upper extremities.  Denies pain.  Up with assist of 2, GB/walker, very unsteady on feet.  Sitter at bedside, took a few bites of dinner, refused to drink fluids. Glucose 177, 166. Urine output 100 ml dark red urine with sediment, bladder scanned for 9, irrigated with 30 ml normal saline.  MD notified- 0.9% Nacl ordered.  IV site with catheter site damage-removed.  Will need new IV.           
Goal Outcome Evaluation:    Plan of Care Reviewed With: patient     Overall Patient Progress: no change    Patient alert to self, confused to time, place.  Reorientation provided.  Up with assist of 1, GB/walker, unsteady on feet. Sitter at bedside. Ataxic upper extremities, dysarthric speech.  He had a fall on day shift, had a CT of the head, negative.  Creatinine 1.44, checked with pharmacy and no adjustment to eliquis needed.  IVF bolus and maintenance fluids started. Tolerated dinner well, not able to drink a lot of fluids. Skin intact with abrasions to right toes and shin.  Pollack with surya/clear urine output.           
Goal Outcome Evaluation:    Plan of Care Reviewed With: patient     Reason for Admission: R cerebellar stroke    Cognitive/Mentation: A/Ox 2-3, knows he is in the hospital - able to say year but not month/day.   Neuros/CMS: Intact ex dysarthric/garbled speech - generalized weakness and confusion  VS: Stable on RA  Tele: SR/BBB  GI: BS active, continent  : Adequate urine output - feliz in place for retention, failed voiding trial today  Pulmonary: LS clear - loose nonproductive cough  Pain: Denies    Drains/Lines: PIV  Skin: Bruising to RUE, scabs on the tops of R toes  Activity: Assist x2/GB/W - can be impulsive and is quite unsteady on feet  Diet: Minced and moist - sliding scale coverage, good appetite    Therapies recs: TCU  Discharge: Pending medical stability/placement    Aggression Stoplight Tool: Yellow for confusion    End of shift summary: Stable this shift with no changes.       
Goal Outcome Evaluation:    Plan of Care Reviewed With: patient     Right cerebellar stroke. Disoriented to time, situation, place. Neuros/CMS - arouses to voice, oriented to name & birthdate; cooperative, glasses helpful/possible left vision neglect/tracking fine but drowsy today - no sleep reported overnight/lights on & curtains open all shift to redirect sleep patterns/provided deck of cards for entertainment & distraction was helpful; generalized weakness & stiffness noted/rigid & poor stability when standing; following some commands/repeat instructions provided. VSS, RA - intemittent clearing of throat/congested cough/sputum frothy, slight color tinged phlegm.  Top & bottom dentures available/helpful. Moderate carb diet/set up and assisted with meal intake/meds crushed in applesauce/glucose monitoring and carb counting recorded/sliding scale insulin provided. Up with 2 assist/gait belt & walker/ambulated to bathroom for toilet & up in recliner for lunch/turned & repositioned as needed. Feliz in for retention/penile discharge purulent/md recommended changing out feliz catheter, will await new order/small soft bm today on commode. Denies pain. Pt scoring yellow on the Aggression Stop Light Tool. Awaiting bed placement TCU/LTC. 1:1 sit at bedside for safety/pulling at iv site & feliz/impulsive & not calling for help. Abrasion on back, applied mepilex. Mepilex on coccyx intact.         
Goal Outcome Evaluation:    Plan of Care Reviewed With: patient, daughter     Overall Patient Progress: improving    Outcome Evaluation: pt oriented to self, vague to place; cooperative but helpful to have 1:1 sitter due to at times restless/ trying to reach feliz even with mitts in place    Pt able to participate in Neuro Checks, except for heel to shin, needed encouragement for finger to nose.  Speech remains slurred, upper extremities ataxic. Cooperative, but restless at times.  Took meds whole with applesauce.  Urine continues to be unruly colored, irrigated once, 300mL output  Good PO intake.  Tele NSR.    
Goal Outcome Evaluation:    Plan of Care Reviewed With: patient, spouse        Patient here with CVA. Alert, oriented time 2-3. Neuro's with aphasia, dysarthria, confused. VSS. Assist of two with GB and walker. On a level 5 pureed diet with thickened liquids, good appetite. Intermittently incontinent of urine. Patient sat in chair today, spouse at bedside. Received COVID vaccination, consent received. Patient will discharge this afternoon to TCU.          
Goal Outcome Evaluation:    Plan of Care Reviewed With: patient, spouse     Overall Patient Progress: no change    Pt here with R CVA. A&Ox2, disoriented to place and situation. Neuros stable, expressive aphasia, gen weak. RA. Minced and moist diet, mod thick liquids. Takes pills whole in applesauce. Up with A2 lift. Up to chair this afternoon. Denies pain. Pt scoring green on the Aggression Stop Light Tool. Plan TCU. Discharge pending placement/insurance auth.    Pt straight cathed 1030 for bladder scan result >300 even after pt able to void 100mL in urinal. 1400 bladder scan 227.      
Goal Outcome Evaluation:    Plan of Care Reviewed With: patient, spouse     Right ischemic cerebellar stroke. Neuros/CMS - alert & arousable to voice; disoriented to time, situation, & place; following some commands; dysarthric; aphasic; slow speech/difficult to understand; repeat demonstration or commands needed; hard of hearing- no aides available; slow with finger to nose; slept well between cares. VSS, RA/course lungs/congested frequent cough.  Moderate carb diet/set up and assist provided/encouragement needed; meds crushed in applesauce best; tolerated moderate thick liquids with assist/sliding scale coverage & carb counted; blood sugars monitored. Bedrest with 2 assist/lift assisted with positioning; up to recliner with 2 assist/gait belt & walker for lunch meal. Feliz in for retention/adequate output/no bm today.  Penile discomfort overnight/tylenol provided on nights.  Decreased range of motion with left upper/pillows supporting all extremities. Pt scoring yellow on the Aggression Stop Light Tool. Awaiting bed placement in TCU/wife supportive at bedside over lunch hour. Scheduled zyprexa/no prn needed. Sitter 1:1 for safety and impulsive/pulls at iv & feliz,m getting up without calling.         
Goal Outcome Evaluation:    Pt Alert w/ aphasia. Satruratiung well >92% on RA. VSS. Intermittent cough w/ minimal white secretion. Tolerates Minced moderate carb diet & mild thick liquid. Urinary retention. Bladder scanned & intermittent catheterization. .Neuro intact except aphasia.IV SL on L arm. No edema on BLE, sensation intact w/ palpable pulses. Aphasia, impulsive doesn't use his call light to get out of the bed.Scabs on top of toes. Plan for TCU.                   
Goal Outcome Evaluation:    Pt here w/ acute ischemic stroke      Orientation/Cognitive: A&O to self and place, Neuros intact aside form garbel speech, word finding difficulty and ataxia to upper extremites  Mobility Level/Assist Equipment:AX2, GB, Walker  Fall Risk (Y/N):Y  Behavior Concerns: Pt very restless, pulling, his brief off, jumping out of the bed PRN IM zyprexa given w/ mild effect  Pain Management: Denies pain  Tele/VS/O2: VSS on RA  ABNL Lab/BG: B  Diet: Mod carb, minced and moist mod thick liquids. Takes pills with applesauce  Bowel/Bladder: Pollack in place, incontinent of stool  Skin Concerns:Scattered  Bruising, scabs to back and lower extremities  Drains/Devices: [place]  Tests/Procedures for next shift: NA  Anticipated DC date & active delays: Awaiting TCU placement                    
Goal Outcome Evaluation:    Reason for Admission: Acute ishemic CVA of right cerebellum     Code status: FC   Seizure or isolation precaution: N/A   Cognitive/Mentation: A/Ox 3, confused   Neuros/CMS: no new changes, neuro status is at baseline. see flow sheet for details. Intact ex. Slurred speech, BUE ataxic, BUE and BLE musle strength 5/5  CIWA Protocol: NA  VS: stable   Tele: SD  GI: BS active x4, passing flatus, Continent.  : pt has bladder scan of 407ml urine at 0100, pt was straight cathed at 0100 and 0530 with result of 400ml urine. Next bladder scan is due at 0930. Notify MD and Leave feliz cathter in place if his bladder scan is greater than 300ml at 0930, this will be his 3rd straight cath.    Use of feliz, external catheter, or urinal: uses urinal at bedside     Pulmonary: LS clear   Pain: No pain   Critical Labs or Electrolyte Replacement Protocol: N/A   Use of oxygen: N/A   Heparin or other drips: N/A     BG checks: q4h  Tests, MRI, CT, DEBBIE, Echo: No tests pending currently   Drains: SL   Skin: intact   Edema: No edema   Surgical site: N/A   Activity: Assist x 1 with GBW  Diet: minced and moist diet  with moderate thick  liquids. Takes pills whole with apple sauce   Tube Feeding: N/A   Discharge: pending. TCU.                     
Goal Outcome Evaluation:  Reason for Admission: R cerebellar CVA    Cognitive/Mentation: A/Ox 2. Agitated & Impulsive  Neuros/CMS: Intact ex d/o to situation & place. Slurred/garbled/dysarthric speech. BUE ataxia.   VS: Stable on RA.   Tele: NSR.  GI: BS active Incontinent.  : Feliz for retention. Minimal bloody/brown output. Irrigated x1 with 30mL with positive return.   Pulmonary: LS dim. Congested cough.  Pain: c/o headache post fall at start of shift.     Drains/Lines: PIV infusing NS @ 75mL (started d/t low UOP).  Skin: Blanchable redness sacrum/buttocks. Small abrasion on back. New R elbow skin tear-mepilex. Scabbed R toes. Boggy heels- attempt to keep heels elevated.   Activity: Assist x 2 with steady or lift.  Diet: Minced/Moist with mod thick liquids. Takes pills whole in puree.     Therapies recs: TCU  Discharge: TCU pending medical stability & need for 1:1 sitter.    Aggression Stoplight Tool: yellow    End of shift summary: See previous note regarding patient fall. 1:1 sitter at bedside and bilateral mitts in place for pulling at feliz catheter and impulsiveness.     Wife updated @ 0700 about patient fall.                     
Here for CVA. A&O x3, disoriented to situation.  Neuros intact x slurred speech, illogical, difficulty word finding. BL ataxia. VSS. Up w/ Ax2, GB/W. Given tylenol for pain. Pollack in place with red output. No clots noted. Tolerating minced/moist diet w moderately thick liquids. Takes pills crushed. Alarms on. Discharge pending placement.       
Neuro: A&O  X4, forgetful  Tele/cardiac: Afib CVR/  Non sustained Afib SVR  Respiration: on RA  Activity: A2 GBW  Pain: denies  Drips: none  Drains/tubes: none  GI/: incontinent of bladder, pureed diet, thin liquids  Aggression color: green  Isolation: none  Plan: echo today    
Neuro: AO to person and place  Tele/cardiac: NA  Respiration: On RA  Activity: A2 GBW  Pain: Denies  Drips: non PIV SL  Drains/tubes: none  Skin: scattered bruising, wounds to R. toes  GI/: incontinent og bladder and bowel feliz in place  Aggression color: green  Isolation: none  Plan: discharge to TCU pending    
Occupational Therapy Discharge Summary    Reason for therapy discharge:    Discharged to transitional care facility.    Progress towards therapy goal(s). See goals on Care Plan in [facility] electronic health record for goal details.  Goals not met.  Barriers to achieving goals:   discharge from facility.    Therapy recommendation(s):    Continued therapy is recommended.  Rationale/Recommendations:  to advance safety and independence with ADLS and mobilities prior to return home.                          
Physical Therapy Discharge Summary    Reason for therapy discharge:    Discharged to transitional care facility.    Progress towards therapy goal(s). See goals on Care Plan in [facility] electronic health record for goal details.  Goals partially met.  Barriers to achieving goals:   discharge from facility.    Therapy recommendation(s):    Continued therapy is recommended.  Rationale/Recommendations:  to progress strength, endurance, balance, and overall safety and independence with functional mobility. .        
Pt Alert w/ aphasia. Satruratiung well >92% on RA. Clear bilateral breath sound except fine crackles in lower lobes. Intermittent cough w/ minimal white secretion. Tolerates Minced moderate carb diet & mild thick liquid.      Urinary retention. Bladder scanned & intermittent cathetrization done x2. Received pt w/ blood tinged in his brief. Hypoactive BS x4. Last BM 08/08, notified MD. Received order for prn laxative. Pt refused senna prn, as per pt he had BM last Thursday 08/18.     BS monitored; no insulin coverage needed.  Neuro intact except aphasia.  IV SL on L arm.   No edema on BLE, sensation intact w/ palpable pulses.     Aphasia, impulsive doesn't use his call light.     Weak but p[place] pulses on feet  Scabs on top of toes.              
Pt admitted with R cerebellar CVA. Alert oriented to self only. Pt initially unable to state name and birthday until after interacting with RN for several minutes. VS on RA. Confused with garbled, slurred speech. UEs ataxic vs tremulous. Ahsan, generalized weakness. Denies pain.. CMS appears intact. Lung sounds diminished. +BS. Pollack patent with adequate o/p. Tolerating minced moist diet with moderately thick liquids (total feed). Pills given in apple sauce. Preprandial and HS /196. Sitter at the bedside for safety. Denies pain.                     
Pt here with CVA. A&O. Neuros intact ex slurred speech, word-finding difficulties, bilateral UE ataxia. Pollack in place with red output noted. VSS on RA. Tele SR with BBB. Minced & moist diet, moderately thick liquids. Takes pills crushed in applesauce. Up with Ax1-2. Denies pain. Pt scoring green on the Aggression Stop Light Tool. Plan for PT/OT/SLP. Discharge pending TCU placement.        
Pt here with CVA. A&Ox4, intermittent confusing, sitter at bedside. Neuros are WFD, slurred speech, BUE ataxic. VSS. Tele SR with BBB. Mined and moist diet, MOD thick liquids. Takes pills crush. Up with A1/GB. Pollack intact and patent, dark color urine, providers aware. Denies pain. Pt scoring yellow on the Aggression Stop Light Tool. Plan is for TCU at discharge. Discharge pending.   
Pt here with R cerebellar stroke. A&O x1-3 overnight. Neuros intact ex garbled speech and mild aphasia. VSS. Tele SR w/BBB and occ PACs. Mod-carb, pureed diet, thin liquids. Takes pills in applesauce. Up with A2/GB/W. Denies pain. Pt scoring green/yellow on the Aggression Stop Light Tool. Pt has periods where he is more agitated, seems to coincide with urinary retention. Required straight cath twice overnight. Pt also had 4 point restraints placed at 0200, LE restraints were removed at 0400. Plan for ARU placement. Discharge pending.    
Pt here with R cerebellar stroke. Pt is alert to self. Neuros with confusion, garbled speech, WFD, ataxia BUE, slow deliberate with heel to shin. VSS on RA. Ax2 walker/gb. Minced and moist/mod carb/moderately thick liquid, pills whole in apple sauce. Pollack in place for retention, pt continues to have purulent penile discharge, cares done. Incontinent of bowel. Mepilex on coccyx and upper back. Turn and repo. Denies pain. Sitter in place. Pt scoring green on Aggression Stop Light Tool. Discharge pending TCU placement.    
Pt here with R cerebellar stroke. Pt is alert x1-2. Neuros with confusion, garbled speech, WFD, slow deliberate with finger to nose and heel to shin. VSS on RA. Ax2 walker/gb. Minced and moist/mod carb/moderately thick liquid, pills crushed in apple sauce. Pollack in place for retention, pt continues to have purulent penile discharge. Incontinent of bowel. Mepilex on coccyx. Turn and repo. Denies pain. Sitter in place. Pt scoring green on Aggression Stop Light Tool. Discharge pending TCU placement.  
Pt here with R cerebellum CVA. A&Ox2, disoriented to time and situation. Neuros are garbled speech, mild aphasia, UE ataxia. VSS. Mined and moist diet, MOD thick liquids. Takes pills crush. Up with A1-2/GB, Incontinent of B&B.  Denies pain. Pt scoring yellow on the Aggression Stop Light Tool. Discharge pending for TCU.     
Pt here with ischemic stroke of right cerebellum. Disoriented to time, place, situation. Orientation changed throughout shift, AxOx4 at start of shift, provider aware. Neuros intact, forgetful. VSS.  IDDSI level minced/moist diet with moderately thick liquids. Takes pills whole. Up with Ax2, GB, walker. Incontinent/continent of bowel and bladder, uses bedside urinal. Denies pain. Pt scoring green on the Aggression Stop Light Tool. Plan discharge to TCU, insurance pending.                       
Pt here with right cerebellar CVA. Alert and oriented, occasionally forgetful. Neuros intact. VSS. Tele sinus rhythm with BBB, occasional PVCs. IDDSI level pureed combo diet with thin liquids. Takes pills whole with applesauce. Up with Ax2, GB, walker. Continent of bowel and incontinent/continent of bladder. Denies pain. Pt scoring green on the Aggression Stop Light Tool. Plan to discharge to ARU.                      
Pt is alert to self and occasionally place, Ax2 w/ lift. Pollack in place- irrigated on day shift. Soft BP's. Tele is NSR but can be rosalee at times. Pt was lethargic this four hours d/t activity throughout the day w/ PT and nursing staff. Takes pills whole in applesauce.   
Pt is disoriented to situation, place, and time. Ax2 w/ sherley steady, minced and moist diet w/ moderately thick liquids. Takes pills whole w/ thickened water. Incontinent at times. Slightly impulsive when sitting in the chair. BG before meals and at bedtime, on carb count. Waiting for placement.   
Reason for Admission: R acute ischemic stroke    Cognitive/Mentation: A/Ox 2  Neuros/CMS: Dysarthric, slurred, WFD, BUE ataxia. Confused.   VS: stable on RA.   Tele: NA.  GI: BS audible, passing flatus. Not Continent.  : Pollack.   Pulmonary: LS coarse, crackles, congested cough.  Pain: none.     Drains/Lines: PIV SL  Skin: WDL ex for abrasion and bruising on feet  Activity: Assist x 2 with lift.  Diet: minced and moist with moderately liquids. Takes pills crushed.     Therapies recs: TCU  Discharge: pending placement    Aggression Stoplight Tool: yellow                            
Reason for Admission: R cerebellar CVA    Cognitive/Mentation: A/Ox 2-3  Neuros/CMS: Intact ex garbled/rambling speech, delirium overnight  VS: stable on RA..  GI: BS active, + flatus. inContinent.  : Pollack for retention.  Pulmonary: LS coarse crackled.  Pain: denies.     Drains/Lines: PIV L  Skin: intact ex mepilex on coccyx  Activity: Assist x 2 with GBW.  Diet: minced and moist/mod carb with mild thick liquids. Takes pills crushed in applesauce.     Therapies recs: TCU  Discharge: pending clearance    End of shift summary: Pt restless overnight, zyprexa and seroquel given. Long arm in place overnight.      
Reason for Admission: R cerebellar CVA    Cognitive/Mentation: A/Ox 3, very forgetful  Neuros/CMS: Intact ex expressive aphasia, BUE ataxia, generalized weakness, and slurred garbled speech  VS: Stable.  GI: BS +, + flatus, last BM today. Incontinent.  : Retention, intermittent straight cath, patient able to urinate at times.  Pulmonary: LS clear.  Pain: Denies.     Drains: None  Skin: scattered bruising  Activity: Assist x 2 with GBW or Gloria Steady.  Diet: Minced and moist with moderately thickened liquids. Takes pills whole in pudding.     Aggression Stoplight Score: Yellow for confusion  Therapies recs: TCU vs. Memory care  Discharge: Pending    End of shift summary: Patient more cooperative and oriented this shift. Remains impulsive, bed alarm on.       
Reason for Admission: R cerebellar CVA.    Cognitive/Mentation: A/O to self and time this shift. Waxes/wanes.  Neuros/CMS: Intact ex confusion, slurred/garbled speech, ataxia, L eye ptosis.  VS: stable on RA.  GI: BS active, passing flatus. No BM overnight  : Feliz intact with adequate output. Frequent feliz cares d/t discharge.  Pulmonary: LS diminished.  Pain: denies.     Drains/Lines: PIV x1  Skin: Redness on back & sacrum- mepilex. R toe dressing intact-no drainage.  Activity: Assist x 2 with W GB.  Diet: Minced/Moist with mod thick liquids. Takes pills whole in applesauce.     Discharge: pending- still requiring sitter at bedside for patient safety d/t impulsiveness and pulling at tubes.    Aggression Stoplight Tool: yellow    End of shift summary: Pt stable throughout shift. No concerns overnight.              
Reason for Admission: R cerebellar stroke    Cognitive/Mentation: A/Ox to self and place  Neuros/CMS: Intact ex L neglect, word-finding difficulty, rambling speech, arouses to voice  VS: stable. .  GI: BS active x4, passing flatus, last BM 8/11/22. Continent.  : feliz patent and draining surya urine.  Pulmonary: LS  Crackles, congested cough.  Pain: denies.     Drains/Lines: Feliz, patent  Activity: Assist x 2 with GB/W.  Diet: Minced and moist mod carb with mildly liquids. Takes pills crushed in applesauce.     Therapies recs: TCU  Discharge: pending placement and no longer needing a sitter    Aggression Stoplight Tool: yellow d/t confusion       
Reason for Admission: R cerebellar stroke   Cognitive/Mentation: A/Ox 1-3, forgetful   Neuros/CMS: Stable with generalized weakness, garbled speech and mild aphasia.  VS: VSS on RA   Tele: SR  GI: BS audible, + flatus, no BM this shift. Incontinent/Continent.  : Pollack in place for retention, good output. Urine has consistent medium tint of blood.  Pulmonary: LS clear  Pain: Denies   Drains/Lines: PIV SL   Skin: Intact with scattered bruising.   Activity: Assist x 2 with GB/W.  Diet: Mod carb/Pureed diet with mildly thick liquids. Takes pills whole.   Therapies recs: ARU  Discharge: Pending  Aggression Stoplight Tool: Yellow for confusion  End of shift summary: No changes this shift. Pt calm throughout night. Sitter at bedside throughout night.  
Reason for Admission: R cerebellar stroke   Cognitive/Mentation: A/Ox 2, disoriented to place and situation.   Neuros/CMS: Stable with generalized weakness and expressive aphasia. Strength 4/5 uppers/lowers. Speech is very garbled, dysarthric, slurred with WFD. BUE ataxic. Confused/forgetful.    VS: VSS on RA  Tele: N/A  GI: BS audible, + flatus, no BM this shift. Incontinent.  : Voiding using bedside urinal. Urine is consistently has a pink tint with sediment. Bladder scanned for 180 mL at 0224.   Pulmonary: LS diminished  Pain: Denies   Drains/Lines: None  Skin: Intact, with scattered bruising/scabs.   Activity: Assist x 2 with GB/W  Diet: Minced and moist diet with moderately thick liquids. Takes pills whole in applesauce.  Therapies recs: TCU  Discharge: Pending placement/insurance auth  Aggression Stoplight Tool: Green  End of shift summary: No changes this shift.    
Reason for Admission: R cerebellar stroke  Cognitive/Mentation: A/Ox 1-2  Neuros/CMS: Stable with garbled speech, mild aphasia. BUE ataxia. Generalized weakness.   VS: VSS on RA   Tele: N/A  GI: BS audible, + flatus, no BM this shift. Continent/Incontinent.  : Voiding adequately. Continent/Incontinent.  Pulmonary: LS clear  Pain: Denies  Drains/Lines: PIV SL  Skin: Intact, scattered bruising. Mepilex on sacrum/coccyx.   Activity: Assist x 2 with GB/W.  Diet: Minced and moist diet with moderately thick liquids. Takes pills whole with applesauce.   Therapies recs: TCU  Discharge: Pending  Aggression Stoplight Tool: Yellow for confusion  End of shift summary: No changes this shift.  
Reason for Admission: R cerebral CVA     Cognitive/Mentation: A/Ox 3. D/o time  Neuros/CMS: Intact ex expressive aphasia   VS: stable.   GI: BS active, + flatus. Continent.  : DTV. feliz removed.  Pulmonary: LS clear.  Pain: denies.      Drains/Lines: PIV  Skin: intact ex scattered bruising  Activity: Assist x 2 with lift.  Diet: Minced and moist with moderately thick liquids. Takes pills whole in applesauce.      Therapies recs: memory care placement  Discharge: 8/23?     Aggression Stoplight Tool: greeen     End of shift summary: Patient stable throughout shift.  Impulsive at times. Straight cathed x1.     
Reason for Admission: R cerebral CVA     Cognitive/Mentation: A/Ox 3. D/o time  Neuros/CMS: Intact ex expressive aphasia   VS: stable.   GI: BS active, + flatus. Continent.  : DTV. feliz removed.  Pulmonary: LS clear.  Pain: denies.      Drains/Lines: PIV  Skin: intact ex scattered bruising  Activity: Assist x 2 with lift.  Diet: Minced and moist with moderately thick liquids. Takes pills whole in applesauce.      Therapies recs: memory care placement  Discharge: 8/23?     Aggression Stoplight Tool: greeen     End of shift summary: Patient stable throughout shift. Sitter removed at 0700. Feliz removed at 1330. DTV. Bladder scanned patient at 1830 for 187 mL.     
Reason for Admission: Right ischemic stroke    Cognitive/Mentation: A/Ox 1 self  Neuros/CMS: Intact ex confused, garbled speech, inconsistent with demands, generalized weakness.  VS: stable.   GI: BS active, Incontinent.  : Incontinent.   Pulmonary: LS clear.  Pain: denies.   Skin: scattered bruising   Activity: Assist x 2 with GB and walker.  Diet: minced and moist with mod thick liquids. Takes pills whole in applesauce.     Therapies recs: TCU  Discharge: pending    Aggression Stoplight Tool: green    End of shift summary: Patient impulsive with wanting to get out of bed.                        
Shift Summary [id number]    Remains restless, but redirectable this evening. Disoriented to situation consistently, and occasionally disoriented to place. Continuing to pull at feliz. Bloody discharge noted around catheter site. Urine output adequate. Incontinent of stool with two bowel movements occurring this shift. No complaints of pain. Assist of 2 with walker and gait belt for transfers. Attendant in room for safety. Resting between cares.     
FAWN/[place]

## 2024-07-24 ENCOUNTER — HOSPITAL ENCOUNTER (OUTPATIENT)
Dept: CARDIOLOGY | Facility: CLINIC | Age: 89
Discharge: HOME | End: 2024-07-24
Attending: INTERNAL MEDICINE | Admitting: INTERNAL MEDICINE
Payer: COMMERCIAL

## 2024-07-24 DIAGNOSIS — I42.9 CARDIOMYOPATHY, UNSPECIFIED TYPE (H): ICD-10-CM

## 2024-07-24 LAB — LVEF ECHO: NORMAL

## 2024-07-24 PROCEDURE — 93306 TTE W/DOPPLER COMPLETE: CPT | Mod: 26 | Performed by: INTERNAL MEDICINE

## 2024-07-24 PROCEDURE — 93306 TTE W/DOPPLER COMPLETE: CPT

## 2025-04-22 NOTE — CONSULTS
Care Management Initial Consult    General Information  Assessment completed with: Patient, Spouse or significant other, Family, Spouse  Type of CM/SW Visit: Initial Assessment    Primary Care Provider verified and updated as needed: Yes   Readmission within the last 30 days:        Reason for Consult: discharge planning  Advance Care Planning: Advance Care Planning Reviewed: other (see comments) (no acp documents on file)          Communication Assessment  Patient's communication style: spoken language (English or Bilingual)             Cognitive  Cognitive/Neuro/Behavioral: .WDL except  Level of Consciousness: alert  Arousal Level: opens eyes spontaneously  Orientation: disoriented to, situation, time  Mood/Behavior: calm, cooperative  Best Language: 1 - Mild to moderate  Speech: slurred, word-finding difficulty    Living Environment:   People in home: spouse  [place]  Current living Arrangements: apartment      Able to return to prior arrangements: other (see comments) (after therapy)       Family/Social Support:  Care provided by: self  Provides care for: no one  Marital Status:   Wife  [place]       Description of Support System: Supportive, Involved    Support Assessment: Adequate family and caregiver support, Adequate social supports    Current Resources:   Patient receiving home care services:       Community Resources:    Equipment currently used at home:  (Walker - doesn't use)  Supplies currently used at home:      Employment/Financial:  Employment Status: retired     Employment/ Comments: [place] - [facility]  Financial Concerns: No concerns identified   Referral to Financial Worker: No       Lifestyle & Psychosocial Needs:  Social Determinants of Health     Tobacco Use: Medium Risk     Smoking Tobacco Use: Former Smoker     Smokeless Tobacco Use: Unknown   Alcohol Use: Not on file   Financial Resource Strain: Not on file   Food Insecurity: Not on file   Transportation Needs: Not on file  "  Physical Activity: Not on file   Stress: Not on file   Social Connections: Not on file   Intimate Partner Violence: Not on file   Depression: Not on file   Housing Stability: Not on file       Functional Status:  Prior to admission patient needed assistance:              Mental Health Status:          Chemical Dependency Status:                Values/Beliefs:  Spiritual, Cultural Beliefs, Hinduism Practices, Values that affect care: no               Additional Information:  Per care management/social work consult for discharge planning. Patient admitted on 8/1/22 with dysarthria, aphasia and ataxia and noted to have an acute ischemic stroke of the RIGHT cerebellum. Date of discharge is yet to be determined. Writer reviewed chart and met with patient, spouse [place] and sister-in-law present at bedside. Patient lives in a [address] apartment, and prior to admission was independent at baseline with all functional mobility and ADLs. Writer reviewed therapy recommendations for ARU at discharge and educated on difference of both settings. Spouse noted she called [employer] to discuss coverage at facilities and reported some TCU accepting facilities. Writer educated on recommendation for ARU and noted [facility] does not accept [employer]. Spouse notes [facility] - writer noted a referral can be sent there - spouse stated she does not like to drive on highways but is in agreement for referral to be sent there as she is aware there are limited [place] in [place] and even more limitations due to [employer] insurance. Patient is not medically ready for discharge at this time, but writer will [facility] out to [facility] to discuss service connections and bed availability/appropriateness. [facility] with first name \"Mary\" but could not remember last name. Spouse requested to speak with neurology regarding prognosis - writer requested neuro to follow up with spouse.     Will continue to " follow.    LEANDRA Jarrett, [facility]    [facility]       Detail Level: Detailed Add 29436 Cpt? (Important Note: In 2017 The Use Of 24524 Is Being Tracked By Cms To Determine Future Global Period Reimbursement For Global Periods): no